# Patient Record
Sex: MALE | Race: WHITE | NOT HISPANIC OR LATINO | Employment: FULL TIME | URBAN - NONMETROPOLITAN AREA
[De-identification: names, ages, dates, MRNs, and addresses within clinical notes are randomized per-mention and may not be internally consistent; named-entity substitution may affect disease eponyms.]

---
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
## 2018-05-28 ENCOUNTER — HOSPITAL ENCOUNTER (EMERGENCY)
Facility: HOSPITAL | Age: 18
Discharge: HOME | End: 2018-05-28
Attending: FAMILY MEDICINE | Admitting: FAMILY MEDICINE
Payer: MEDICAID

## 2018-05-28 VITALS
RESPIRATION RATE: 16 BRPM | OXYGEN SATURATION: 96 % | DIASTOLIC BLOOD PRESSURE: 81 MMHG | TEMPERATURE: 98.4 F | SYSTOLIC BLOOD PRESSURE: 122 MMHG

## 2018-05-28 DIAGNOSIS — R10.9 FLANK PAIN: ICD-10-CM

## 2018-05-28 LAB
ALBUMIN UR-MCNC: NEGATIVE MG/DL
ANION GAP SERPL CALCULATED.3IONS-SCNC: 7 MMOL/L (ref 3–14)
APPEARANCE UR: CLEAR
BACTERIA #/AREA URNS HPF: ABNORMAL /HPF
BASOPHILS # BLD AUTO: 0.1 10E9/L (ref 0–0.2)
BASOPHILS NFR BLD AUTO: 0.8 %
BILIRUB UR QL STRIP: NEGATIVE
BUN SERPL-MCNC: 11 MG/DL (ref 7–30)
C TRACH DNA SPEC QL PROBE+SIG AMP: NOT DETECTED
CALCIUM SERPL-MCNC: 9.3 MG/DL (ref 8.5–10.1)
CHLORIDE SERPL-SCNC: 106 MMOL/L (ref 98–110)
CO2 SERPL-SCNC: 26 MMOL/L (ref 20–32)
COLOR UR AUTO: YELLOW
CREAT SERPL-MCNC: 1.04 MG/DL (ref 0.5–1)
CRP SERPL-MCNC: <2.9 MG/L (ref 0–8)
DIFFERENTIAL METHOD BLD: ABNORMAL
EOSINOPHIL # BLD AUTO: 0.2 10E9/L (ref 0–0.7)
EOSINOPHIL NFR BLD AUTO: 1.4 %
ERYTHROCYTE [DISTWIDTH] IN BLOOD BY AUTOMATED COUNT: 11.9 % (ref 10–15)
GFR SERPL CREATININE-BSD FRML MDRD: >90 ML/MIN/1.7M2
GLUCOSE SERPL-MCNC: 71 MG/DL (ref 70–99)
GLUCOSE UR STRIP-MCNC: NEGATIVE MG/DL
HCT VFR BLD AUTO: 43.1 % (ref 40–53)
HGB BLD-MCNC: 15.3 G/DL (ref 13.3–17.7)
HGB UR QL STRIP: NEGATIVE
IMM GRANULOCYTES # BLD: 0.1 10E9/L (ref 0–0.4)
IMM GRANULOCYTES NFR BLD: 0.4 %
KETONES UR STRIP-MCNC: NEGATIVE MG/DL
LEUKOCYTE ESTERASE UR QL STRIP: NEGATIVE
LYMPHOCYTES # BLD AUTO: 2.7 10E9/L (ref 0.8–5.3)
LYMPHOCYTES NFR BLD AUTO: 23.1 %
MCH RBC QN AUTO: 30.4 PG (ref 26.5–33)
MCHC RBC AUTO-ENTMCNC: 35.5 G/DL (ref 31.5–36.5)
MCV RBC AUTO: 86 FL (ref 78–100)
MONOCYTES # BLD AUTO: 0.7 10E9/L (ref 0–1.3)
MONOCYTES NFR BLD AUTO: 6.2 %
MUCOUS THREADS #/AREA URNS LPF: PRESENT /LPF
N GONORRHOEA DNA SPEC QL PROBE+SIG AMP: NOT DETECTED
NEUTROPHILS # BLD AUTO: 8.1 10E9/L (ref 1.6–8.3)
NEUTROPHILS NFR BLD AUTO: 68.1 %
NITRATE UR QL: NEGATIVE
NRBC # BLD AUTO: 0 10*3/UL
NRBC BLD AUTO-RTO: 0 /100
PH UR STRIP: 6 PH (ref 4.7–8)
PLATELET # BLD AUTO: 271 10E9/L (ref 150–450)
POTASSIUM SERPL-SCNC: 3.8 MMOL/L (ref 3.4–5.3)
RBC # BLD AUTO: 5.04 10E12/L (ref 4.4–5.9)
RBC #/AREA URNS AUTO: 1 /HPF (ref 0–2)
SODIUM SERPL-SCNC: 139 MMOL/L (ref 133–144)
SOURCE: ABNORMAL
SP GR UR STRIP: 1.02 (ref 1–1.03)
SPECIMEN SOURCE: NORMAL
UROBILINOGEN UR STRIP-MCNC: 2 MG/DL (ref 0–2)
WBC # BLD AUTO: 11.8 10E9/L (ref 4–11)
WBC #/AREA URNS AUTO: 2 /HPF (ref 0–5)

## 2018-05-28 PROCEDURE — 25000132 ZZH RX MED GY IP 250 OP 250 PS 637: Performed by: FAMILY MEDICINE

## 2018-05-28 PROCEDURE — 80048 BASIC METABOLIC PNL TOTAL CA: CPT | Performed by: FAMILY MEDICINE

## 2018-05-28 PROCEDURE — 86140 C-REACTIVE PROTEIN: CPT | Performed by: FAMILY MEDICINE

## 2018-05-28 PROCEDURE — 99284 EMERGENCY DEPT VISIT MOD MDM: CPT | Mod: Z6 | Performed by: FAMILY MEDICINE

## 2018-05-28 PROCEDURE — 81001 URINALYSIS AUTO W/SCOPE: CPT | Performed by: FAMILY MEDICINE

## 2018-05-28 PROCEDURE — 36415 COLL VENOUS BLD VENIPUNCTURE: CPT | Performed by: FAMILY MEDICINE

## 2018-05-28 PROCEDURE — 87591 N.GONORRHOEAE DNA AMP PROB: CPT | Performed by: FAMILY MEDICINE

## 2018-05-28 PROCEDURE — 85025 COMPLETE CBC W/AUTO DIFF WBC: CPT | Performed by: FAMILY MEDICINE

## 2018-05-28 PROCEDURE — 99284 EMERGENCY DEPT VISIT MOD MDM: CPT

## 2018-05-28 PROCEDURE — 25000128 H RX IP 250 OP 636: Performed by: FAMILY MEDICINE

## 2018-05-28 PROCEDURE — 87491 CHLMYD TRACH DNA AMP PROBE: CPT | Performed by: FAMILY MEDICINE

## 2018-05-28 RX ORDER — ACETAMINOPHEN 325 MG/1
975 TABLET ORAL ONCE
Status: COMPLETED | OUTPATIENT
Start: 2018-05-28 | End: 2018-05-28

## 2018-05-28 RX ADMIN — SODIUM CHLORIDE 1000 ML: 9 INJECTION, SOLUTION INTRAVENOUS at 21:44

## 2018-05-28 RX ADMIN — ACETAMINOPHEN 975 MG: 325 TABLET ORAL at 23:31

## 2018-05-28 ASSESSMENT — ENCOUNTER SYMPTOMS
FLANK PAIN: 1
DIFFICULTY URINATING: 0
VOMITING: 0
HEMATURIA: 0
DIARRHEA: 0
FREQUENCY: 0
DYSURIA: 0
CARDIOVASCULAR NEGATIVE: 1
PSYCHIATRIC NEGATIVE: 1
NAUSEA: 0
CONSTIPATION: 0
CONSTITUTIONAL NEGATIVE: 1
ABDOMINAL PAIN: 1
BACK PAIN: 1

## 2018-05-28 NOTE — ED AVS SNAPSHOT
[facility]    [address]    [place] [address]    Phone:  [phone]                                       Nnamdi Weeks   MRN: [id number]    Department:  [facility]   Date of Visit:  5/28/2018           Patient Information     Date Of Birth          2000        Your diagnoses for this visit were:     Flank pain        You were seen by Amanda Araiza MD.      Follow-up Information     Call Trang Merida    Specialty:  Family Practice    Contact information:    [facility]  [address] [facility] [address]  [phone]          Discharge Instructions         * Abdominal Pain,Uncertain Cause [Male]  Based on your visit today, the exact cause of your abdominal (stomach) pain is not clear. Your exam and tests do not indicate a dangerous cause at this time. However, the signs of a serious problem may take more time to appear. Although your exam was reassuring today, sometimes early in the course of many conditions, exam and lab tests can appear normal. Therefore, it is important for you to watch for any new symptoms or worsening of your condition.  Causes:  It may not be obvious what caused your symptoms. Pay attention to things that do seem to make your symptoms worse or better and discuss this with your doctor when you follow up.  Diagnosis:  The evaluation of abdominal pain in the emergency department may only require an exam by the doctor or it may include blood, urine or imaging studies, depending on many factors. Sometimes exams and tests can identify a cause but in many cases, a clear cause is not found. Further testing at follow up visits may help to suggest a clear diagnosis.  Home Care:    Rest as much as possible until your next exam.    Try to avoid any medications (unless otherwise directed by your doctor), foods, activities, or other factors that you may have contributed to your symptoms.    Try to eat foods that you know  that you have tolerated well in the past. Certain diets may be recommended for some conditions that cause abdominal pain. However, since the cause of your symptoms may not be clear, discuss your diet more with your primary care provider or specialist for further recommendations.     Eating several small meals per day as opposed to 2 or 3 larger meals may help.    Monitor closely for anything that may make your symptoms worse or better. Pay close attention to symptoms below that may indicate worsening of your condition.  Follow Up And Precautions:  See your doctor or this facility as instructed (or sooner, if your symptoms are not improving). In some cases, you may need more testing.  Contact Your Doctor Or Seek Medical Attention  if any of the following occur:    Pain is becoming worse    You are unable to take your medications because of too much vomiting    Swelling of the abdomen    Fever of 101 F (38.3 C) or higher, or as directed by your health care provider    Blood in vomit or bowel movements (dark red or black color)    Jaundice (yellow color of eyes and skin)    New onset of weakness, dizziness or fainting    New onset of chest, arm, back, neck or jaw pain    2091-4846 [employer]. [address] [place] [address]. All rights reserved. This information is not intended as a substitute for professional medical care. Always follow your healthcare professional's instructions.  This information has been modified by your health care provider with permission from the publisher.          Flank Pain, Uncertain Cause  The flank is the area between your upper abdomen and your back. Pain there is often caused by a problem with your kidneys. It might be a kidney infection or a kidney stone. Other causes of flank pain include spinal arthritis, a pinched nerve from a back injury, or a back muscle strain or spasm.  The cause of your flank pain is not certain. You may need other tests.  Home  care  Follow these tips when caring for yourself at home:    You may use acetaminophen or ibuprofen to control pain, unless your health care provider prescribed another medicine. If you have chronic liver or kidney disease, talk with your provider before taking these medicines. Also talk with your provider first if you ve ever had a stomach ulcer or GI bleeding.    If the pain is coming from your muscles, you may get relief with ice or heat. During the first 2 days after the injury, put an ice pack on the painful area for 20 minutes every 2 to 4 hours. This will reduce swelling and pain. A hot shower, hot bath, or heating pad works well for a muscle spasm. You can start with ice, then switch to heat after 2 days. You might find that alternating ice and heat works well. Use the method that feels the best to you.  Follow-up care  Follow up with your healthcare provider if your symptoms don t get better over the next few days.  When to seek medical advice  Call your healthcare provider right away if any of these happen:    Repeated vomiting    Fever of 100.4 F (38 C) or higher, or as directed by your health care provider    Flank pain that gets worse    Pain that spreads to the front of your belly (abdomen)    Dizziness, weakness, or fainting    Blood in your urine    Burning feeling when you urinate or the need to urinate often    Pain in one of your legs that gets worse    Numbness or weakness in a leg  Date Last Reviewed: 10/1/2016    0314-4149 [employer]. [address] [place] [address], [place] [address]. All rights reserved. This information is not intended as a substitute for professional medical care. Always follow your healthcare professional's instructions.    I suspect your pain is musculoskeletal . Recommend ice, ibuprofen , tylenol . Recommend schedule a follow up appointment with your primary care provider for further recommendations          Review of your medicines      Notice     You have not  "been prescribed any medications.            Procedures and tests performed during your visit     Basic metabolic panel    CBC with platelets differential    CRP inflammation    GC/Chlamydia by PCR - [facility]    Peripheral IV catheter    UA with Microscopic reflex to Culture      Orders Needing Specimen Collection     None      Pending Results     Date and Time Order Name Status Description    2018 2131 GC/Chlamydia by PCR - [facility] In process             Pending Culture Results     Date and Time Order Name Status Description    2018 2131 GC/Chlamydia by PCR - [facility] In process             Thank you for choosing [facility]       Thank you for choosing [facility] for your care. Our goal is always to provide you with excellent care. Hearing back from our patients is one way we can continue to improve our services. Please take a few minutes to complete the written survey that you may receive in the mail after you visit with us. Thank you!        [employer] Information     [facility] lets you send messages to your doctor, view your test results, renew your prescriptions, schedule appointments and more. To sign up, go to [email or web address] . Click on \"Log in\" on the left side of the screen, which will take you to the Welcome page. Then click on \"Sign up Now\" on the right side of the page.     You will be asked to enter the access code listed below, as well as some personal information. Please follow the directions to create your username and password.     Your access code is: [email or web address]  Expires: 2018 11:23 PM     Your access code will  in 90 days. If you need help or a new code, please call your [facility] clinic or [phone].        Care EveryWhere ID     This is your Care EveryWhere ID. This could be used by other organizations to access your [facility] medical records  [id number]        Equal Access to Services     ROSS BUNCH AH: marti Rae qaybta kaalmada adeegyada, " virgilio bustamante'aan ah. So [facility] [phone].    ATENCIÓN: Si habla español, tiene a priest disposición servicios gratuitos de asistencia lingüística. Llame al [phone].    We comply with applicable federal civil rights laws and [place] laws. We do not discriminate on the basis of race, color, national origin, age, disability, sex, sexual orientation, or gender identity.            After Visit Summary       This is your record. Keep this with you and show to your community pharmacist(s) and doctor(s) at your next visit.

## 2018-05-28 NOTE — ED AVS SNAPSHOT
[facility]    [address] [address]    Phone:  [phone]                                       Nnamdi Weeks   MRN: [id number]    Department:  [facility]   Date of Visit:  5/28/2018           After Visit Summary Signature Page     I have received my discharge instructions, and my questions have been answered. I have discussed any challenges I see with this plan with the nurse or doctor.    ..........................................................................................................................................  Patient/Patient Representative Signature      ..........................................................................................................................................  Patient Representative Print Name and Relationship to Patient    ..................................................               ................................................  Date                                            Time    ..........................................................................................................................................  Reviewed by Signature/Title    ...................................................              ..............................................  Date                                                            Time

## 2018-05-29 NOTE — ED NOTES
Patient verbalizes understanding of discharge instructions. Afebrile. Rates pain 5/10. PO tylenol given. Denies nausea.

## 2018-05-29 NOTE — DISCHARGE INSTRUCTIONS
* Abdominal Pain,Uncertain Cause [Male]  Based on your visit today, the exact cause of your abdominal (stomach) pain is not clear. Your exam and tests do not indicate a dangerous cause at this time. However, the signs of a serious problem may take more time to appear. Although your exam was reassuring today, sometimes early in the course of many conditions, exam and lab tests can appear normal. Therefore, it is important for you to watch for any new symptoms or worsening of your condition.  Causes:  It may not be obvious what caused your symptoms. Pay attention to things that do seem to make your symptoms worse or better and discuss this with your doctor when you follow up.  Diagnosis:  The evaluation of abdominal pain in the emergency department may only require an exam by the doctor or it may include blood, urine or imaging studies, depending on many factors. Sometimes exams and tests can identify a cause but in many cases, a clear cause is not found. Further testing at follow up visits may help to suggest a clear diagnosis.  Home Care:    Rest as much as possible until your next exam.    Try to avoid any medications (unless otherwise directed by your doctor), foods, activities, or other factors that you may have contributed to your symptoms.    Try to eat foods that you know that you have tolerated well in the past. Certain diets may be recommended for some conditions that cause abdominal pain. However, since the cause of your symptoms may not be clear, discuss your diet more with your primary care provider or specialist for further recommendations.     Eating several small meals per day as opposed to 2 or 3 larger meals may help.    Monitor closely for anything that may make your symptoms worse or better. Pay close attention to symptoms below that may indicate worsening of your condition.  Follow Up And Precautions:  See your doctor or this facility as instructed (or sooner, if your symptoms are not  improving). In some cases, you may need more testing.  Contact Your Doctor Or Seek Medical Attention  if any of the following occur:    Pain is becoming worse    You are unable to take your medications because of too much vomiting    Swelling of the abdomen    Fever of 101 F (38.3 C) or higher, or as directed by your health care provider    Blood in vomit or bowel movements (dark red or black color)    Jaundice (yellow color of eyes and skin)    New onset of weakness, dizziness or fainting    New onset of chest, arm, back, neck or jaw pain    7974-7966 [employer]. [address] [place] [address]. All rights reserved. This information is not intended as a substitute for professional medical care. Always follow your healthcare professional's instructions.  This information has been modified by your health care provider with permission from the publisher.          Flank Pain, Uncertain Cause  The flank is the area between your upper abdomen and your back. Pain there is often caused by a problem with your kidneys. It might be a kidney infection or a kidney stone. Other causes of flank pain include spinal arthritis, a pinched nerve from a back injury, or a back muscle strain or spasm.  The cause of your flank pain is not certain. You may need other tests.  Home care  Follow these tips when caring for yourself at home:    You may use acetaminophen or ibuprofen to control pain, unless your health care provider prescribed another medicine. If you have chronic liver or kidney disease, talk with your provider before taking these medicines. Also talk with your provider first if you ve ever had a stomach ulcer or GI bleeding.    If the pain is coming from your muscles, you may get relief with ice or heat. During the first 2 days after the injury, put an ice pack on the painful area for 20 minutes every 2 to 4 hours. This will reduce swelling and pain. A hot shower, hot bath, or heating pad works well  for a muscle spasm. You can start with ice, then switch to heat after 2 days. You might find that alternating ice and heat works well. Use the method that feels the best to you.  Follow-up care  Follow up with your healthcare provider if your symptoms don t get better over the next few days.  When to seek medical advice  Call your healthcare provider right away if any of these happen:    Repeated vomiting    Fever of 100.4 F (38 C) or higher, or as directed by your health care provider    Flank pain that gets worse    Pain that spreads to the front of your belly (abdomen)    Dizziness, weakness, or fainting    Blood in your urine    Burning feeling when you urinate or the need to urinate often    Pain in one of your legs that gets worse    Numbness or weakness in a leg  Date Last Reviewed: 10/1/2016    2490-2261 [employer]. [address] [place] [address]. All rights reserved. This information is not intended as a substitute for professional medical care. Always follow your healthcare professional's instructions.    I suspect your pain is musculoskeletal . Recommend ice, ibuprofen , tylenol . Recommend schedule a follow up appointment with your primary care provider for further recommendations

## 2018-05-29 NOTE — ED NOTES
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 His PCP is in [place], states \"This is a much better ER than [place]'s\" Providing a urine sample. Very lighted hearted and laughing, in no acute distress.  "

## 2018-05-29 NOTE — ED PROVIDER NOTES
History     Chief Complaint   Patient presents with     Flank Pain     rt flank pain x 2 days     HPI  Nnamdi Weeks is a 17 year old male who presents for right flank pain . STates pain in right lower back . States that pain started about 2 days ago . Has had similar episodes previously.  Stabbing , sharp pain Increased pain with breathing . NO urinary changes . NO difficulty urinating . NO blood in urine . NO recent heavy lifting or change in activity . Slight cough but states has constant congestion from allergies. NO fever . NO chills . NO systemic symptoms No history of constipation      Problem List:    Patient Active Problem List    Diagnosis Date Noted     Bipolar I disorder (H) 04/07/2016     Priority: Medium     Attention deficit hyperactivity disorder (ADHD), predominantly inattentive type 04/07/2016     Priority: Medium        Past Medical History:    Past Medical History:   Diagnosis Date     Abdominal pain, unspecified site 11/14/2008     ADHD (attention deficit hyperactivity disorder) 12/13/2012     Amphetamine user      Anxiety disorder 03/19/2015     Asthma,unspecified type, unspecified 05/25/2005     Bronchitis, not specified as acute or chronic 03/24/2005     Chemical dependency (H) 10/20/2014     Conduct disorder 10/20/2014     Deviated nasal septum      Diarrhea 09/27/2005     Disruptive behavior disorder 03/19/2015     Hearing deficit, bilateral 09/04/2014     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/13/2012     IBS (irritable bowel syndrome)      Intermittent explosive disorder      Lactose intolerance 12/13/2012     OCD (obsessive compulsive disorder) 03/09/2015     ODD (oppositional defiant disorder) 03/09/2015     Orthopedic aftercare NEC 10/08/2004     Polyphagia 12/07/2007     PTSD (post-traumatic stress disorder) 03/19/2015     Routine infant or child health check 09/12/2007     Sensorineural hearing loss 11/03/2004       Past Surgical History:    Past Surgical History:   Procedure Laterality Date      ADENOIDECTOMY       TONSILLECTOMY         Family History:    Family History   Problem Relation Age of Onset     Allergies Sister      Allergies Mother      Asthma Sister      HEART DISEASE Maternal Grandmother      disease     Schizophrenia Brother        Social History:  Marital Status:  Single [1]  Social History   Substance Use Topics     Smoking status: Current Every Day Smoker     Packs/day: 1.00     Smokeless tobacco: Former User      Comment: no passive exposure     Alcohol use No        Medications:      No current outpatient prescriptions on file.      Review of Systems   Constitutional: Negative.    HENT: Negative.    Cardiovascular: Negative.    Gastrointestinal: Positive for abdominal pain. Negative for constipation, diarrhea, nausea and vomiting.   Genitourinary: Positive for flank pain. Negative for decreased urine volume, difficulty urinating, dysuria, enuresis, frequency, hematuria and urgency.   Musculoskeletal: Positive for back pain.   Skin: Negative.    Psychiatric/Behavioral: Negative.    All other systems reviewed and are negative.      Physical Exam   BP: 126/87  Heart Rate: 85  Temp: 96.9  F (36.1  C)  Resp: 14  SpO2: 96 %      Physical Exam   Constitutional: He is oriented to person, place, and time. He appears well-developed and well-nourished. No distress.   HENT:   Head: Normocephalic and atraumatic.   Eyes: Pupils are equal, round, and reactive to light.   Neck: Normal range of motion. Neck supple. No JVD present. No tracheal deviation present. No thyromegaly present.   Cardiovascular: Normal rate, regular rhythm, normal heart sounds and intact distal pulses.  Exam reveals no gallop and no friction rub.    No murmur heard.  Pulmonary/Chest: Effort normal and breath sounds normal. No stridor. No respiratory distress. He has no wheezes. He has no rales. He exhibits no tenderness.   Abdominal: Soft. Bowel sounds are normal. He exhibits no distension and no mass. There is no tenderness.  There is no rebound and no guarding.   Musculoskeletal: Normal range of motion.   Lymphadenopathy:     He has no cervical adenopathy.   Neurological: He is alert and oriented to person, place, and time.   Skin: Skin is warm. He is not diaphoretic.   Psychiatric: He has a normal mood and affect.       ED Course     ED Course     Procedures    Patient arrived to ER with complaint of right flank pain . Patient triaged to exam room. Medical records reviewed including most recent ER visit for flank  Pain including humble lCT scan . Exam not suggestive of acute abdomen. Labs and diagnostics ordered showing no abnormalites. Discussed normal findings with patient  Suspect pain is musculoskeletal in origin .Discussed with patient  Recommend that he schedule a follow up appointment with his primary care to discuss further work up and management      Results for orders placed or performed during the hospital encounter of 05/28/18   UA with Microscopic reflex to Culture   Result Value Ref Range    Color Urine Yellow     Appearance Urine Clear     Glucose Urine Negative NEG^Negative mg/dL    Bilirubin Urine Negative NEG^Negative    Ketones Urine Negative NEG^Negative mg/dL    Specific Gravity Urine 1.020 1.003 - 1.035    Blood Urine Negative NEG^Negative    pH Urine 6.0 4.7 - 8.0 pH    Protein Albumin Urine Negative NEG^Negative mg/dL    Urobilinogen mg/dL 2.0 0.0 - 2.0 mg/dL    Nitrite Urine Negative NEG^Negative    Leukocyte Esterase Urine Negative NEG^Negative    Source Midstream Urine     WBC Urine 2 0 - 5 /HPF    RBC Urine 1 0 - 2 /HPF    Bacteria Urine None (A) NEG^Negative /HPF    Mucous Urine Present (A) NEG^Negative /LPF   Basic metabolic panel   Result Value Ref Range    Sodium 139 133 - 144 mmol/L    Potassium 3.8 3.4 - 5.3 mmol/L    Chloride 106 98 - 110 mmol/L    Carbon Dioxide 26 20 - 32 mmol/L    Anion Gap 7 3 - 14 mmol/L    Glucose 71 70 - 99 mg/dL    Urea Nitrogen 11 7 - 30 mg/dL    Creatinine 1.04 (H) 0.50 -  1.00 mg/dL    GFR Estimate >90 >60 mL/min/1.7m2    GFR Estimate If Black >90 >60 mL/min/1.7m2    Calcium 9.3 8.5 - 10.1 mg/dL   CBC with platelets differential   Result Value Ref Range    WBC 11.8 (H) 4.0 - 11.0 10e9/L    RBC Count 5.04 4.4 - 5.9 10e12/L    Hemoglobin 15.3 13.3 - 17.7 g/dL    Hematocrit 43.1 40.0 - 53.0 %    MCV 86 78 - 100 fl    MCH 30.4 26.5 - 33.0 pg    MCHC 35.5 31.5 - 36.5 g/dL    RDW 11.9 10.0 - 15.0 %    Platelet Count 271 150 - 450 10e9/L    Diff Method Automated Method     % Neutrophils 68.1 %    % Lymphocytes 23.1 %    % Monocytes 6.2 %    % Eosinophils 1.4 %    % Basophils 0.8 %    % Immature Granulocytes 0.4 %    Nucleated RBCs 0 0 /100    Absolute Neutrophil 8.1 1.6 - 8.3 10e9/L    Absolute Lymphocytes 2.7 0.8 - 5.3 10e9/L    Absolute Monocytes 0.7 0.0 - 1.3 10e9/L    Absolute Eosinophils 0.2 0.0 - 0.7 10e9/L    Absolute Basophils 0.1 0.0 - 0.2 10e9/L    Abs Immature Granulocytes 0.1 0 - 0.4 10e9/L    Absolute Nucleated RBC 0.0    CRP inflammation   Result Value Ref Range    CRP Inflammation <2.9 0.0 - 8.0 mg/L   GC/Chlamydia by PCR - [facility],   Result Value Ref Range    Specimen Source Urine     Neisseria gonorrhoreae PCR Not Detected NDET^Not Detected    Chlamydia Trachomatis PCR Not Detected NDET^Not Detected       No results found for this or any previous visit (from the past 24 hour(s)).    Medications   0.9% sodium chloride BOLUS (1,000 mLs Intravenous New Bag 5/28/18 2263)       Assessments & Plan (with Medical Decision Making)     I have reviewed the nursing notes.    I have reviewed the findings, diagnosis, plan and need for follow up with the patient.      New Prescriptions    No medications on file       Final diagnoses:   None     (R10.9) Flank pain  *      5/28/2018   [facility]     Amanda Araiza MD  05/30/18 0955

## 2018-07-09 ENCOUNTER — HOSPITAL ENCOUNTER (EMERGENCY)
Facility: HOSPITAL | Age: 18
Discharge: HOME | End: 2018-07-09
Attending: EMERGENCY MEDICINE | Admitting: EMERGENCY MEDICINE
Payer: MEDICAID

## 2018-07-09 VITALS
OXYGEN SATURATION: 96 % | HEIGHT: 74 IN | SYSTOLIC BLOOD PRESSURE: 115 MMHG | RESPIRATION RATE: 18 BRPM | TEMPERATURE: 99.1 F | DIASTOLIC BLOOD PRESSURE: 73 MMHG

## 2018-07-09 DIAGNOSIS — M94.0 COSTOCHONDRITIS: Primary | ICD-10-CM

## 2018-07-09 PROCEDURE — 99283 EMERGENCY DEPT VISIT LOW MDM: CPT

## 2018-07-09 PROCEDURE — 99284 EMERGENCY DEPT VISIT MOD MDM: CPT | Mod: Z6 | Performed by: EMERGENCY MEDICINE

## 2018-07-09 PROCEDURE — 93005 ELECTROCARDIOGRAM TRACING: CPT

## 2018-07-09 RX ORDER — ALBUTEROL SULFATE 90 UG/1
2 AEROSOL, METERED RESPIRATORY (INHALATION)
Status: ON HOLD | COMMUNITY
Start: 2018-04-17 | End: 2023-01-18

## 2018-07-09 RX ORDER — IBUPROFEN 800 MG/1
800 TABLET, FILM COATED ORAL
COMMUNITY
Start: 2018-06-30 | End: 2018-07-09

## 2018-07-09 RX ORDER — IBUPROFEN 800 MG/1
800 TABLET, FILM COATED ORAL EVERY 8 HOURS PRN
Qty: 60 TABLET | Refills: 0 | Status: SHIPPED | OUTPATIENT
Start: 2018-07-09 | End: 2018-07-17

## 2018-07-09 RX ORDER — FLUTICASONE PROPIONATE 50 MCG
2 SPRAY, SUSPENSION (ML) NASAL
Status: ON HOLD | COMMUNITY
Start: 2018-04-17 | End: 2023-01-18

## 2018-07-09 NOTE — ED TRIAGE NOTES
Pt comes to the ER reporting chest pain for 2 weeks. Seen in the clinic and and was told it was a pulled muscle, told to take ibuprofen- but it hasn't been helping. No nausea, no shortness of breath.

## 2018-07-09 NOTE — ED AVS SNAPSHOT
[facility]    [address] [address]    Phone:  [phone]                                       Nnamdi Weeks   MRN: [id number]    Department:  [facility]   Date of Visit:  7/9/2018           After Visit Summary Signature Page     I have received my discharge instructions, and my questions have been answered. I have discussed any challenges I see with this plan with the nurse or doctor.    ..........................................................................................................................................  Patient/Patient Representative Signature      ..........................................................................................................................................  Patient Representative Print Name and Relationship to Patient    ..................................................               ................................................  Date                                            Time    ..........................................................................................................................................  Reviewed by Signature/Title    ...................................................              ..............................................  Date                                                            Time

## 2018-07-09 NOTE — DISCHARGE INSTRUCTIONS
Chest Wall Pain: Costochondritis    The chest pain that you have had today is caused by costochondritis. This condition is caused by an inflammation of the cartilage joining your ribs to your breastbone. It is not caused by heart or lung problems. Your healthcare team has made sure that the chest pain you feel is not from a life threatening cause of chest pain such as heart attack, collapsed lung, blood clot in the lung, tear in the aorta, or esophageal rupture. The inflammation may have been brought on by a blow to the chest, lifting heavy objects, intense exercise, or an illness that made you cough and sneeze a lot. It often occurs during times of emotional stress. It can be painful, but it is not dangerous. It usually goes away in 1 to 2 weeks. But it may happen again. Rarely, a more serious condition may cause symptoms similar to costochondritis. That s why it s important to watch for the warning signs listed below.  Home care  Follow these guidelines when caring for yourself at home:    If you feel that emotional stress is a cause of your condition, try to figure out the sources of that stress. It may not be obvious. Learn ways to deal with the stress in your life. This can include regular exercise, muscle relaxation, meditation, or simply taking time out for yourself.    You may use acetaminophen, ibuprofen, or naproxen to control pain, unless another pain medicine was prescribed. If you have liver or kidney disease or ever had a stomach ulcer, talk with your healthcare provider before using these medicines.    You can also help ease pain by using a hot, wet compress or heating pad. Use this with or without a medicated skin cream that helps relieves pain.    Do stretching exercise as advised by your provider.    Take any prescribed medicines as directed.  Follow-up care  Follow up with your healthcare provider, or as advised, if you do not start to get better in the next 2 days.  When to seek medical  advice  Call your healthcare provider right away if any of these occur:    A change in the type of pain. Call if it feels different, becomes more serious, lasts longer, or spreads into your shoulder, arm, neck, jaw, or back.    Shortness of breath or pain gets worse when you breathe    Weakness, dizziness, or fainting    Cough with dark-colored sputum (phlegm) or blood    Abdominal pain    Dark red or black stools    Fever of 100.4 F (38 C) or higher, or as directed by your healthcare provider  Date Last Reviewed: 12/1/2016 2000-2017 [employer]. [address] [place] [address]. All rights reserved. This information is not intended as a substitute for professional medical care. Always follow your healthcare professional's instructions.

## 2018-07-09 NOTE — ED AVS SNAPSHOT
[facility]    [address] [place] [address]    [place] [address]    Phone:  [phone]                                       Nnamdi Weeks   MRN: [id number]    Department:  [facility]   Date of Visit:  7/9/2018           Patient Information     Date Of Birth          2000        Your diagnoses for this visit were:     Costochondritis        You were seen by Eulogio Schmidt MD.      Follow-up Information     Follow up with Trang Merida    Specialty:  Family Practice    Why:  If symptoms worsen, As needed    Contact information:    [facility]  [address] [facility] [address]  [phone]          Discharge Instructions         Chest Wall Pain: Costochondritis    The chest pain that you have had today is caused by costochondritis. This condition is caused by an inflammation of the cartilage joining your ribs to your breastbone. It is not caused by heart or lung problems. Your healthcare team has made sure that the chest pain you feel is not from a life threatening cause of chest pain such as heart attack, collapsed lung, blood clot in the lung, tear in the aorta, or esophageal rupture. The inflammation may have been brought on by a blow to the chest, lifting heavy objects, intense exercise, or an illness that made you cough and sneeze a lot. It often occurs during times of emotional stress. It can be painful, but it is not dangerous. It usually goes away in 1 to 2 weeks. But it may happen again. Rarely, a more serious condition may cause symptoms similar to costochondritis. That s why it s important to watch for the warning signs listed below.  Home care  Follow these guidelines when caring for yourself at home:    If you feel that emotional stress is a cause of your condition, try to figure out the sources of that stress. It may not be obvious. Learn ways to deal with the stress in your life. This can include regular exercise, muscle relaxation, meditation, or  simply taking time out for yourself.    You may use acetaminophen, ibuprofen, or naproxen to control pain, unless another pain medicine was prescribed. If you have liver or kidney disease or ever had a stomach ulcer, talk with your healthcare provider before using these medicines.    You can also help ease pain by using a hot, wet compress or heating pad. Use this with or without a medicated skin cream that helps relieves pain.    Do stretching exercise as advised by your provider.    Take any prescribed medicines as directed.  Follow-up care  Follow up with your healthcare provider, or as advised, if you do not start to get better in the next 2 days.  When to seek medical advice  Call your healthcare provider right away if any of these occur:    A change in the type of pain. Call if it feels different, becomes more serious, lasts longer, or spreads into your shoulder, arm, neck, jaw, or back.    Shortness of breath or pain gets worse when you breathe    Weakness, dizziness, or fainting    Cough with dark-colored sputum (phlegm) or blood    Abdominal pain    Dark red or black stools    Fever of 100.4 F (38 C) or higher, or as directed by your healthcare provider  Date Last Reviewed: 12/1/2016 2000-2017 [employer]. [address] [place] [address]. All rights reserved. This information is not intended as a substitute for professional medical care. Always follow your healthcare professional's instructions.             Review of your medicines      Our records show that you are taking the medicines listed below. If these are incorrect, please call your family doctor or clinic.        Dose / Directions Last dose taken    albuterol 108 (90 Base) MCG/ACT Inhaler   Commonly known as:  PROAIR HFA/PROVENTIL HFA/VENTOLIN HFA   Dose:  2 puff        Inhale 2 puffs into the lungs   Refills:  0        fluticasone 50 MCG/ACT spray   Commonly known as:  FLONASE   Dose:  2 spray        Spray 2 sprays in  "nostril   Refills:  0        ibuprofen 800 MG tablet   Commonly known as:  ADVIL/MOTRIN   Dose:  800 mg        Take 800 mg by mouth   Refills:  0                Orders Needing Specimen Collection     None      Pending Results     No orders found from 2018 to 7/10/2018.            Pending Culture Results     No orders found from 2018 to 7/10/2018.            Thank you for choosing [facility]       Thank you for choosing [facility] for your care. Our goal is always to provide you with excellent care. Hearing back from our patients is one way we can continue to improve our services. Please take a few minutes to complete the written survey that you may receive in the mail after you visit with us. Thank you!        [employer] Information     [employer] lets you send messages to your doctor, view your test results, renew your prescriptions, schedule appointments and more. To sign up, go to [email or web address] . Click on \"Log in\" on the left side of the screen, which will take you to the Welcome page. Then click on \"Sign up Now\" on the right side of the page.     You will be asked to enter the access code listed below, as well as some personal information. Please follow the directions to create your username and password.     Your access code is: [email or web address]  Expires: 2018 11:23 PM     Your access code will  in 90 days. If you need help or a new code, please call your [facility] clinic or [phone].        Care EveryWhere ID     This is your Care EveryWhere ID. This could be used by other organizations to access your [facility] medical records  [id number]        Equal Access to Services     ROSS BUNCH : Hadii alfredo Tristan, waaxda miguelina, qaybta kaalmavirgilio tucker. [facility] [phone].    ATENCIÓN: Si habla español, tiene a priest disposición servicios gratuitos de asistencia lingüística. Llame al [phone].    We comply with applicable federal civil " rights laws and [place] laws. We do not discriminate on the basis of race, color, national origin, age, disability, sex, sexual orientation, or gender identity.            After Visit Summary       This is your record. Keep this with you and show to your community pharmacist(s) and doctor(s) at your next visit.

## 2018-07-09 NOTE — ED PROVIDER NOTES
History     Chief Complaint   Patient presents with     Chest Pain     worse with inspiration, coughing, moving, and touch. Left sided chest pain     HPI  Nnamdi Weeks is a 17 year old male who presents to the emergency department with a two-week history of left anterior chest wall pain.  Patient was seen in [place] emergency department and started on Motrin.  Pain is still persistent.  Patient only takes Motrin in the morning when he wakes up and does not take it again throughout the day.  Patient denies any shortness of breath, fever, wheezing, orthopnea or paroxysmal nocturnal dyspnea.  He denies any history of trauma or straining.  The pain is worse with deep inspiration and chest wall movement.  EKG done today in the emergency department shows normal sinus rhythm without any acute changes.    Problem List:    Patient Active Problem List    Diagnosis Date Noted     Bipolar I disorder (H) 04/07/2016     Priority: Medium     Attention deficit hyperactivity disorder (ADHD), predominantly inattentive type 04/07/2016     Priority: Medium        Past Medical History:    Past Medical History:   Diagnosis Date     Abdominal pain, unspecified site 11/14/2008     ADHD (attention deficit hyperactivity disorder) 12/13/2012     Amphetamine user      Anxiety disorder 03/19/2015     Asthma,unspecified type, unspecified 05/25/2005     Bronchitis, not specified as acute or chronic 03/24/2005     Chemical dependency (H) 10/20/2014     Conduct disorder 10/20/2014     Deviated nasal septum      Diarrhea 09/27/2005     Disruptive behavior disorder 03/19/2015     Hearing deficit, bilateral 09/04/2014     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/13/2012     IBS (irritable bowel syndrome)      Intermittent explosive disorder      Lactose intolerance 12/13/2012     OCD (obsessive compulsive disorder) 03/09/2015     ODD (oppositional defiant disorder) 03/09/2015     Orthopedic aftercare NEC 10/08/2004     Polyphagia 12/07/2007     PTSD (post-traumatic  "stress disorder) 03/19/2015     Routine infant or child health check 09/12/2007     Sensorineural hearing loss 11/03/2004       Past Surgical History:    Past Surgical History:   Procedure Laterality Date     ADENOIDECTOMY       TONSILLECTOMY         Family History:    Family History   Problem Relation Age of Onset     Allergies Sister      Allergies Mother      Asthma Sister      HEART DISEASE Maternal Grandmother      disease     Schizophrenia Brother        Social History:  Marital Status:  Single [1]  Social History   Substance Use Topics     Smoking status: Current Every Day Smoker     Packs/day: 1.00     Smokeless tobacco: Former User      Comment: no passive exposure     Alcohol use No        Medications:      albuterol (PROAIR HFA/PROVENTIL HFA/VENTOLIN HFA) 108 (90 Base) MCG/ACT Inhaler   ibuprofen (ADVIL/MOTRIN) 800 MG tablet   fluticasone (FLONASE) 50 MCG/ACT spray         Review of Systems   All other systems reviewed and are negative.      Physical Exam   BP: 115/73  Heart Rate: 89  Temp: 99.1  F (37.3  C)  Resp: 18  Height: 188 cm (6' 2\")  SpO2: 96 %      Physical Exam   Constitutional: He appears well-developed and well-nourished. No distress.   HENT:   Head: Normocephalic and atraumatic.   Cardiovascular: Normal rate, regular rhythm and normal heart sounds.    Pulmonary/Chest: Effort normal and breath sounds normal. No respiratory distress. He has no wheezes. He exhibits tenderness.   Left parasternal anterior chest wall tenderness   Abdominal: Soft. Bowel sounds are normal. He exhibits no distension. There is no tenderness.   Skin: He is not diaphoretic.   Nursing note and vitals reviewed.      ED Course     ED Course     Procedures         EKG Interpretation:      Interpreted by Eulogio Schmidt  Time reviewed: 02:35 AM  Symptoms at time of EKG: Left sided anterior chest wall pain  Rhythm: normal sinus   Rate: normal  Axis: normal  Ectopy: none  Conduction: normal  ST Segments/ T Waves: No ST-T " wave changes  Q Waves: none  Comparison to prior: No old EKG available    Clinical Impression: normal EKG               No results found for this or any previous visit (from the past 24 hour(s)).    Medications - No data to display    Assessments & Plan (with Medical Decision Making)   Left-sided costochondritis: Normal EKG with sinus rhythm and no ST or T-wave changes.  Encouraged patient to use Motrin consistently rather than just once a day.  Also encouraged to take deep breaths and follow-up with PCP if not better.  Subsequently discharged home.    I have reviewed the nursing notes.    I have reviewed the findings, diagnosis, plan and need for follow up with the patient.    Discharge Medication List as of 7/9/2018  2:43 AM          Final diagnoses:   Costochondritis       7/9/2018   [facility]     Eulogio Schmidt MD  07/09/18 0253

## 2019-04-08 ENCOUNTER — HOSPITAL ENCOUNTER (EMERGENCY)
Facility: HOSPITAL | Age: 19
Discharge: LEFT BEFORE DISCHARGE | End: 2019-04-08
Admitting: EMERGENCY MEDICINE
Payer: MEDICAID

## 2019-04-08 VITALS
SYSTOLIC BLOOD PRESSURE: 145 MMHG | OXYGEN SATURATION: 98 % | DIASTOLIC BLOOD PRESSURE: 98 MMHG | RESPIRATION RATE: 16 BRPM | TEMPERATURE: 98.2 F

## 2019-04-08 PROCEDURE — 40000268 ZZH STATISTIC NO CHARGES

## 2020-09-22 ENCOUNTER — HOSPITAL ENCOUNTER (EMERGENCY)
Facility: HOSPITAL | Age: 20
End: 2020-09-22
Payer: MEDICAID

## 2022-03-21 ENCOUNTER — APPOINTMENT (OUTPATIENT)
Dept: ULTRASOUND IMAGING | Facility: HOSPITAL | Age: 22
End: 2022-03-21
Attending: EMERGENCY MEDICINE
Payer: COMMERCIAL

## 2022-03-21 ENCOUNTER — HOSPITAL ENCOUNTER (EMERGENCY)
Facility: HOSPITAL | Age: 22
Discharge: HOME | End: 2022-03-22
Attending: EMERGENCY MEDICINE | Admitting: EMERGENCY MEDICINE
Payer: COMMERCIAL

## 2022-03-21 DIAGNOSIS — N20.0 KIDNEY STONE: ICD-10-CM

## 2022-03-21 LAB
ALBUMIN UR-MCNC: 50 MG/DL
APPEARANCE UR: ABNORMAL
BASOPHILS # BLD AUTO: 0.1 10E3/UL (ref 0–0.2)
BASOPHILS NFR BLD AUTO: 1 %
BILIRUB UR QL STRIP: NEGATIVE
COLOR UR AUTO: YELLOW
EOSINOPHIL # BLD AUTO: 0.1 10E3/UL (ref 0–0.7)
EOSINOPHIL NFR BLD AUTO: 1 %
ERYTHROCYTE [DISTWIDTH] IN BLOOD BY AUTOMATED COUNT: 12.2 % (ref 10–15)
GLUCOSE UR STRIP-MCNC: NEGATIVE MG/DL
HCT VFR BLD AUTO: 46.4 % (ref 40–53)
HGB BLD-MCNC: 16 G/DL (ref 13.3–17.7)
HGB UR QL STRIP: ABNORMAL
IMM GRANULOCYTES # BLD: 0.1 10E3/UL
IMM GRANULOCYTES NFR BLD: 0 %
KETONES UR STRIP-MCNC: NEGATIVE MG/DL
LEUKOCYTE ESTERASE UR QL STRIP: ABNORMAL
LYMPHOCYTES # BLD AUTO: 2.5 10E3/UL (ref 0.8–5.3)
LYMPHOCYTES NFR BLD AUTO: 22 %
MCH RBC QN AUTO: 29.7 PG (ref 26.5–33)
MCHC RBC AUTO-ENTMCNC: 34.5 G/DL (ref 31.5–36.5)
MCV RBC AUTO: 86 FL (ref 78–100)
MONOCYTES # BLD AUTO: 0.7 10E3/UL (ref 0–1.3)
MONOCYTES NFR BLD AUTO: 6 %
MUCOUS THREADS #/AREA URNS LPF: PRESENT /LPF
NEUTROPHILS # BLD AUTO: 7.8 10E3/UL (ref 1.6–8.3)
NEUTROPHILS NFR BLD AUTO: 70 %
NITRATE UR QL: NEGATIVE
NRBC # BLD AUTO: 0 10E3/UL
NRBC BLD AUTO-RTO: 0 /100
PH UR STRIP: 6.5 [PH] (ref 4.7–8)
PLATELET # BLD AUTO: 368 10E3/UL (ref 150–450)
RBC # BLD AUTO: 5.38 10E6/UL (ref 4.4–5.9)
RBC URINE: >182 /HPF
SP GR UR STRIP: 1.03 (ref 1–1.03)
SQUAMOUS EPITHELIAL: 0 /HPF
UROBILINOGEN UR STRIP-MCNC: NORMAL MG/DL
WBC # BLD AUTO: 11.2 10E3/UL (ref 4–11)
WBC URINE: 14 /HPF

## 2022-03-21 PROCEDURE — 76775 US EXAM ABDO BACK WALL LIM: CPT | Mod: TC | Performed by: EMERGENCY MEDICINE

## 2022-03-21 PROCEDURE — 87086 URINE CULTURE/COLONY COUNT: CPT | Performed by: EMERGENCY MEDICINE

## 2022-03-21 PROCEDURE — 85004 AUTOMATED DIFF WBC COUNT: CPT | Performed by: EMERGENCY MEDICINE

## 2022-03-21 PROCEDURE — 99284 EMERGENCY DEPT VISIT MOD MDM: CPT | Mod: 25 | Performed by: EMERGENCY MEDICINE

## 2022-03-21 PROCEDURE — 36415 COLL VENOUS BLD VENIPUNCTURE: CPT | Performed by: EMERGENCY MEDICINE

## 2022-03-21 PROCEDURE — 99285 EMERGENCY DEPT VISIT HI MDM: CPT | Mod: 25

## 2022-03-21 PROCEDURE — 76775 US EXAM ABDO BACK WALL LIM: CPT | Mod: 26 | Performed by: EMERGENCY MEDICINE

## 2022-03-21 PROCEDURE — 80053 COMPREHEN METABOLIC PANEL: CPT | Performed by: EMERGENCY MEDICINE

## 2022-03-21 PROCEDURE — 81003 URINALYSIS AUTO W/O SCOPE: CPT | Performed by: EMERGENCY MEDICINE

## 2022-03-21 RX ORDER — ARIPIPRAZOLE 5 MG/1
5 TABLET ORAL DAILY
Status: ON HOLD | COMMUNITY
End: 2023-01-18

## 2022-03-22 ENCOUNTER — APPOINTMENT (OUTPATIENT)
Dept: CT IMAGING | Facility: HOSPITAL | Age: 22
End: 2022-03-22
Attending: EMERGENCY MEDICINE
Payer: COMMERCIAL

## 2022-03-22 VITALS
RESPIRATION RATE: 18 BRPM | DIASTOLIC BLOOD PRESSURE: 84 MMHG | TEMPERATURE: 97.3 F | OXYGEN SATURATION: 98 % | HEART RATE: 96 BPM | SYSTOLIC BLOOD PRESSURE: 123 MMHG

## 2022-03-22 LAB
ALBUMIN SERPL-MCNC: 4.2 G/DL (ref 3.4–5)
ALP SERPL-CCNC: 73 U/L (ref 40–150)
ALT SERPL W P-5'-P-CCNC: 19 U/L (ref 0–70)
ANION GAP SERPL CALCULATED.3IONS-SCNC: 6 MMOL/L (ref 3–14)
AST SERPL W P-5'-P-CCNC: 14 U/L (ref 0–45)
BILIRUB SERPL-MCNC: 0.8 MG/DL (ref 0.2–1.3)
BUN SERPL-MCNC: 12 MG/DL (ref 7–30)
CALCIUM SERPL-MCNC: 9.4 MG/DL (ref 8.5–10.1)
CHLORIDE BLD-SCNC: 105 MMOL/L (ref 94–109)
CO2 SERPL-SCNC: 27 MMOL/L (ref 20–32)
CREAT SERPL-MCNC: 1.18 MG/DL (ref 0.66–1.25)
GFR SERPL CREATININE-BSD FRML MDRD: 89 ML/MIN/1.73M2
GLUCOSE BLD-MCNC: 102 MG/DL (ref 70–99)
POTASSIUM BLD-SCNC: 3.8 MMOL/L (ref 3.4–5.3)
PROT SERPL-MCNC: 7.8 G/DL (ref 6.8–8.8)
SODIUM SERPL-SCNC: 138 MMOL/L (ref 133–144)

## 2022-03-22 PROCEDURE — 74178 CT ABD&PLV WO CNTR FLWD CNTR: CPT

## 2022-03-22 PROCEDURE — 250N000011 HC RX IP 250 OP 636: Performed by: EMERGENCY MEDICINE

## 2022-03-22 RX ORDER — TAMSULOSIN HYDROCHLORIDE 0.4 MG/1
0.4 CAPSULE ORAL DAILY
Qty: 10 CAPSULE | Refills: 0 | Status: SHIPPED | OUTPATIENT
Start: 2022-03-22 | End: 2022-04-01

## 2022-03-22 RX ORDER — IOPAMIDOL 755 MG/ML
120 INJECTION, SOLUTION INTRAVASCULAR ONCE
Status: COMPLETED | OUTPATIENT
Start: 2022-03-22 | End: 2022-03-22

## 2022-03-22 RX ADMIN — IOPAMIDOL 120 ML: 755 INJECTION, SOLUTION INTRAVENOUS at 00:49

## 2022-03-22 NOTE — DISCHARGE INSTRUCTIONS
Come back if you have severe back pain, fever, chills, or pain while urinating.    What to expect when you have contrast    During your exam, we will inject  contrast  into your vein or artery. (Contrast is a clear liquid with iodine in it. It shows up on X-rays.)    You may feel warm or hot. You may have a metal taste in your mouth and a slight upset stomach. You may also feel pressure near the kidneys and bladder. These effects will last about 1 to 3 minutes.    Please tell us if you have:   Sneezing    Itching   Hives    Swelling in the face   A hoarse voice   Breathing problems   Other new symptoms    Serious problems are rare.  They may include:   Irregular heartbeat    Seizures   Kidney failure             Tissue damage   Shock     Death    If you have any problems during the exam, we  will treat them right away.    When you get home    Call your hospital if you have any new symptoms in the next 2 days, like hives or swelling. (Phone numbers are at the bottom of this page.) Or call your family doctor.     If you have wheezing or trouble breathing, call 911.    Self-care  -Drink at least 4 extra glasses of water today.   This reduces the stress on your kidneys.  -Keep taking your regular medicines.    The contrast will pass out of your body in your  Urine(pee). This will happen in the next 24 hours. You  will not feel this. Your urine will not  change color.    If you have kidney problems or take metformin    Drink 4 to 8 large glasses of water for the next  2 days, if you are not on a fluid restriction.    ?If you take metformin (Glucophage or Glucovance) for diabetes, keep taking it.      ?Your kidney function tests are abnormal.  If you take Metformin, do not take it for 48 hours. Please go to your clinic for a blood test within 3 days after your exam before the restarting this medicine.     (Note to provider:please give patient prescription for lab tests.)    ?Special instructions: Drink 4 to 8 large  glasses of water for the next  2 days, if you are not on a fluid restriction.    I have read and understand the above information.    Patient Sign Here:______________________________________Date:________Time:______    Staff Sign Here:________________________________________Date:_______Time:______      Radiology Departments:     ?[facility]: [phone] ?[facility]: [phone]     ?[facility]: [phone] ?[facility]:[phone]      ?[facility]: [phone]  ?[facility]: [phone]  ?[facility]:[phone]    ?[facility]:[phone]  ?[facility]:[phone]

## 2022-03-22 NOTE — ED TRIAGE NOTES
States that for past 2 weeks he has had red colored urine and before that it was coffee colored. States being seen at  in [place] a couple weeks. States having only 1 kidney.

## 2022-03-23 LAB — BACTERIA UR CULT: NORMAL

## 2022-03-24 ASSESSMENT — ENCOUNTER SYMPTOMS
SHORTNESS OF BREATH: 0
FEVER: 0
CHILLS: 0

## 2022-03-24 NOTE — ED PROVIDER NOTES
History     Chief Complaint   Patient presents with     Hematuria     HPI  Nnamdi Weeks is a 21 year old male who is here with hematuria.  Is been going on for several months.  Has a little bit of left flank pain.  States he only has 1 kidney, was born that way.  No dysuria.  Thinks his hematuria gets worse when he does methamphetamine.  No fever no chills.  No other abdominal pain.  No vomiting or diarrhea.    Allergies:  Allergies   Allergen Reactions     Other [No Clinical Screening - See Comments]      Environmental allergies       Methylphenidate Rash     At patch site; Daytrana         Problem List:    Patient Active Problem List    Diagnosis Date Noted     Bipolar I disorder (H) 04/07/2016     Priority: Medium     Attention deficit hyperactivity disorder (ADHD), predominantly inattentive type 04/07/2016     Priority: Medium        Past Medical History:    Past Medical History:   Diagnosis Date     Abdominal pain, unspecified site 11/14/2008     ADHD (attention deficit hyperactivity disorder) 12/13/2012     Amphetamine user (H)      Anxiety disorder 03/19/2015     Asthma,unspecified type, unspecified 05/25/2005     Bronchitis, not specified as acute or chronic 03/24/2005     Chemical dependency (H) 10/20/2014     Conduct disorder 10/20/2014     Deviated nasal septum      Diarrhea 09/27/2005     Disruptive behavior disorder 03/19/2015     Hearing deficit, bilateral 09/04/2014     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/13/2012     IBS (irritable bowel syndrome)      Intermittent explosive disorder      Lactose intolerance 12/13/2012     OCD (obsessive compulsive disorder) 03/09/2015     ODD (oppositional defiant disorder) 03/09/2015     Other orthopedic aftercare(V54.89) 10/08/2004     Polyphagia(783.6) 12/07/2007     PTSD (post-traumatic stress disorder) 03/19/2015     Routine infant or child health check 09/12/2007     Sensorineural hearing loss 11/03/2004       Past Surgical History:    Past Surgical History:   Procedure  Laterality Date     ADENOIDECTOMY       TONSILLECTOMY         Family History:    Family History   Problem Relation Age of Onset     Allergies Sister      Allergies Mother      Asthma Sister      Heart Disease Maternal Grandmother         disease     Schizophrenia Brother        Social History:  Marital Status:  Single [1]  Social History     Tobacco Use     Smoking status: Current Every Day Smoker     Packs/day: 1.00     Smokeless tobacco: Former User     Tobacco comment: no passive exposure   Substance Use Topics     Alcohol use: No     Drug use: Yes     Types: Marijuana     Comment: last used this am        Medications:    albuterol (PROAIR HFA/PROVENTIL HFA/VENTOLIN HFA) 108 (90 Base) MCG/ACT Inhaler  ARIPiprazole (ABILIFY) 5 MG tablet  fluticasone (FLONASE) 50 MCG/ACT spray  tamsulosin (FLOMAX) 0.4 MG capsule          Review of Systems   Constitutional: Negative for chills and fever.   Respiratory: Negative for shortness of breath.    Cardiovascular: Negative for chest pain.   All other systems reviewed and are negative.      Physical Exam   BP: 111/79  Pulse: 112  Temp: 97.3  F (36.3  C)  Resp: 16  SpO2: 96 %      Physical Exam  Constitutional:       General: He is not in acute distress.     Appearance: Normal appearance.   HENT:      Head: Normocephalic and atraumatic.      Right Ear: External ear normal.      Left Ear: External ear normal.      Nose: Nose normal. No rhinorrhea.   Eyes:      Conjunctiva/sclera: Conjunctivae normal.   Cardiovascular:      Rate and Rhythm: Normal rate and regular rhythm.      Pulses: Normal pulses.   Pulmonary:      Effort: Pulmonary effort is normal. No respiratory distress.      Breath sounds: Normal breath sounds.   Abdominal:      General: There is no distension.      Palpations: Abdomen is soft.      Tenderness: There is no abdominal tenderness.   Musculoskeletal:         General: No deformity or signs of injury.   Skin:     General: Skin is warm and dry.      Capillary  Refill: Capillary refill takes less than 2 seconds.   Neurological:      General: No focal deficit present.      Mental Status: He is alert. Mental status is at baseline.   Psychiatric:         Mood and Affect: Mood normal.         Behavior: Behavior normal.         ED Course                 Procedures    Results for orders placed during the hospital encounter of 03/21/22    POC US RETROPERITONEAL LIMITED    Impression  [facility] Procedure Note  Limited Bedside ED Renal Ultrasound:    PERFORMED BY: Dr. Jeremiah Mcqueen MD  INDICATIONS:  Hematuria  PROBE: Low frequency convex probe  BODY LOCATION:  Abdomen  FINDINGS:  The ultrasound was performed with longitudinal and transverse views.  Right Kidney:  Hydronephrosis:  None  Renal cyst:  None  Left Kidney:  Hydronephrosis:  None  Renal cyst:  None  INTERPRETATION:  The evaluation of the kidneys was normal without evidence of hydronephrosis or cysts.  IMAGE DOCUMENTATION: Images were archived to PACs system.           Critical Care time:               No results found for this or any previous visit (from the past 24 hour(s)).    Medications   iopamidol (ISOVUE-370) solution 120 mL (120 mLs Intravenous Given 3/22/22 0049)   sodium chloride (PF) 0.9% PF flush 150 mL (140 mLs Intravenous Given 3/22/22 0048)       Assessments & Plan (with Medical Decision Making)     I have reviewed the nursing notes.    I have reviewed the findings, diagnosis, plan and need for follow up with the patient.  21-year-old male here with with hematuria for several months.  Has horseshoe kidney.  I see no previous CT imaging of abdomen pelvis.  Given long duration, urinalysis not genuinely consistent with stone or infection, will get CT urogram.  CT urogram showed stone.  Flomax urology follow-up pain control.    Discharge Medication List as of 3/22/2022  4:06 AM      START taking these medications    Details   tamsulosin (FLOMAX) 0.4 MG capsule Take 1 capsule (0.4 mg) by mouth daily for  10 doses, Disp-10 capsule, R-0, E-Prescribe             Final diagnoses:   Kidney stone       3/21/2022   [facility]     Jeremiah Mcqueen MD  03/24/22 0046

## 2022-08-05 ENCOUNTER — HOSPITAL ENCOUNTER (EMERGENCY)
Facility: HOSPITAL | Age: 22
Discharge: LEFT BEFORE DISCHARGE | End: 2022-08-06
Attending: INTERNAL MEDICINE | Admitting: INTERNAL MEDICINE
Payer: COMMERCIAL

## 2022-08-05 DIAGNOSIS — Z20.2 EXPOSURE TO STD: ICD-10-CM

## 2022-08-05 PROCEDURE — 99283 EMERGENCY DEPT VISIT LOW MDM: CPT | Performed by: INTERNAL MEDICINE

## 2022-08-05 PROCEDURE — 99283 EMERGENCY DEPT VISIT LOW MDM: CPT

## 2022-08-06 VITALS
OXYGEN SATURATION: 98 % | RESPIRATION RATE: 16 BRPM | HEART RATE: 77 BPM | DIASTOLIC BLOOD PRESSURE: 72 MMHG | SYSTOLIC BLOOD PRESSURE: 123 MMHG | TEMPERATURE: 97.3 F

## 2022-08-06 LAB
C TRACH DNA SPEC QL PROBE+SIG AMP: NEGATIVE
N GONORRHOEA DNA SPEC QL NAA+PROBE: NEGATIVE

## 2022-08-06 PROCEDURE — 87491 CHLMYD TRACH DNA AMP PROBE: CPT | Performed by: INTERNAL MEDICINE

## 2022-08-06 PROCEDURE — 36415 COLL VENOUS BLD VENIPUNCTURE: CPT | Performed by: INTERNAL MEDICINE

## 2022-08-06 PROCEDURE — 87591 N.GONORRHOEAE DNA AMP PROB: CPT | Performed by: INTERNAL MEDICINE

## 2022-08-06 PROCEDURE — 86780 TREPONEMA PALLIDUM: CPT | Performed by: INTERNAL MEDICINE

## 2022-08-06 NOTE — ED TRIAGE NOTES
Patient presents to emergency room with c/o possible exposure to STI. Pt reports his significant other recently cheated on him and wants to be checked for STI's. Pt concerned about syphilis. Denies any STI symptoms.

## 2022-08-07 LAB — T PALLIDUM AB SER QL: NONREACTIVE

## 2022-08-09 ASSESSMENT — ENCOUNTER SYMPTOMS
HEMATURIA: 0
NECK STIFFNESS: 0
EYE REDNESS: 0
HEADACHES: 0
DYSURIA: 0
FEVER: 0
ABDOMINAL PAIN: 0
DIFFICULTY URINATING: 0
COLOR CHANGE: 0
CONFUSION: 0
FLANK PAIN: 0
SHORTNESS OF BREATH: 0
ARTHRALGIAS: 0

## 2022-11-17 ENCOUNTER — HOSPITAL ENCOUNTER (EMERGENCY)
Facility: HOSPITAL | Age: 22
Discharge: HOME | End: 2022-11-17
Attending: PHYSICIAN ASSISTANT | Admitting: PHYSICIAN ASSISTANT
Payer: COMMERCIAL

## 2022-11-17 VITALS
RESPIRATION RATE: 16 BRPM | OXYGEN SATURATION: 96 % | SYSTOLIC BLOOD PRESSURE: 144 MMHG | TEMPERATURE: 97.3 F | HEART RATE: 100 BPM | DIASTOLIC BLOOD PRESSURE: 85 MMHG

## 2022-11-17 DIAGNOSIS — S69.92XD HAND INJURY, LEFT, SUBSEQUENT ENCOUNTER: ICD-10-CM

## 2022-11-17 PROCEDURE — G0463 HOSPITAL OUTPT CLINIC VISIT: HCPCS

## 2022-11-17 PROCEDURE — 99213 OFFICE O/P EST LOW 20 MIN: CPT | Performed by: PHYSICIAN ASSISTANT

## 2022-11-17 RX ORDER — HYDROCODONE BITARTRATE AND ACETAMINOPHEN 5; 325 MG/1; MG/1
1 TABLET ORAL 2 TIMES DAILY PRN
Qty: 6 TABLET | Refills: 0 | Status: SHIPPED | OUTPATIENT
Start: 2022-11-17 | End: 2022-11-20

## 2022-11-17 NOTE — ED PROVIDER NOTES
History     Chief Complaint   Patient presents with     Hand Injury     HPI  Nnamdi Weeks is a 22 year old male who presents to urgent care with complaint of left hand pain.  Patient states he was seen this past Monday in [place] through [facility] and diagnosed with a boxer's fracture of his left fifth metacarpal.  Patient states that his splint that was applied was too painful and that he removed it.  He states that he has had progressively increasing pain in this area since the injury.  Patient was referred to orthopedics for follow-up however he has not called to make that appointment yet stating that he broke his phone.  Patient states he has been rotating Tylenol and ibuprofen every 4 hours for his pain which is not working.  Patient has a history of methamphetamine abuse but states he has been clean for proximately 4 months.    Allergies:  Allergies   Allergen Reactions     Other [No Clinical Screening - See Comments]      Environmental allergies       Methylphenidate Rash     At patch site; Daytrana         Problem List:    Patient Active Problem List    Diagnosis Date Noted     Bipolar I disorder (H) 04/07/2016     Priority: Medium     Attention deficit hyperactivity disorder (ADHD), predominantly inattentive type 04/07/2016     Priority: Medium        Past Medical History:    Past Medical History:   Diagnosis Date     Abdominal pain, unspecified site 11/14/2008     ADHD (attention deficit hyperactivity disorder) 12/13/2012     Amphetamine user (H)      Anxiety disorder 03/19/2015     Asthma,unspecified type, unspecified 05/25/2005     Bronchitis, not specified as acute or chronic 03/24/2005     Chemical dependency (H) 10/20/2014     Conduct disorder 10/20/2014     Deviated nasal septum      Diarrhea 09/27/2005     Disruptive behavior disorder 03/19/2015     Hearing deficit, bilateral 09/04/2014     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/13/2012     IBS (irritable bowel syndrome)      Intermittent explosive disorder       Lactose intolerance 12/13/2012     OCD (obsessive compulsive disorder) 03/09/2015     ODD (oppositional defiant disorder) 03/09/2015     Other orthopedic aftercare(V54.89) 10/08/2004     Polyphagia(783.6) 12/07/2007     PTSD (post-traumatic stress disorder) 03/19/2015     Routine infant or child health check 09/12/2007     Sensorineural hearing loss 11/03/2004       Past Surgical History:    Past Surgical History:   Procedure Laterality Date     ADENOIDECTOMY       TONSILLECTOMY         Family History:    Family History   Problem Relation Age of Onset     Allergies Sister      Allergies Mother      Asthma Sister      Heart Disease Maternal Grandmother         disease     Schizophrenia Brother        Social History:  Marital Status:  Single [1]  Social History     Tobacco Use     Smoking status: Every Day     Packs/day: 1.00     Types: Cigarettes     Smokeless tobacco: Former     Tobacco comments:     no passive exposure   Substance Use Topics     Alcohol use: No     Drug use: Yes     Types: Marijuana     Comment: last used this am        Medications:    albuterol (PROAIR HFA/PROVENTIL HFA/VENTOLIN HFA) 108 (90 Base) MCG/ACT Inhaler  ARIPiprazole (ABILIFY) 5 MG tablet  fluticasone (FLONASE) 50 MCG/ACT spray  HYDROcodone-acetaminophen (NORCO) 5-325 MG tablet          Review of Systems   Musculoskeletal:        Left hand injury   All other systems reviewed and are negative.      Physical Exam   BP: 144/85  Pulse: 100  Temp: 97.3  F (36.3  C)  Resp: 16  SpO2: 96 %      Physical Exam  Vitals and nursing note reviewed.   Constitutional:       General: He is not in acute distress.     Appearance: Normal appearance. He is not ill-appearing or toxic-appearing.   Cardiovascular:      Rate and Rhythm: Regular rhythm.      Heart sounds: Normal heart sounds.   Pulmonary:      Breath sounds: Normal breath sounds.   Musculoskeletal:      Comments: Swelling noted of patient's left hand.  Patient is extremely tender to palpation  over his fourth and fifth MCPs, reduced range of motion and strength secondary to pain.  CMS intact.   Neurological:      Mental Status: He is oriented to person, place, and time.         ED Course                 Procedures             Critical Care time:               No results found for this or any previous visit (from the past 24 hour(s)).    Medications - No data to display    Assessments & Plan (with Medical Decision Making)   #1.  Left hand injury, subsequent encounter    Discussed exam findings with patient.  I offered to splint patient's hand however he declined stating that it is too painful to have a splint.  Patient was given 6 tabs of Norco 5-325 mg for severe pain.  He was instructed that he will receive no more narcotic pain medication from urgent care.  I reviewed appropriate dosing of Tylenol and ibuprofen along with elevating and icing his hand to help with pain.  Patient needs to contact orthopedics today to make his appointment and receive any additional pain medication through that orthopedic provider.  Any additional concerns patient can return to urgent care or follow-up with primary care provider.  Patient verbalized understanding and agreement of plan.    I have reviewed the nursing notes.    I have reviewed the findings, diagnosis, plan and need for follow up with the patient.    Discharge Medication List as of 11/17/2022 12:46 PM      START taking these medications    Details   HYDROcodone-acetaminophen (NORCO) 5-325 MG tablet Take 1 tablet by mouth 2 times daily as needed for severe pain (7-10), Disp-6 tablet, R-0, E-Prescribe             Final diagnoses:   Hand injury, left, subsequent encounter       11/17/2022   [facility]     Sean Gonzales PA-C  11/17/22 2451

## 2022-11-17 NOTE — ED TRIAGE NOTES
Patient presents to urgent care with c/o left hand pain. States he punched the glove box a car a week ago and the pain will not go away. States he iced, elevated, used ibuprofen and tylenol and no relief. States he went to [place] and had xray's done and gave him a splint and it made it hurt even worse so he took it off. States he just needs something for the pain and will be getting into orthopedics for an appointment. Pain 9/10.

## 2023-01-16 ENCOUNTER — HOSPITAL ENCOUNTER (EMERGENCY)
Facility: HOSPITAL | Age: 23
Discharge: LEFT BEFORE DISCHARGE | End: 2023-01-16
Payer: COMMERCIAL

## 2023-01-17 ENCOUNTER — APPOINTMENT (OUTPATIENT)
Dept: CT IMAGING | Facility: HOSPITAL | Age: 23
End: 2023-01-17
Attending: STUDENT IN AN ORGANIZED HEALTH CARE EDUCATION/TRAINING PROGRAM
Payer: COMMERCIAL

## 2023-01-17 ENCOUNTER — HOSPITAL ENCOUNTER (EMERGENCY)
Facility: HOSPITAL | Age: 23
Discharge: HOME | End: 2023-01-17
Attending: STUDENT IN AN ORGANIZED HEALTH CARE EDUCATION/TRAINING PROGRAM | Admitting: STUDENT IN AN ORGANIZED HEALTH CARE EDUCATION/TRAINING PROGRAM
Payer: COMMERCIAL

## 2023-01-17 ENCOUNTER — HOSPITAL ENCOUNTER (OUTPATIENT)
Facility: OTHER | Age: 23
Setting detail: OBSERVATION
Discharge: HOME | End: 2023-01-18
Attending: FAMILY MEDICINE | Admitting: FAMILY MEDICINE
Payer: COMMERCIAL

## 2023-01-17 VITALS
SYSTOLIC BLOOD PRESSURE: 141 MMHG | WEIGHT: 193.5 LBS | HEART RATE: 92 BPM | RESPIRATION RATE: 18 BRPM | TEMPERATURE: 99.4 F | DIASTOLIC BLOOD PRESSURE: 94 MMHG | OXYGEN SATURATION: 100 % | BODY MASS INDEX: 24.84 KG/M2

## 2023-01-17 DIAGNOSIS — N20.1 URETEROLITHIASIS: ICD-10-CM

## 2023-01-17 DIAGNOSIS — N20.0 KIDNEY STONE ON LEFT SIDE: Primary | ICD-10-CM

## 2023-01-17 DIAGNOSIS — N34.2 URETHRITIS: ICD-10-CM

## 2023-01-17 DIAGNOSIS — N20.1 LEFT URETERAL STONE: ICD-10-CM

## 2023-01-17 PROBLEM — N30.00 ACUTE CYSTITIS: Status: ACTIVE | Noted: 2023-01-17

## 2023-01-17 LAB
ALBUMIN SERPL BCG-MCNC: 5.2 G/DL (ref 3.5–5.2)
ALBUMIN UR-MCNC: 30 MG/DL
ALP SERPL-CCNC: 73 U/L (ref 40–129)
ALT SERPL W P-5'-P-CCNC: 20 U/L (ref 10–50)
ANION GAP SERPL CALCULATED.3IONS-SCNC: 12 MMOL/L (ref 7–15)
APPEARANCE UR: CLEAR
AST SERPL W P-5'-P-CCNC: 24 U/L (ref 10–50)
BASOPHILS # BLD AUTO: 0.1 10E3/UL (ref 0–0.2)
BASOPHILS NFR BLD AUTO: 1 %
BILIRUB SERPL-MCNC: 0.6 MG/DL
BILIRUB UR QL STRIP: NEGATIVE
BUN SERPL-MCNC: 11.7 MG/DL (ref 6–20)
C TRACH DNA SPEC QL PROBE+SIG AMP: NEGATIVE
CALCIUM SERPL-MCNC: 10.4 MG/DL (ref 8.6–10)
CHLORIDE SERPL-SCNC: 101 MMOL/L (ref 98–107)
COLOR UR AUTO: YELLOW
CREAT SERPL-MCNC: 1.05 MG/DL (ref 0.67–1.17)
DEPRECATED HCO3 PLAS-SCNC: 27 MMOL/L (ref 22–29)
EOSINOPHIL # BLD AUTO: 0.1 10E3/UL (ref 0–0.7)
EOSINOPHIL NFR BLD AUTO: 1 %
ERYTHROCYTE [DISTWIDTH] IN BLOOD BY AUTOMATED COUNT: 12.1 % (ref 10–15)
GFR SERPL CREATININE-BSD FRML MDRD: >90 ML/MIN/1.73M2
GLUCOSE SERPL-MCNC: 94 MG/DL (ref 70–99)
GLUCOSE UR STRIP-MCNC: NEGATIVE MG/DL
HCT VFR BLD AUTO: 48.4 % (ref 40–53)
HGB BLD-MCNC: 16.6 G/DL (ref 13.3–17.7)
HGB UR QL STRIP: ABNORMAL
IMM GRANULOCYTES # BLD: 0.1 10E3/UL
IMM GRANULOCYTES NFR BLD: 1 %
KETONES UR STRIP-MCNC: 40 MG/DL
LEUKOCYTE ESTERASE UR QL STRIP: ABNORMAL
LYMPHOCYTES # BLD AUTO: 2 10E3/UL (ref 0.8–5.3)
LYMPHOCYTES NFR BLD AUTO: 16 %
MCH RBC QN AUTO: 29.7 PG (ref 26.5–33)
MCHC RBC AUTO-ENTMCNC: 34.3 G/DL (ref 31.5–36.5)
MCV RBC AUTO: 87 FL (ref 78–100)
MONOCYTES # BLD AUTO: 0.8 10E3/UL (ref 0–1.3)
MONOCYTES NFR BLD AUTO: 6 %
MUCOUS THREADS #/AREA URNS LPF: PRESENT /LPF
N GONORRHOEA DNA SPEC QL NAA+PROBE: NEGATIVE
NEUTROPHILS # BLD AUTO: 10 10E3/UL (ref 1.6–8.3)
NEUTROPHILS NFR BLD AUTO: 75 %
NITRATE UR QL: NEGATIVE
NRBC # BLD AUTO: 0 10E3/UL
NRBC BLD AUTO-RTO: 0 /100
PH UR STRIP: 6 [PH] (ref 4.7–8)
PLATELET # BLD AUTO: 315 10E3/UL (ref 150–450)
POTASSIUM SERPL-SCNC: 3.8 MMOL/L (ref 3.4–5.3)
PROT SERPL-MCNC: 8.1 G/DL (ref 6.4–8.3)
RBC # BLD AUTO: 5.59 10E6/UL (ref 4.4–5.9)
RBC URINE: 23 /HPF
SODIUM SERPL-SCNC: 140 MMOL/L (ref 136–145)
SP GR UR STRIP: 1.02 (ref 1–1.03)
SQUAMOUS EPITHELIAL: 0 /HPF
T VAGINALIS DNA SPEC QL NAA+PROBE: NOT DETECTED
UROBILINOGEN UR STRIP-MCNC: NORMAL MG/DL
WBC # BLD AUTO: 13.1 10E3/UL (ref 4–11)
WBC URINE: 84 /HPF

## 2023-01-17 PROCEDURE — 85025 COMPLETE CBC W/AUTO DIFF WBC: CPT | Performed by: STUDENT IN AN ORGANIZED HEALTH CARE EDUCATION/TRAINING PROGRAM

## 2023-01-17 PROCEDURE — 86780 TREPONEMA PALLIDUM: CPT | Performed by: STUDENT IN AN ORGANIZED HEALTH CARE EDUCATION/TRAINING PROGRAM

## 2023-01-17 PROCEDURE — 120N000001 HC R&B MED SURG/OB

## 2023-01-17 PROCEDURE — 258N000003 HC RX IP 258 OP 636: Performed by: FAMILY MEDICINE

## 2023-01-17 PROCEDURE — 250N000011 HC RX IP 250 OP 636: Performed by: RADIOLOGY

## 2023-01-17 PROCEDURE — 80053 COMPREHEN METABOLIC PANEL: CPT | Performed by: STUDENT IN AN ORGANIZED HEALTH CARE EDUCATION/TRAINING PROGRAM

## 2023-01-17 PROCEDURE — 87661 TRICHOMONAS VAGINALIS AMPLIF: CPT | Performed by: STUDENT IN AN ORGANIZED HEALTH CARE EDUCATION/TRAINING PROGRAM

## 2023-01-17 PROCEDURE — 250N000011 HC RX IP 250 OP 636: Performed by: STUDENT IN AN ORGANIZED HEALTH CARE EDUCATION/TRAINING PROGRAM

## 2023-01-17 PROCEDURE — 250N000013 HC RX MED GY IP 250 OP 250 PS 637: Performed by: STUDENT IN AN ORGANIZED HEALTH CARE EDUCATION/TRAINING PROGRAM

## 2023-01-17 PROCEDURE — 250N000013 HC RX MED GY IP 250 OP 250 PS 637: Performed by: FAMILY MEDICINE

## 2023-01-17 PROCEDURE — 74177 CT ABD & PELVIS W/CONTRAST: CPT

## 2023-01-17 PROCEDURE — 87389 HIV-1 AG W/HIV-1&-2 AB AG IA: CPT | Performed by: STUDENT IN AN ORGANIZED HEALTH CARE EDUCATION/TRAINING PROGRAM

## 2023-01-17 PROCEDURE — 87040 BLOOD CULTURE FOR BACTERIA: CPT | Performed by: STUDENT IN AN ORGANIZED HEALTH CARE EDUCATION/TRAINING PROGRAM

## 2023-01-17 PROCEDURE — 87086 URINE CULTURE/COLONY COUNT: CPT | Performed by: STUDENT IN AN ORGANIZED HEALTH CARE EDUCATION/TRAINING PROGRAM

## 2023-01-17 PROCEDURE — 36415 COLL VENOUS BLD VENIPUNCTURE: CPT | Performed by: STUDENT IN AN ORGANIZED HEALTH CARE EDUCATION/TRAINING PROGRAM

## 2023-01-17 PROCEDURE — 96375 TX/PRO/DX INJ NEW DRUG ADDON: CPT

## 2023-01-17 PROCEDURE — 99285 EMERGENCY DEPT VISIT HI MDM: CPT | Mod: 25

## 2023-01-17 PROCEDURE — 87491 CHLMYD TRACH DNA AMP PROBE: CPT | Performed by: STUDENT IN AN ORGANIZED HEALTH CARE EDUCATION/TRAINING PROGRAM

## 2023-01-17 PROCEDURE — 96365 THER/PROPH/DIAG IV INF INIT: CPT | Mod: XU

## 2023-01-17 PROCEDURE — 99284 EMERGENCY DEPT VISIT MOD MDM: CPT | Performed by: STUDENT IN AN ORGANIZED HEALTH CARE EDUCATION/TRAINING PROGRAM

## 2023-01-17 PROCEDURE — 99222 1ST HOSP IP/OBS MODERATE 55: CPT | Performed by: FAMILY MEDICINE

## 2023-01-17 PROCEDURE — 81003 URINALYSIS AUTO W/O SCOPE: CPT | Performed by: STUDENT IN AN ORGANIZED HEALTH CARE EDUCATION/TRAINING PROGRAM

## 2023-01-17 RX ORDER — SODIUM CHLORIDE 9 MG/ML
INJECTION, SOLUTION INTRAVENOUS CONTINUOUS
Status: DISCONTINUED | OUTPATIENT
Start: 2023-01-17 | End: 2023-01-18 | Stop reason: HOSPADM

## 2023-01-17 RX ORDER — CEFTRIAXONE SODIUM 2 G/50ML
2 INJECTION, SOLUTION INTRAVENOUS ONCE
Status: COMPLETED | OUTPATIENT
Start: 2023-01-17 | End: 2023-01-17

## 2023-01-17 RX ORDER — KETOROLAC TROMETHAMINE 30 MG/ML
30 INJECTION, SOLUTION INTRAMUSCULAR; INTRAVENOUS EVERY 6 HOURS PRN
Status: DISCONTINUED | OUTPATIENT
Start: 2023-01-17 | End: 2023-01-18 | Stop reason: HOSPADM

## 2023-01-17 RX ORDER — NALOXONE HYDROCHLORIDE 0.4 MG/ML
0.2 INJECTION, SOLUTION INTRAMUSCULAR; INTRAVENOUS; SUBCUTANEOUS
Status: DISCONTINUED | OUTPATIENT
Start: 2023-01-17 | End: 2023-01-18 | Stop reason: HOSPADM

## 2023-01-17 RX ORDER — ONDANSETRON 2 MG/ML
4 INJECTION INTRAMUSCULAR; INTRAVENOUS EVERY 30 MIN PRN
Status: DISCONTINUED | OUTPATIENT
Start: 2023-01-17 | End: 2023-01-17 | Stop reason: HOSPADM

## 2023-01-17 RX ORDER — IOPAMIDOL 755 MG/ML
86 INJECTION, SOLUTION INTRAVASCULAR ONCE
Status: COMPLETED | OUTPATIENT
Start: 2023-01-17 | End: 2023-01-17

## 2023-01-17 RX ORDER — OXYCODONE HYDROCHLORIDE 5 MG/1
5 TABLET ORAL EVERY 4 HOURS PRN
Status: DISCONTINUED | OUTPATIENT
Start: 2023-01-17 | End: 2023-01-18 | Stop reason: HOSPADM

## 2023-01-17 RX ORDER — ACETAMINOPHEN 325 MG/1
975 TABLET ORAL ONCE
Status: COMPLETED | OUTPATIENT
Start: 2023-01-17 | End: 2023-01-17

## 2023-01-17 RX ORDER — NALOXONE HYDROCHLORIDE 0.4 MG/ML
0.4 INJECTION, SOLUTION INTRAMUSCULAR; INTRAVENOUS; SUBCUTANEOUS
Status: DISCONTINUED | OUTPATIENT
Start: 2023-01-17 | End: 2023-01-18 | Stop reason: HOSPADM

## 2023-01-17 RX ORDER — ACETAMINOPHEN 325 MG/1
975 TABLET ORAL EVERY 8 HOURS
Status: DISCONTINUED | OUTPATIENT
Start: 2023-01-18 | End: 2023-01-18 | Stop reason: HOSPADM

## 2023-01-17 RX ORDER — ONDANSETRON 2 MG/ML
4 INJECTION INTRAMUSCULAR; INTRAVENOUS EVERY 6 HOURS PRN
Status: DISCONTINUED | OUTPATIENT
Start: 2023-01-17 | End: 2023-01-18 | Stop reason: HOSPADM

## 2023-01-17 RX ORDER — TAMSULOSIN HYDROCHLORIDE 0.4 MG/1
0.4 CAPSULE ORAL DAILY
Status: DISCONTINUED | OUTPATIENT
Start: 2023-01-17 | End: 2023-01-18 | Stop reason: HOSPADM

## 2023-01-17 RX ORDER — ONDANSETRON 4 MG/1
4 TABLET, ORALLY DISINTEGRATING ORAL EVERY 6 HOURS PRN
Status: DISCONTINUED | OUTPATIENT
Start: 2023-01-17 | End: 2023-01-18 | Stop reason: HOSPADM

## 2023-01-17 RX ORDER — DIVALPROEX SODIUM 500 MG/1
500 TABLET, EXTENDED RELEASE ORAL AT BEDTIME
COMMUNITY
Start: 2022-12-26 | End: 2023-10-06

## 2023-01-17 RX ORDER — LIDOCAINE 40 MG/G
CREAM TOPICAL
Status: DISCONTINUED | OUTPATIENT
Start: 2023-01-17 | End: 2023-01-18 | Stop reason: HOSPADM

## 2023-01-17 RX ORDER — HYDROMORPHONE HYDROCHLORIDE 1 MG/ML
0.5 INJECTION, SOLUTION INTRAMUSCULAR; INTRAVENOUS; SUBCUTANEOUS
Status: DISCONTINUED | OUTPATIENT
Start: 2023-01-17 | End: 2023-01-17 | Stop reason: HOSPADM

## 2023-01-17 RX ADMIN — CEFTRIAXONE SODIUM 2 G: 2 INJECTION, SOLUTION INTRAVENOUS at 16:07

## 2023-01-17 RX ADMIN — IOPAMIDOL 86 ML: 755 INJECTION, SOLUTION INTRAVENOUS at 14:18

## 2023-01-17 RX ADMIN — ACETAMINOPHEN 975 MG: 325 TABLET ORAL at 23:02

## 2023-01-17 RX ADMIN — ACETAMINOPHEN 975 MG: 325 TABLET, FILM COATED ORAL at 16:33

## 2023-01-17 RX ADMIN — OXYCODONE HYDROCHLORIDE 5 MG: 5 TABLET ORAL at 23:02

## 2023-01-17 RX ADMIN — TAMSULOSIN HYDROCHLORIDE 0.4 MG: 0.4 CAPSULE ORAL at 20:22

## 2023-01-17 RX ADMIN — HYDROMORPHONE HYDROCHLORIDE 0.5 MG: 1 INJECTION, SOLUTION INTRAMUSCULAR; INTRAVENOUS; SUBCUTANEOUS at 16:58

## 2023-01-17 RX ADMIN — SODIUM CHLORIDE: 9 INJECTION, SOLUTION INTRAVENOUS at 23:02

## 2023-01-17 RX ADMIN — ONDANSETRON 4 MG: 2 INJECTION INTRAMUSCULAR; INTRAVENOUS at 16:55

## 2023-01-17 ASSESSMENT — ACTIVITIES OF DAILY LIVING (ADL)
DIFFICULTY_EATING/SWALLOWING: NO
DOING_ERRANDS_INDEPENDENTLY_DIFFICULTY: NO
HEARING_DIFFICULTY_OR_DEAF: NO
WEAR_GLASSES_OR_BLIND: NO
TOILETING_ISSUES: NO
CONCENTRATING,_REMEMBERING_OR_MAKING_DECISIONS_DIFFICULTY: NO
ADLS_ACUITY_SCORE: 18
DIFFICULTY_COMMUNICATING: NO
ADLS_ACUITY_SCORE: 18
WALKING_OR_CLIMBING_STAIRS_DIFFICULTY: NO
FALL_HISTORY_WITHIN_LAST_SIX_MONTHS: NO
ADLS_ACUITY_SCORE: 35
ADLS_ACUITY_SCORE: 33
CHANGE_IN_FUNCTIONAL_STATUS_SINCE_ONSET_OF_CURRENT_ILLNESS/INJURY: YES
DRESSING/BATHING_DIFFICULTY: NO

## 2023-01-17 NOTE — ED NOTES
"At the end of Triage encounter, patient states, that he was also liked to be \"treated for trich.\"  "

## 2023-01-17 NOTE — ED NOTES
Pt discharged to transport by private vehicle to [facility] ER. Pt was informed that he needed to remain NPO per the hospitalist at [facility]. IV removed for transport. Pt given a copy of medication administered in the ER today per request due to pt being in treatment. Pt also sent with a packet of information pertaining to his visit to present to staff at [facility]. Pts girlfriend is here to drive patient to [facility].

## 2023-01-17 NOTE — ED NOTES
Pt presents with left flank pain and states he had blood in his urine yesterday. Pt states pain is sharp, non-radiating, 3/10. Pt has a hx of kidney stones, uti, and reports he has a horseshoe kidney. Pt also states he has been having diarrhea with watery stools and notes blood in his stool this morning. Pt is asking to be treated for trichomonas, reporting an exposure and states he gave it to his girlfriend. Pt is calm, cooperative, and pleasant. Pt changed into a gown.

## 2023-01-17 NOTE — ED PROVIDER NOTES
History     Chief Complaint   Patient presents with     Hematuria     HPI  Nnamdi Weeks is a 22 year old male with previous history of kidney stones presents to the emergency department today with concerns about left flank pain that is nonradiating is 3 out of 10 is sharp and feels like previous kidney stones and he also noticed some blood in his urine yesterday.  He notes he has a history of a horseshoe kidney.  He confirms that he also has been having some diarrhea and thought there was some blood in his stool this morning.  He also notes that he gave his girlfriend trichomoniasis and would like to be treated.  He has no other complaints at this time.    Allergies:  Allergies   Allergen Reactions     Other [No Clinical Screening - See Comments]      Environmental allergies       Seasonal Allergies      Grass, ragweed, dust      Methylphenidate Rash     At patch site; Daytrana         Problem List:    Patient Active Problem List    Diagnosis Date Noted     Bipolar I disorder (H) 04/07/2016     Priority: Medium     Attention deficit hyperactivity disorder (ADHD), predominantly inattentive type 04/07/2016     Priority: Medium        Past Medical History:    Past Medical History:   Diagnosis Date     Abdominal pain, unspecified site 11/14/2008     ADHD (attention deficit hyperactivity disorder) 12/13/2012     Amphetamine user      Anxiety disorder 03/19/2015     Asthma,unspecified type, unspecified 05/25/2005     Bronchitis, not specified as acute or chronic 03/24/2005     Chemical dependency (H) 10/20/2014     Conduct disorder 10/20/2014     Deviated nasal septum      Diarrhea 09/27/2005     Disruptive behavior disorder 03/19/2015     Hearing deficit, bilateral 09/04/2014     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/13/2012     IBS (irritable bowel syndrome)      Intermittent explosive disorder      Lactose intolerance 12/13/2012     OCD (obsessive compulsive disorder) 03/09/2015     ODD (oppositional defiant disorder) 03/09/2015      Other orthopedic aftercare(V54.89) 10/08/2004     Polyphagia(783.6) 12/07/2007     PTSD (post-traumatic stress disorder) 03/19/2015     Routine infant or child health check 09/12/2007     Sensorineural hearing loss 11/03/2004       Past Surgical History:    Past Surgical History:   Procedure Laterality Date     ADENOIDECTOMY       TONSILLECTOMY         Family History:    Family History   Problem Relation Age of Onset     Allergies Sister      Allergies Mother      Asthma Sister      Heart Disease Maternal Grandmother         disease     Schizophrenia Brother        Social History:  Marital Status:  Single [1]  Social History     Tobacco Use     Smoking status: Every Day     Types: Vaping Device     Smokeless tobacco: Former     Tobacco comments:     no passive exposure   Substance Use Topics     Alcohol use: Yes     Comment: occasional     Drug use: Yes     Types: Marijuana        Medications:    divalproex sodium extended-release (DEPAKOTE ER) 500 MG 24 hr tablet  albuterol (PROAIR HFA/PROVENTIL HFA/VENTOLIN HFA) 108 (90 Base) MCG/ACT Inhaler  ARIPiprazole (ABILIFY) 5 MG tablet  fluticasone (FLONASE) 50 MCG/ACT spray          Review of Systems  A complete review of systems was performed and is otherwise negative.     Physical Exam   BP: 135/82  Pulse: 109  Temp: 100.1  F (37.8  C)  Resp: 18  Weight: 87.8 kg (193 lb 8 oz)  SpO2: 97 %      Physical Exam  Constitutional: Alert and conversant. NAD   HENT: NCAT   Eyes: Normal pupils   Neck: supple   CV: Normal rate, regular rhythm, no murmur   Pulmonary/Chest: Non-labored respirations  Abdominal: Soft, non-tender, non-distended   MSK: RAMIREZ.   Neuro: Alert and appropriate   Skin: Warm and dry. No diaphoresis. No rashes on exposed skin    Psych: Appropriate mood and affect     ED Course              ED Course as of 01/17/23 1543   Tue Jan 17, 2023   1301 22 male here with concern for kidney stone.  Also asking for STD testing.  Urine appears dirty with some blood does  state he has a history of kidney stone.  CT demonstrates a 5 mm kidney stone during a previous visit he also seems to have a horseshoe kidney.  Unclear whether this is STD with urethritis or acute cystitis.  He is requesting a CT scan I think this is reasonable, we can use this to rule out a stone.  We will proceed with STD testing at this time.   1540 CT demonstrates a 9 mm stone.  He does have evidence of a UTI.  Unclear whether this is urethritis or acute cystitis.  Case discussed with the urologist.  Plan is to transfer the patient to [facility] for stenting.  Given the possibility that he has gonorrhea and chlamydia, will give ceftriaxone and azithromycin now.  Blood pressure is normal, he is afebrile, although the initial heart rate of 109 is charted he has been in the 80s throughout his entire stay here.  White blood cell count of 13.  With normal heart rates and no fever does not meet sepsis criteria, however, will order blood cultures out of an abundance of caution.     Procedures            Results for orders placed or performed during the hospital encounter of 01/17/23 (from the past 24 hour(s))   UA with Microscopic reflex to Culture    Specimen: Urine, Midstream   Result Value Ref Range    Color Urine Yellow Colorless, Straw, Light Yellow, Yellow    Appearance Urine Clear Clear    Glucose Urine Negative Negative mg/dL    Bilirubin Urine Negative Negative    Ketones Urine 40 (A) Negative mg/dL    Specific Gravity Urine 1.024 1.003 - 1.035    Blood Urine Small (A) Negative    pH Urine 6.0 4.7 - 8.0    Protein Albumin Urine 30 (A) Negative mg/dL    Urobilinogen Urine Normal Normal, 2.0 mg/dL    Nitrite Urine Negative Negative    Leukocyte Esterase Urine Large (A) Negative    Mucus Urine Present (A) None Seen /LPF    RBC Urine 23 (H) <=2 /HPF    WBC Urine 84 (H) <=5 /HPF    Squamous Epithelials Urine 0 <=1 /HPF    Narrative    Urine Culture ordered based on laboratory criteria   CBC with platelets  differential    Narrative    The following orders were created for panel order CBC with platelets differential.  Procedure                               Abnormality         Status                     ---------                               -----------         ------                     CBC with platelets and d...[[id number]]  Abnormal            Final result                 Please view results for these tests on the individual orders.   Comprehensive metabolic panel   Result Value Ref Range    Sodium 140 136 - 145 mmol/L    Potassium 3.8 3.4 - 5.3 mmol/L    Chloride 101 98 - 107 mmol/L    Carbon Dioxide (CO2) 27 22 - 29 mmol/L    Anion Gap 12 7 - 15 mmol/L    Urea Nitrogen 11.7 6.0 - 20.0 mg/dL    Creatinine 1.05 0.67 - 1.17 mg/dL    Calcium 10.4 (H) 8.6 - 10.0 mg/dL    Glucose 94 70 - 99 mg/dL    Alkaline Phosphatase 73 40 - 129 U/L    AST 24 10 - 50 U/L    ALT 20 10 - 50 U/L    Protein Total 8.1 6.4 - 8.3 g/dL    Albumin 5.2 3.5 - 5.2 g/dL    Bilirubin Total 0.6 <=1.2 mg/dL    GFR Estimate >90 >60 mL/min/1.73m2   CBC with platelets and differential   Result Value Ref Range    WBC Count 13.1 (H) 4.0 - 11.0 10e3/uL    RBC Count 5.59 4.40 - 5.90 10e6/uL    Hemoglobin 16.6 13.3 - 17.7 g/dL    Hematocrit 48.4 40.0 - 53.0 %    MCV 87 78 - 100 fL    MCH 29.7 26.5 - 33.0 pg    MCHC 34.3 31.5 - 36.5 g/dL    RDW 12.1 10.0 - 15.0 %    Platelet Count 315 150 - 450 10e3/uL    % Neutrophils 75 %    % Lymphocytes 16 %    % Monocytes 6 %    % Eosinophils 1 %    % Basophils 1 %    % Immature Granulocytes 1 %    NRBCs per 100 WBC 0 <1 /100    Absolute Neutrophils 10.0 (H) 1.6 - 8.3 10e3/uL    Absolute Lymphocytes 2.0 0.8 - 5.3 10e3/uL    Absolute Monocytes 0.8 0.0 - 1.3 10e3/uL    Absolute Eosinophils 0.1 0.0 - 0.7 10e3/uL    Absolute Basophils 0.1 0.0 - 0.2 10e3/uL    Absolute Immature Granulocytes 0.1 <=0.4 10e3/uL    Absolute NRBCs 0.0 10e3/uL   CT Abdomen Pelvis w Contrast    Narrative    Exam:CT ABDOMEN PELVIS W  CONTRAST    History: 22 years Male ?with previous history of kidney stones  presents to the emergency department today with concerns about left  flank pain that is nonradiating is 3 out of 10 is sharp and feels like  previous kidney stones and he also noticed some blood in his urine  yesterday.      Comparisons: 3/22/2022    Technique: Axial CT imaging of the abdomen and pelvis was performed  with intravenous contrast. Coronal and sagittal reconstructions were  obtained.   This exam was performed using one or more of the following dose  reduction techniques:  Automated exposure control, adjustment of the ROXI and/or KV according  to patient's size, and/or use of iterative reconstruction technique.       FINDINGS:  Lung bases:The lung bases are clear    Abdomen:  Liver:Unremarkable  Gallbladder and biliary tree:No calcified gallstones are present.  There is no evidence of biliary dilatation.  Pancreas:Unremarkable  Spleen:Unremarkable  Adrenals:Normal    Kidneys and ureters: Again seen is a horseshoe configuration of the  kidneys. There is left-sided hydronephrosis and hydroureter. In the  distal third of the ureter, a ureteral calculus is present measuring 9  x 6 x 7 mm in diameter.    Lymph nodes:There is no significant lymphadenopathy    Bowel:No abnormally distended or thickened loops of bowel are present.  There is no evidence of bowel obstruction.    Appendix:Unremarkable    Vessels:Unremarkable    Osseous structures:Unremarkable    Pelvis:There is no evidence of mass or lymphadenopathy. No abnormal  fluid collections are present.            Impression    IMPRESSION: There is a sizable obstructing distal left ureteral  calculus, at the S1-S2 level measuring 9 x 6 x 7 mm. There is  left-sided hydronephrosis and hydroureter above the calculus.    Horseshoe configuration of the kidneys. The right kidney is  unremarkable.    ASIA MCCLENDON MD         SYSTEM ID:  [id number]       Medications   cefTRIAXone IN D5W  (ROCEPHIN) intermittent infusion 2 g (has no administration in time range)   azithromycin (ZITHROMAX) 1 g in sodium chloride 0.9 % 500 mL intermittent infusion (has no administration in time range)   acetaminophen (TYLENOL) tablet 975 mg (has no administration in time range)   iopamidol (ISOVUE-370) solution 86 mL (86 mLs Intravenous Given 1/17/23 1418)   sodium chloride (PF) 0.9% PF flush 60 mL (60 mLs Intravenous Given 1/17/23 1418)       Assessments & Plan (with Medical Decision Making)     I have reviewed the nursing notes.    I have reviewed the findings, diagnosis, plan and need for follow up with the patient.      New Prescriptions    No medications on file       Final diagnoses:   Urethritis   Ureterolithiasis       1/17/2023   [facility]     Daniel Cardona MD  01/17/23 5241

## 2023-01-17 NOTE — ED TRIAGE NOTES
Patient presents with c/o hematuria that started once 2 weeks ago and then again today. Patient reports that the first episodes he had urinary retention, bladder pressure, and left sided flank pain that went away after pushing fluids. Patient denies pain at this time.

## 2023-01-18 ENCOUNTER — ANESTHESIA (OUTPATIENT)
Dept: SURGERY | Facility: OTHER | Age: 23
End: 2023-01-18
Payer: COMMERCIAL

## 2023-01-18 ENCOUNTER — ANESTHESIA EVENT (OUTPATIENT)
Dept: SURGERY | Facility: OTHER | Age: 23
End: 2023-01-18
Payer: COMMERCIAL

## 2023-01-18 ENCOUNTER — APPOINTMENT (OUTPATIENT)
Dept: GENERAL RADIOLOGY | Facility: OTHER | Age: 23
End: 2023-01-18
Attending: UROLOGY
Payer: COMMERCIAL

## 2023-01-18 VITALS
SYSTOLIC BLOOD PRESSURE: 131 MMHG | HEART RATE: 82 BPM | DIASTOLIC BLOOD PRESSURE: 82 MMHG | RESPIRATION RATE: 16 BRPM | OXYGEN SATURATION: 98 % | TEMPERATURE: 97 F

## 2023-01-18 LAB
ANION GAP SERPL CALCULATED.3IONS-SCNC: 10 MMOL/L (ref 7–15)
BUN SERPL-MCNC: 14.3 MG/DL (ref 6–20)
CALCIUM SERPL-MCNC: 9 MG/DL (ref 8.6–10)
CHLORIDE SERPL-SCNC: 106 MMOL/L (ref 98–107)
CREAT SERPL-MCNC: 1.04 MG/DL (ref 0.67–1.17)
DEPRECATED HCO3 PLAS-SCNC: 24 MMOL/L (ref 22–29)
ERYTHROCYTE [DISTWIDTH] IN BLOOD BY AUTOMATED COUNT: 12.2 % (ref 10–15)
GFR SERPL CREATININE-BSD FRML MDRD: >90 ML/MIN/1.73M2
GLUCOSE SERPL-MCNC: 91 MG/DL (ref 70–99)
HCT VFR BLD AUTO: 42.7 % (ref 40–53)
HGB BLD-MCNC: 14.1 G/DL (ref 13.3–17.7)
HIV 1+2 AB+HIV1 P24 AG SERPL QL IA: NONREACTIVE
MCH RBC QN AUTO: 28.8 PG (ref 26.5–33)
MCHC RBC AUTO-ENTMCNC: 33 G/DL (ref 31.5–36.5)
MCV RBC AUTO: 87 FL (ref 78–100)
PLATELET # BLD AUTO: 254 10E3/UL (ref 150–450)
POTASSIUM SERPL-SCNC: 3.9 MMOL/L (ref 3.4–5.3)
RBC # BLD AUTO: 4.9 10E6/UL (ref 4.4–5.9)
SODIUM SERPL-SCNC: 140 MMOL/L (ref 136–145)
T PALLIDUM AB SER QL: NONREACTIVE
WBC # BLD AUTO: 8.4 10E3/UL (ref 4–11)

## 2023-01-18 PROCEDURE — 370N000017 HC ANESTHESIA TECHNICAL FEE, PER MIN: Performed by: UROLOGY

## 2023-01-18 PROCEDURE — 99239 HOSP IP/OBS DSCHRG MGMT >30: CPT | Performed by: FAMILY MEDICINE

## 2023-01-18 PROCEDURE — 272N000001 HC OR GENERAL SUPPLY STERILE: Performed by: UROLOGY

## 2023-01-18 PROCEDURE — 710N000010 HC RECOVERY PHASE 1, LEVEL 2, PER MIN: Performed by: UROLOGY

## 2023-01-18 PROCEDURE — C1758 CATHETER, URETERAL: HCPCS | Performed by: UROLOGY

## 2023-01-18 PROCEDURE — 250N000011 HC RX IP 250 OP 636: Performed by: UROLOGY

## 2023-01-18 PROCEDURE — 96374 THER/PROPH/DIAG INJ IV PUSH: CPT | Mod: XU

## 2023-01-18 PROCEDURE — C1769 GUIDE WIRE: HCPCS | Performed by: UROLOGY

## 2023-01-18 PROCEDURE — 360N000083 HC SURGERY LEVEL 3 W/ FLUORO, PER MIN: Performed by: UROLOGY

## 2023-01-18 PROCEDURE — 36415 COLL VENOUS BLD VENIPUNCTURE: CPT | Performed by: FAMILY MEDICINE

## 2023-01-18 PROCEDURE — 250N000009 HC RX 250: Performed by: NURSE ANESTHETIST, CERTIFIED REGISTERED

## 2023-01-18 PROCEDURE — 258N000001 HC RX 258: Performed by: UROLOGY

## 2023-01-18 PROCEDURE — G0378 HOSPITAL OBSERVATION PER HR: HCPCS

## 2023-01-18 PROCEDURE — 272N000002 HC OR SUPPLY OTHER OPNP: Performed by: UROLOGY

## 2023-01-18 PROCEDURE — 258N000003 HC RX IP 258 OP 636: Performed by: FAMILY MEDICINE

## 2023-01-18 PROCEDURE — 250N000025 HC SEVOFLURANE, PER MIN: Performed by: UROLOGY

## 2023-01-18 PROCEDURE — 82947 ASSAY GLUCOSE BLOOD QUANT: CPT | Performed by: FAMILY MEDICINE

## 2023-01-18 PROCEDURE — 250N000011 HC RX IP 250 OP 636: Performed by: FAMILY MEDICINE

## 2023-01-18 PROCEDURE — 99204 OFFICE O/P NEW MOD 45 MIN: CPT | Mod: 25 | Performed by: UROLOGY

## 2023-01-18 PROCEDURE — C1894 INTRO/SHEATH, NON-LASER: HCPCS | Performed by: UROLOGY

## 2023-01-18 PROCEDURE — 250N000026 HC DESFLURANE, PER MIN: Performed by: UROLOGY

## 2023-01-18 PROCEDURE — 258N000003 HC RX IP 258 OP 636: Performed by: INTERNAL MEDICINE

## 2023-01-18 PROCEDURE — C2617 STENT, NON-COR, TEM W/O DEL: HCPCS | Performed by: UROLOGY

## 2023-01-18 PROCEDURE — 52356 CYSTO/URETERO W/LITHOTRIPSY: CPT | Performed by: UROLOGY

## 2023-01-18 PROCEDURE — 52356 CYSTO/URETERO W/LITHOTRIPSY: CPT | Performed by: NURSE ANESTHETIST, CERTIFIED REGISTERED

## 2023-01-18 PROCEDURE — 250N000011 HC RX IP 250 OP 636: Performed by: NURSE ANESTHETIST, CERTIFIED REGISTERED

## 2023-01-18 PROCEDURE — 85027 COMPLETE CBC AUTOMATED: CPT | Performed by: FAMILY MEDICINE

## 2023-01-18 PROCEDURE — 250N000013 HC RX MED GY IP 250 OP 250 PS 637: Performed by: FAMILY MEDICINE

## 2023-01-18 PROCEDURE — 999N000180 XR SURGERY CARM FLUORO LESS THAN 5 MIN: Mod: TC

## 2023-01-18 DEVICE — IMPLANTABLE DEVICE: Type: IMPLANTABLE DEVICE | Site: URETER | Status: FUNCTIONAL

## 2023-01-18 RX ORDER — CEFTRIAXONE SODIUM 1 G/50ML
1 INJECTION, SOLUTION INTRAVENOUS EVERY 24 HOURS
Status: DISCONTINUED | OUTPATIENT
Start: 2023-01-19 | End: 2023-01-18 | Stop reason: HOSPADM

## 2023-01-18 RX ORDER — CEFTRIAXONE SODIUM 2 G/50ML
2 INJECTION, SOLUTION INTRAVENOUS
Status: COMPLETED | OUTPATIENT
Start: 2023-01-18 | End: 2023-01-18

## 2023-01-18 RX ORDER — LIDOCAINE 40 MG/G
CREAM TOPICAL
Status: DISCONTINUED | OUTPATIENT
Start: 2023-01-18 | End: 2023-01-18 | Stop reason: HOSPADM

## 2023-01-18 RX ORDER — HYDROCODONE BITARTRATE AND ACETAMINOPHEN 5; 325 MG/1; MG/1
1-2 TABLET ORAL EVERY 6 HOURS PRN
Qty: 20 TABLET | Refills: 0 | Status: SHIPPED | OUTPATIENT
Start: 2023-01-18 | End: 2023-10-06

## 2023-01-18 RX ORDER — FENTANYL CITRATE 50 UG/ML
50 INJECTION, SOLUTION INTRAMUSCULAR; INTRAVENOUS EVERY 5 MIN PRN
Status: DISCONTINUED | OUTPATIENT
Start: 2023-01-18 | End: 2023-01-18

## 2023-01-18 RX ORDER — FENTANYL CITRATE 50 UG/ML
INJECTION, SOLUTION INTRAMUSCULAR; INTRAVENOUS PRN
Status: DISCONTINUED | OUTPATIENT
Start: 2023-01-18 | End: 2023-01-18

## 2023-01-18 RX ORDER — SODIUM CHLORIDE, SODIUM LACTATE, POTASSIUM CHLORIDE, CALCIUM CHLORIDE 600; 310; 30; 20 MG/100ML; MG/100ML; MG/100ML; MG/100ML
INJECTION, SOLUTION INTRAVENOUS CONTINUOUS
Status: DISCONTINUED | OUTPATIENT
Start: 2023-01-18 | End: 2023-01-18 | Stop reason: HOSPADM

## 2023-01-18 RX ORDER — ONDANSETRON 2 MG/ML
4 INJECTION INTRAMUSCULAR; INTRAVENOUS EVERY 30 MIN PRN
Status: DISCONTINUED | OUTPATIENT
Start: 2023-01-18 | End: 2023-01-18

## 2023-01-18 RX ORDER — ONDANSETRON 4 MG/1
4 TABLET, ORALLY DISINTEGRATING ORAL EVERY 30 MIN PRN
Status: DISCONTINUED | OUTPATIENT
Start: 2023-01-18 | End: 2023-01-18

## 2023-01-18 RX ORDER — DEXAMETHASONE SODIUM PHOSPHATE 4 MG/ML
INJECTION, SOLUTION INTRA-ARTICULAR; INTRALESIONAL; INTRAMUSCULAR; INTRAVENOUS; SOFT TISSUE PRN
Status: DISCONTINUED | OUTPATIENT
Start: 2023-01-18 | End: 2023-01-18

## 2023-01-18 RX ORDER — HYDROMORPHONE HYDROCHLORIDE 1 MG/ML
0.2 INJECTION, SOLUTION INTRAMUSCULAR; INTRAVENOUS; SUBCUTANEOUS EVERY 5 MIN PRN
Status: DISCONTINUED | OUTPATIENT
Start: 2023-01-18 | End: 2023-01-18

## 2023-01-18 RX ORDER — PROPOFOL 10 MG/ML
INJECTION, EMULSION INTRAVENOUS PRN
Status: DISCONTINUED | OUTPATIENT
Start: 2023-01-18 | End: 2023-01-18

## 2023-01-18 RX ORDER — FENTANYL CITRATE 50 UG/ML
25 INJECTION, SOLUTION INTRAMUSCULAR; INTRAVENOUS EVERY 5 MIN PRN
Status: DISCONTINUED | OUTPATIENT
Start: 2023-01-18 | End: 2023-01-18

## 2023-01-18 RX ORDER — KETOROLAC TROMETHAMINE 30 MG/ML
INJECTION, SOLUTION INTRAMUSCULAR; INTRAVENOUS PRN
Status: DISCONTINUED | OUTPATIENT
Start: 2023-01-18 | End: 2023-01-18

## 2023-01-18 RX ORDER — ONDANSETRON 2 MG/ML
INJECTION INTRAMUSCULAR; INTRAVENOUS PRN
Status: DISCONTINUED | OUTPATIENT
Start: 2023-01-18 | End: 2023-01-18

## 2023-01-18 RX ORDER — HYDROMORPHONE HYDROCHLORIDE 1 MG/ML
0.4 INJECTION, SOLUTION INTRAMUSCULAR; INTRAVENOUS; SUBCUTANEOUS EVERY 5 MIN PRN
Status: DISCONTINUED | OUTPATIENT
Start: 2023-01-18 | End: 2023-01-18

## 2023-01-18 RX ORDER — SODIUM CHLORIDE, SODIUM LACTATE, POTASSIUM CHLORIDE, CALCIUM CHLORIDE 600; 310; 30; 20 MG/100ML; MG/100ML; MG/100ML; MG/100ML
INJECTION, SOLUTION INTRAVENOUS CONTINUOUS
Status: DISCONTINUED | OUTPATIENT
Start: 2023-01-18 | End: 2023-01-18

## 2023-01-18 RX ORDER — LIDOCAINE HYDROCHLORIDE 20 MG/ML
INJECTION, SOLUTION INFILTRATION; PERINEURAL PRN
Status: DISCONTINUED | OUTPATIENT
Start: 2023-01-18 | End: 2023-01-18

## 2023-01-18 RX ORDER — IOPAMIDOL 755 MG/ML
INJECTION, SOLUTION INTRAVASCULAR PRN
Status: DISCONTINUED | OUTPATIENT
Start: 2023-01-18 | End: 2023-01-18

## 2023-01-18 RX ADMIN — MIDAZOLAM HYDROCHLORIDE 2 MG: 1 INJECTION, SOLUTION INTRAMUSCULAR; INTRAVENOUS at 12:02

## 2023-01-18 RX ADMIN — ONDANSETRON HYDROCHLORIDE 4 MG: 2 SOLUTION INTRAMUSCULAR; INTRAVENOUS at 12:02

## 2023-01-18 RX ADMIN — OXYCODONE HYDROCHLORIDE 5 MG: 5 TABLET ORAL at 14:30

## 2023-01-18 RX ADMIN — KETOROLAC TROMETHAMINE 30 MG: 30 INJECTION, SOLUTION INTRAMUSCULAR; INTRAVENOUS at 03:56

## 2023-01-18 RX ADMIN — SODIUM CHLORIDE 1000 ML: 9 INJECTION, SOLUTION INTRAVENOUS at 15:26

## 2023-01-18 RX ADMIN — DEXAMETHASONE SODIUM PHOSPHATE 4 MG: 4 INJECTION, SOLUTION INTRA-ARTICULAR; INTRALESIONAL; INTRAMUSCULAR; INTRAVENOUS; SOFT TISSUE at 12:05

## 2023-01-18 RX ADMIN — FENTANYL CITRATE 25 MCG: 50 INJECTION, SOLUTION INTRAMUSCULAR; INTRAVENOUS at 12:23

## 2023-01-18 RX ADMIN — SODIUM CHLORIDE: 9 INJECTION, SOLUTION INTRAVENOUS at 06:19

## 2023-01-18 RX ADMIN — TAMSULOSIN HYDROCHLORIDE 0.4 MG: 0.4 CAPSULE ORAL at 10:55

## 2023-01-18 RX ADMIN — OXYCODONE HYDROCHLORIDE 5 MG: 5 TABLET ORAL at 07:47

## 2023-01-18 RX ADMIN — CEFTRIAXONE SODIUM 2 G: 2 INJECTION, SOLUTION INTRAVENOUS at 11:34

## 2023-01-18 RX ADMIN — OXYCODONE HYDROCHLORIDE 5 MG: 5 TABLET ORAL at 03:33

## 2023-01-18 RX ADMIN — LIDOCAINE HYDROCHLORIDE 40 MG: 20 INJECTION, SOLUTION INFILTRATION; PERINEURAL at 12:05

## 2023-01-18 RX ADMIN — PROPOFOL 200 MG: 10 INJECTION, EMULSION INTRAVENOUS at 12:05

## 2023-01-18 RX ADMIN — ACETAMINOPHEN 975 MG: 325 TABLET ORAL at 16:16

## 2023-01-18 RX ADMIN — KETOROLAC TROMETHAMINE 30 MG: 30 INJECTION, SOLUTION INTRAMUSCULAR at 12:34

## 2023-01-18 RX ADMIN — ACETAMINOPHEN 975 MG: 325 TABLET ORAL at 07:38

## 2023-01-18 RX ADMIN — SODIUM CHLORIDE: 9 INJECTION, SOLUTION INTRAVENOUS at 14:04

## 2023-01-18 RX ADMIN — FENTANYL CITRATE 25 MCG: 50 INJECTION, SOLUTION INTRAMUSCULAR; INTRAVENOUS at 12:02

## 2023-01-18 ASSESSMENT — ACTIVITIES OF DAILY LIVING (ADL)
ADLS_ACUITY_SCORE: 18

## 2023-01-18 ASSESSMENT — LIFESTYLE VARIABLES: TOBACCO_USE: 1

## 2023-01-18 NOTE — PROGRESS NOTES
[facility] [place]    Patient admitted to room 311 at approximately 1900 via wheel chair from direct from [employer]. Patient was accompanied by significant other.     Verbal SBAR report received from Jessa prior to patient arrival.     Patient ambulated to bed independently. Patient alert and oriented X 3. Pain is controlled with current analgesics.  Medication(s) being used: per [employer].  . Admission vital signs: Blood pressure (!) 147/92, pulse 92, temperature 97.8  F (36.6  C), temperature source Tympanic, resp. rate 18, SpO2 97 %. Patient was oriented to plan of care, call light, bed controls, tv, telephone, bathroom, and visiting hours.     Risk Assessment    The following safety risks were identified during admission: none. Yellow risk band applied: NO.     Education    Patient has a Fox to Observation order: No  Observation education completed and documented: N/A      Genevieve Aguiar RN

## 2023-01-18 NOTE — OR NURSING
PACU Transfer Note    Nnamdi Weeks was transferred to [facility] via BED.  Equipment used for transport:  NONE.  Accompanied by:  RN  Prescriptions were: na    PACU Respiratory Event Documentation     1) Episodes of Apnea greater than or equal to 10 seconds: NO    2) Bradypnea - less than 8 breaths per minute: NO    3) Pain score on 0 to 10 scale: 3    4) Pain-sedation mismatch (yes or no): NO    5) Repeated 02 desaturation less than 90% (yes or no): NO    Anesthesia notified? (yes or no): NO    Any of the above events occuring repeatedly in separate 30 minute intervals may be considered recurrent PACU respiratory events.    Patient stable and meets phase 1 discharge criteria for transport from PACU.

## 2023-01-18 NOTE — PROGRESS NOTES
[facility]    Medicine Progress Note - Hospitalist Service    Date of Admission:  1/17/2023    Assessment & Plan      Nnamdi Weeks is a 22 year old male admitted on 1/17/2023. He presented to outside emergency department with hematuria and recurrent left lower quadrant and flank pain.    Patient has a history of kidney stones.  He was noted to have T-max of 100.1 and WBC 13.1 but denied any perceived fever or chills and has been hemodynamically stable.    Urology has been consulted and plans for definitive treatment this morning.  Patient did well overnight and did not develop any fevers.  His mild leukocytosis resolved.  Pain is about the same, slightly improved.    Principal Problem:    Kidney stone    Assessment: 9 x 6 x 7 mm ureteral calculus obstructing left ureter with left-sided hydronephrosis.  Patient likely has cystitis but is currently stable..  He does have positive leukocyte esterase and mild elevation white blood cell count but no evidence of acute sepsis with normal vital signs, normal temperature and other than renal colic no acute issues.    Plan: Patient has been n.p.o. since midnight.  He was given 2 g Rocephin and 1 g azithromycin yesterday afternoon at outside facility.  Plan is for definitive treatment at noon today.  Patient will likely be observed for a couple hours and then discharged by Dr. Ye if he feels this is appropriate.    Active Problems:    Acute cystitis    Assessment: Probable.  Urine culture pending.    Plan: Continue Rocephin.         Diet: NPO per Anesthesia Guidelines for Procedure/Surgery Except for: Meds    DVT Prophylaxis: Pneumatic Compression Devices  Mcadams Catheter: Not present  Lines: None     Cardiac Monitoring: None  Code Status: Full Code      Clinically Significant Risk Factors Present on Admission           # Hypercalcemia: Highest Ca = 10.4 mg/dL in last 2 days, will monitor as appropriate                     Disposition Plan       Expected Discharge Date: 01/19/2023                  Gokul Baptiste MD  Hospitalist Service  [facility]  Securely message with Pager (more info)  Text page via [id number] Paging/Directory   ______________________________________________________________________    Interval History   Patient reports that he feels okay this morning.  Still left-sided abdominal and flank pain but less severe than it was last night.  Did not develop any fevers overnight.  Ate dinner.  No nausea or vomiting.  Has been urinating.  Continues to have hematuria.  It did not produce a stone.    Physical Exam   Vital Signs: Temp: 97  F (36.1  C) Temp src: Tympanic BP: 94/62 Pulse: 65   Resp: 16 SpO2: 98 % O2 Device: None (Room air)    Weight: 0 lbs 0 oz    Constitutional: awake, alert, cooperative, no apparent distress, and appears stated age  Respiratory: No increased work of breathing, good air exchange, clear to auscultation bilaterally, no crackles or wheezing  Cardiovascular: Regularrate and rhythm.  No murmurs rubs or gallops heard.   GI: Abdomen Soft, non-tender.  No masses, rebound or guarding.   Neuropsychiatric: General: normal, calm and normal eye contact  Orientation: oriented to self, place, time and situation  Memory and insight: memory for past and recent events intact and thought process normal    Medical Decision Making       40 MINUTES SPENT BY ME on the date of service doing chart review, history, exam, documentation & further activities per the note.      Data     I have personally reviewed the following data over the past 24 hrs:    8.4  \   14.1   / 254     140 106 14.3 /  91   3.9 24 1.04 \       ALT: 20 AST: 24 AP: 73 TBILI: 0.6   ALB: 5.2 TOT PROTEIN: 8.1 LIPASE: N/A       Imaging results reviewed over the past 24 hrs:   Recent Results (from the past 24 hour(s))   CT Abdomen Pelvis w Contrast    Narrative    Exam:CT ABDOMEN PELVIS W CONTRAST    History: 22 years Male ?with previous history of  kidney stones  presents to the emergency department today with concerns about left  flank pain that is nonradiating is 3 out of 10 is sharp and feels like  previous kidney stones and he also noticed some blood in his urine  yesterday.      Comparisons: 3/22/2022    Technique: Axial CT imaging of the abdomen and pelvis was performed  with intravenous contrast. Coronal and sagittal reconstructions were  obtained.   This exam was performed using one or more of the following dose  reduction techniques:  Automated exposure control, adjustment of the ROXI and/or KV according  to patient's size, and/or use of iterative reconstruction technique.       FINDINGS:  Lung bases:The lung bases are clear    Abdomen:  Liver:Unremarkable  Gallbladder and biliary tree:No calcified gallstones are present.  There is no evidence of biliary dilatation.  Pancreas:Unremarkable  Spleen:Unremarkable  Adrenals:Normal    Kidneys and ureters: Again seen is a horseshoe configuration of the  kidneys. There is left-sided hydronephrosis and hydroureter. In the  distal third of the ureter, a ureteral calculus is present measuring 9  x 6 x 7 mm in diameter.    Lymph nodes:There is no significant lymphadenopathy    Bowel:No abnormally distended or thickened loops of bowel are present.  There is no evidence of bowel obstruction.    Appendix:Unremarkable    Vessels:Unremarkable    Osseous structures:Unremarkable    Pelvis:There is no evidence of mass or lymphadenopathy. No abnormal  fluid collections are present.            Impression    IMPRESSION: There is a sizable obstructing distal left ureteral  calculus, at the S1-S2 level measuring 9 x 6 x 7 mm. There is  left-sided hydronephrosis and hydroureter above the calculus.    Horseshoe configuration of the kidneys. The right kidney is  unremarkable.    ASIA MCCLENDON MD         SYSTEM ID:  [id number]

## 2023-01-18 NOTE — PHARMACY
Pharmacy - Transfer Medication Reconciliation     The patient's transfer medication orders have been compared to the medication administration record and to the Prior to Admissions Medications list - any noted discrepancies were resolved with the MD.     Thank you. Pharmacy will continue to monitor.     Svetlana Villagran [facility] ....................  1/18/2023   1:54 PM

## 2023-01-18 NOTE — PLAN OF CARE
"PRIMARY DIAGNOSIS: \"GENERIC\" NURSING  OUTPATIENT/OBSERVATION GOALS TO BE MET BEFORE DISCHARGE:  ADLs back to baseline: Yes    Activity and level of assistance: Ambulating independently.    Pain status: Improved-controlled with oral pain medications.    Return to near baseline physical activity: Yes     Discharge Planner Nurse   Safe discharge environment identified: Yes  Barriers to discharge: Yes, pt waiting for surgery to get a stent for kidney stone        Entered by: Jessa Prado RN 01/18/2023 10:58 AM     Please review provider order for any additional goals.   Nurse to notify provider when observation goals have been met and patient is ready for discharge.        "

## 2023-01-18 NOTE — UTILIZATION REVIEW
"Admission Status; Secondary Review Determination     Under the authority of the Utilization Management Committee, the utilization review process indicated a secondary review on the above patient.  The review outcome is based on review of the medical records, discussions with staff, and applying clinical experience noted on the date of the review.       (x) Observation Status Appropriate - This patient does not meet hospital inpatient criteria and is placed in observation status. If this patient's primary payer is Medicare and was admitted as an inpatient, Condition Code 44 should be used and patient status changed to \"observation\".     RATIONALE FOR DETERMINATION:  22-year-old male presents to hospital with hematuria and recurrent left lower quadrant/flank pain.  Patient is afebrile with T-max 100.1, initial white blood count 13.1 thousand with normal differential and normal white blood count the following morning imaging does reveal significant obstructing 9 x 6 x 7 mm distal ureteral calculus with some left-sided hydronephrosis.  Patient does have symptomatic obstructing ureteral stone without signs of systemic infection with plans for pain control and definitive surgical intervention with ureteroscopy/laser lithotripsy.  This is appropriate for observation care services.  If patient develops a perioperative significant systemic infection, then at that time patient would be appropriate to advance to inpatient care.      The severity of illness, intensity of service provided, expected LOS and risk for adverse outcome make the care appropriate for further observation; however, doesn't meet criteria for hospital inpatient admission. This was discussed with attending physician who concurred with this determination.    The information on this document is developed by the utilization review team in order for the business office to ensure compliance.  This only denotes the appropriateness of proper admission status and " does not reflect the quality of care rendered.         The definitions of Inpatient Status and Observation Status used in making the determination above are those provided in the CMS Coverage Manual, Chapter 1 and Chapter 6, section 70.4.      Sincerely,     Daren Arroyo MD    Physician Advisor  Utilization Review/ Case Management  [facility].

## 2023-01-18 NOTE — ANESTHESIA PREPROCEDURE EVALUATION
Anesthesia Pre-Procedure Evaluation    Patient: Nnamdi Weeks   MRN: [id number] : 2000        Procedure : Procedure(s):  Left ureteroscopy with laser lithotripsy and stent placement          Past Medical History:   Diagnosis Date     Abdominal pain, unspecified site 2008     ADHD (attention deficit hyperactivity disorder) 2012     Amphetamine user      Anxiety disorder 2015     Asthma,unspecified type, unspecified 2005     Bronchitis, not specified as acute or chronic 2005     Chemical dependency (H) 10/20/2014    abstract=Dr. Jones     Conduct disorder 10/20/2014     Deviated nasal septum      Diarrhea 2005     Disruptive behavior disorder 2015     Hearing deficit, bilateral 2014     Horseshoe kidney 2012     IBS (irritable bowel syndrome)      Intermittent explosive disorder      Lactose intolerance 2012     OCD (obsessive compulsive disorder) 2015     ODD (oppositional defiant disorder) 2015     Other orthopedic aftercare(V54.89) 10/08/2004     Polyphagia(783.6) 2007     PTSD (post-traumatic stress disorder) 2015     Routine infant or child health check 2007     Sensorineural hearing loss 2004      Past Surgical History:   Procedure Laterality Date     ADENOIDECTOMY       TONSILLECTOMY        Allergies   Allergen Reactions     Other [No Clinical Screening - See Comments]      Environmental allergies       Seasonal Allergies      Grass, ragweed, dust      Methylphenidate Rash     At patch site; Daytrana        Social History     Tobacco Use     Smoking status: Every Day     Types: Vaping Device     Smokeless tobacco: Former     Tobacco comments:     no passive exposure   Substance Use Topics     Alcohol use: Yes     Comment: occasional      Wt Readings from Last 1 Encounters:   23 87.8 kg (193 lb 8 oz)        Anesthesia Evaluation   Pt has had prior anesthetic.     No history of anesthetic  complications       ROS/MED HX  ENT/Pulmonary:     (+) tobacco use, Current use, Intermittent, asthma     Neurologic: Comment: Bilateral hearing deficit      Cardiovascular:       METS/Exercise Tolerance: >4 METS    Hematologic:  - neg hematologic  ROS     Musculoskeletal:  - neg musculoskeletal ROS     GI/Hepatic: Comment: polyphagia    (+) Inflammatory bowel disease,     Renal/Genitourinary: Comment: Horseshoe kidney    (+) Nephrolithiasis ,     Endo:  - neg endo ROS     Psychiatric/Substance Use: Comment: Hx explosive personality disorder  ADHD  ADD  ODD  Disruptive behavior disorder  OCD  PTSD    (+) psychiatric history anxiety and bipolar     Infectious Disease:  - neg infectious disease ROS     Malignancy:  - neg malignancy ROS     Other:  - neg other ROS          Physical Exam    Airway        Mallampati: I   TM distance: > 3 FB   Neck ROM: full   Mouth opening: > 3 cm    Respiratory Devices and Support         Dental       (+) Completely normal teeth      Cardiovascular   cardiovascular exam normal       Rhythm and rate: regular and normal     Pulmonary   pulmonary exam normal        breath sounds clear to auscultation           OUTSIDE LABS:  CBC:   Lab Results   Component Value Date    WBC 8.4 01/18/2023    WBC 13.1 (H) 01/17/2023    HGB 14.1 01/18/2023    HGB 16.6 01/17/2023    HCT 42.7 01/18/2023    HCT 48.4 01/17/2023     01/18/2023     01/17/2023     BMP:   Lab Results   Component Value Date     01/18/2023     01/17/2023    POTASSIUM 3.9 01/18/2023    POTASSIUM 3.8 01/17/2023    CHLORIDE 106 01/18/2023    CHLORIDE 101 01/17/2023    CO2 24 01/18/2023    CO2 27 01/17/2023    BUN 14.3 01/18/2023    BUN 11.7 01/17/2023    CR 1.04 01/18/2023    CR 1.05 01/17/2023    GLC 91 01/18/2023    GLC 94 01/17/2023     COAGS: No results found for: PTT, INR, FIBR  POC: No results found for: BGM, HCG, HCGS  HEPATIC:   Lab Results   Component Value Date    ALBUMIN 5.2 01/17/2023    PROTTOTAL 8.1  01/17/2023    ALT 20 01/17/2023    AST 24 01/17/2023    ALKPHOS 73 01/17/2023    BILITOTAL 0.6 01/17/2023     OTHER:   Lab Results   Component Value Date    ABEL 9.0 01/18/2023    CRP <2.9 05/28/2018       Anesthesia Plan    ASA Status:  2   NPO Status:  NPO Appropriate    Anesthesia Type: General.     - Airway: LMA   Induction: Intravenous, Propofol.   Maintenance: Balanced.        Consents    Anesthesia Plan(s) and associated risks, benefits, and realistic alternatives discussed. Questions answered and patient/representative(s) expressed understanding.     - Discussed: Risks, Benefits and Alternatives for BOTH SEDATION and the PROCEDURE were discussed     - Discussed with:  Patient      - Extended Intubation/Ventilatory Support Discussed: No.      - Patient is DNR/DNI Status: No    Use of blood products discussed: No .     Postoperative Care    Pain management: IV analgesics.   PONV prophylaxis: Ondansetron (or other 5HT-3)     Comments:                JALYN Alex CRNA

## 2023-01-18 NOTE — OP NOTE
Preoperative diagnosis  Left ureteral stone    Postoperative diagnosis  Left ureteral stone    Procedure performed  Left ureteroscopy with holmium-YAG laser lithotripsy and basket extraction of stone fragments  Cystoscopy with left retrograde pyelogram and ureteral stent placement  Interpretation of retrograde    Surgeon and Assistants (if any)  Surgeon(s):  Oli Ye MD  Circulator: Marielle Dixon RN  Scrub Person: Florina Mars  First Assistant: Yomaira Lama RN    Specimen(s)  Yes  Stone fragments for biochemical analysis    (EBL) Estimated blood loss (ml)  5    Anesthesia  General    Complications  None    Findings  Cystoscopy revealed no tumors, stones or other mucosal abnormalities.  Retrograde pyelogram revealed a  mildly dilated system with identification of the stone seen on imaging.    Indications  22 year old male agreed to undergo the above named procedure after discussion of the alternatives, risks and benefits.    Informed consent was obtained.      Procedure  The patient was taken to the operating room and placed supine on the operating table.  Pre-operative antibiotics were administered.  Bilateral lower extremity SCDs were placed.  After induction of general anesthesia the patient was positioned in dorsal lithotomy, prepped and draped in a sterile fashion.  A time-out was performed.      I passed a 14 Russian flexible cystoscope carefully via urethra into the bladder.  The left ureteral orifice was identified and a Sensor wire was passed retrograde to the level of the kidney and confirmed by fluoroscopy.  The flexible scope was off-loaded and the bladder emptied with a straight catheter.  An 8-10 coaxial dilator was passed without difficulty and then removed.  The MR6A semirigid ureteroscope was passed carefully along the Sensor wire through the urethra and into the distal ureter.  The stone was encountered and fragmented with a thulium laser fiber.  The fragments were basket  extracted.  At the completion of the procedure, all clinically significant fragments were removed from the ureter and only dust-like fragments remained.  The ureteroscope was withdrawn.  A 5-Congolese open-ended was passed over the wire and the wire removed.  A retrograde pyelogram was performed by slowly injecting 5 mL of 50% Omnipaque contrast via the 5 Congolese catheter with findings described above.  An Amplatz super-stiff was placed to the level of the renal pelvis confirmed by fluoroscopy.  A 6 x 30 Congolese stent was positioned with the upper end in the upper pole and the lower in the bladder confirmed by fluoroscopy. The bladder was emptied and the procedure was complete. The patient tolerated the procedure well and was stable throughout.    Plan  Follow up in clinic for stent pull in the next week or so.

## 2023-01-18 NOTE — PROGRESS NOTES
Pt up to toilet to try and void and is tearful and states that the pain  Is worse than his kidney stone that he had.  Call to Dr. Baptiste and he advised to call Dr. Page.  Provider updated, states that this is normal and that pt should keep the IV fluids going and keep trying to void.    Pt administered PRN oxycodone.

## 2023-01-18 NOTE — ANESTHESIA POSTPROCEDURE EVALUATION
Patient: Nnamdi Weeks    Procedure: Procedure(s):  Left ureteroscopy with laser lithotripsy and stent placement       Anesthesia Type:  General    Note:  Disposition: Outpatient   Postop Pain Control: Uneventful            Sign Out: Well controlled pain   PONV: No   Neuro/Psych: Uneventful            Sign Out: Acceptable/Baseline neuro status   Airway/Respiratory: Uneventful            Sign Out: Acceptable/Baseline resp. status   CV/Hemodynamics: Uneventful            Sign Out: Acceptable CV status; No obvious hypovolemia; No obvious fluid overload   Other NRE: NONE   DID A NON-ROUTINE EVENT OCCUR? No           Last vitals:  Vitals Value Taken Time   /66 01/18/23 1315   Temp 96.3  F (35.7  C) 01/18/23 1315   Pulse 55 01/18/23 1315   Resp 14 01/18/23 1315   SpO2 99 % 01/18/23 1315       Electronically Signed By: JALYN Alex CRNA  January 18, 2023  2:53 PM

## 2023-01-18 NOTE — PROVIDER NOTIFICATION
01/17/23 2007   Valuables   Patient Belongings remains with patient   Patient Belongings Remaining with Patient clothing;cell phone/electronics;earrings;purse/wallet;other (see comments)   Did you bring any home meds/supplements to the hospital?  No   A               Admission:  I am responsible for any personal items that are not sent to the safe or pharmacy.  [facility] is not responsible for loss, theft or damage of any property in my possession.    Signature:  _________________________________ Date: _______  Time: _____                                              Staff Signature:  ____________________________ Date: ________  Time: _____      2nd Staff person, if patient is unable/unwilling to sign:    Signature: ________________________________ Date: ________  Time: _____     Discharge:  [facility] has returned all of my personal belongings:    Signature: _________________________________ Date: ________  Time: _____                                          Staff Signature:  ____________________________ Date: ________  Time: _____

## 2023-01-18 NOTE — PROGRESS NOTES
[facility] DISCHARGE NOTE    Patient discharged to home at 5:20 PM via wheel chair. Accompanied by other:patients girlfriend and staff. Discharge instructions reviewed with patient, opportunity offered to ask questions. Prescriptions sent to patients preferred pharmacy. All belongings sent with patient.    Jessa Prado RN

## 2023-01-18 NOTE — CONSULTS
I was asked to see this patient by Dr Baptiste and provide my opinion about the following:  Left ureteral stone    Type of Visit  Consult    Chief Complaint  Ureteral stone    HPI  Mr. Weeks is a 22 year old male who presents with left  ureteral stone  The patient initially presented to the ED yesterday.  At that time the patient underwent a CT scan which revealed a 9 mm obstructing stone.  The patient currently denies fevers or chills.  The patient currently denies nausea or vomiting.  The patient has not undergone surgery in the past for stones however he has successfully passed smaller stones in the past.    The patient has a T-max of 100.1 with a white count of 13.1.  He also has pyuria.  Because of these complicating factors the patient was admitted.    Pain ROS  Location:  Left flank  Quality:    Sharp  Provocative factors:  Nothing makes it worse  Palliative factors:   Narcotics make it better  Radiation:   None  Severity:   4/10 currently and 10/10 at its worst  Time:     Pain started 3 day(s) ago      Past Medical History  He  has a past medical history of Abdominal pain, unspecified site (11/14/2008), ADHD (attention deficit hyperactivity disorder) (12/13/2012), Amphetamine user, Anxiety disorder (03/19/2015), Asthma,unspecified type, unspecified (05/25/2005), Bronchitis, not specified as acute or chronic (03/24/2005), Chemical dependency (H) (10/20/2014), Conduct disorder (10/20/2014), Deviated nasal septum, Diarrhea (09/27/2005), Disruptive behavior disorder (03/19/2015), Hearing deficit, bilateral (09/04/2014), Horseshoe kidney (12/13/2012), IBS (irritable bowel syndrome), Intermittent explosive disorder, Lactose intolerance (12/13/2012), OCD (obsessive compulsive disorder) (03/09/2015), ODD (oppositional defiant disorder) (03/09/2015), Other orthopedic aftercare(V54.89) (10/08/2004), Polyphagia(783.6) (12/07/2007), PTSD (post-traumatic stress disorder) (03/19/2015), Routine infant or child health check  (09/12/2007), and Sensorineural hearing loss (11/03/2004).  Patient Active Problem List   Diagnosis     Bipolar I disorder (H)     Attention deficit hyperactivity disorder (ADHD), predominantly inattentive type     Kidney stone     Acute cystitis       Past Surgical History  He  has a past surgical history that includes Tonsillectomy and Adenoidectomy.    Medications  He [unfilled]    Allergies  Allergies   Allergen Reactions     Other [No Clinical Screening - See Comments]      Environmental allergies       Seasonal Allergies      Grass, ragweed, dust      Methylphenidate Rash     At patch site; Daytrana         Social History  He  reports that he has been smoking vaping device. He has quit using smokeless tobacco. He reports current alcohol use. He reports current drug use. Drug: Marijuana.  No drug abuse.    Family History  Family History   Problem Relation Age of Onset     Allergies Sister      Allergies Mother      Asthma Sister      Heart Disease Maternal Grandmother         disease     Schizophrenia Brother        Review of Systems  I personally reviewed the ROS with the patient.    There are no exam notes on file for this visit.    Physical Exam  Vitals:    01/17/23 1904 01/17/23 1955 01/18/23 0345 01/18/23 0740   BP: (!) 132/90 (!) 147/92 97/62 94/62   BP Location:  Left arm Left arm Left arm   Patient Position:  Supine Supine Supine   Pulse: 111 92 69 65   Resp: 18 18 18 16   Temp: 98.3  F (36.8  C) 97.8  F (36.6  C) 97.1  F (36.2  C) 97  F (36.1  C)   TempSrc: Tympanic Tympanic Tympanic Tympanic   SpO2:  97% 96% 98%     Constitutional: No acute distress.  Alert and cooperative   Head: NCAT  Eyes: Conjunctivae normal  Cardiovascular: Regular rate.  Pulmonary/Chest: Respirations are even and non-labored bilaterally, no audible wheezing  Abdominal: Soft. No distension, tenderness, masses or guarding.   Neurological: A + O x 3.  Cranial Nerves II-XII grossly intact.  Extremities: DAVIN x 4, Warm. No clubbing.  No  cyanosis.    Skin: Pink, warm and dry.  No visible rashes noted.  Psychiatric:  Normal mood and affect  Back:  + left CVA tenderness.  - right CVA tenderness.  Genitourinary: nonpalpable bladder    Labs  Results for orders placed or performed during the hospital encounter of 01/17/23   Basic metabolic panel     Status: Normal   Result Value Ref Range    Sodium 140 136 - 145 mmol/L    Potassium 3.9 3.4 - 5.3 mmol/L    Chloride 106 98 - 107 mmol/L    Carbon Dioxide (CO2) 24 22 - 29 mmol/L    Anion Gap 10 7 - 15 mmol/L    Urea Nitrogen 14.3 6.0 - 20.0 mg/dL    Creatinine 1.04 0.67 - 1.17 mg/dL    Calcium 9.0 8.6 - 10.0 mg/dL    Glucose 91 70 - 99 mg/dL    GFR Estimate >90 >60 mL/min/1.73m2   CBC with platelets     Status: Normal   Result Value Ref Range    WBC Count 8.4 4.0 - 11.0 10e3/uL    RBC Count 4.90 4.40 - 5.90 10e6/uL    Hemoglobin 14.1 13.3 - 17.7 g/dL    Hematocrit 42.7 40.0 - 53.0 %    MCV 87 78 - 100 fL    MCH 28.8 26.5 - 33.0 pg    MCHC 33.0 31.5 - 36.5 g/dL    RDW 12.2 10.0 - 15.0 %    Platelet Count 254 150 - 450 10e3/uL       Imaging  CT stone study   1/17/2022  I personally reviewed and interpreted the images and report.  IMPRESSION: There is a sizable obstructing distal left ureteral  calculus, at the S1-S2 level measuring 9 x 6 x 7 mm. There is  left-sided hydronephrosis and hydroureter above the calculus.     Horseshoe configuration of the kidneys. The right kidney is  unremarkable.    Assessment  Mr. Weeks is a 22 year old male who presents with symptomatic left  ureteral stone.    The patient has a T-max of 100.1 with a white count of 13.1.  He also has pyuria.  Because of these complicating factors the patient was admitted.  Given he has no clinical indicators of systemic infection we will go ahead and treat the distal stone to shorten the patient's indwelling stent time by few weeks.  Any intraoperative indicators suggesting his infection is worse than presentation I will abort stone treatment  "and place a stent only.    Discussed the treatment options for the ureteral stone including trial of passage and ureteroscopy with laser lithotripsy.  The stone is too large to pass and there are clinical indicators suggesting he may have UTI.    After explaining the risks, benefits, and alternatives a decision was made to proceed with ureteroscopy/laser lithotripsy.    The patient was explained the specific risks of bleeding, pain, infection, and ureteral injury.    In addition, the patient was told a stent may need to be placed which could result in urinary frequency, urgency, dysuria, and flank pain with voiding.    It would require an office based procedure to remove it at a later date.    Finally, the patient was told if the stone was very impacted, that we would place a stent and return at a later date to treat the stone.      Plan  The patient was scheduled and consented for \"left ureteroscopy with holmium laser lithotripsy and stent placement\" in the OR (LMA general anesthesia).  "

## 2023-01-18 NOTE — PHARMACY-ADMISSION MEDICATION HISTORY
"Pharmacy -- Admission Medication Reconciliation    Prior to admission (PTA) medications were reviewed and the patient's PTA medication list was updated.    Sources Consulted: Patient interviewJuanjo    The reliability of this Medication Reconciliation is: Reliability: Borderline reliable    The following significant changes were made:  -Updated patient's preferred pharmacy ([facility] [place])    -Removed Abilify  -Removed Flonase  -Removed Albuterol MDI    In addition, the patient's allergies were reviewed with the patient and left as follows:   Allergies: Other [no clinical screening - see comments], Seasonal allergies, and Methylphenidate    The pharmacist has reviewed with the patient that all personal medications should be removed from the building or locked in the belongings safe.  Patient shall only take medications ordered by the physician and administered by the nursing staff.       Medication barriers identified: unclear- see adherence note below   Medication adherence concerns: pt reports he has supply of Divalproex at home, but has not taken since he picked up \"a couple weeks ago;\" patient somnolent during interview and it is unclear why he has not been taking.   Understanding of emergency medications: N/A    Nirmal Clark [facility], 1/18/2023,  9:06 AM     "

## 2023-01-18 NOTE — PROGRESS NOTES
hospitalist updated on pt bladder scan of 286.  Provider wants pt to try and void again.  Would like pt have less than 150ml in his bladder.  Pt upset and wants to go home.  Pt is willing to try and void this time but if he is not able to void then he wants to leave.

## 2023-01-18 NOTE — DISCHARGE INSTRUCTIONS
[facility]  Adult Discharge Orders & Instructions      For 24 hours after surgery:  Get plenty of rest.  A responsible adult must stay with you for at least 24 hours after you leave the hospital.   You may feel lightheaded.  IF so, sit for a few minutes before standing.  Have someone help you get up.   You may have a slight fever. Call the doctor if your fever is over 101 F (38.3 C) (taken under the tongue) or lasts longer than 24 hours.  You may have a dry mouth, a sore throat, muscle aches or trouble sleeping.  These should go away after 24 hours.  Do not make important or legal decisions.  6.   Do not drive or use heavy equipment.  If you have weakness or tingling, don't drive or use heavy equipment until this feeling goes away.                                                                                                                                                                         To contact a doctor, call    [phone]______________

## 2023-01-18 NOTE — ANESTHESIA CARE TRANSFER NOTE
Patient: Nnamdi Weeks    Procedure: Procedure(s):  Left ureteroscopy with laser lithotripsy and stent placement       Diagnosis: Left ureteral stone [N20.1]  Diagnosis Additional Information: No value filed.    Anesthesia Type:   General     Note:    Oropharynx: oropharynx clear of all foreign objects  Level of Consciousness: awake  Oxygen Supplementation: room air    Independent Airway: airway patency satisfactory and stable  Dentition: dentition unchanged  Vital Signs Stable: post-procedure vital signs reviewed and stable  Report to RN Given: handoff report given  Patient transferred to: PACU    Handoff Report: Identifed the Patient, Identified the Reponsible Provider, Reviewed the pertinent medical history, Discussed the surgical course, Reviewed Intra-OP anesthesia mangement and issues during anesthesia, Set expectations for post-procedure period and Allowed opportunity for questions and acknowledgement of understanding      Vitals:  Vitals Value Taken Time   BP     Temp     Pulse     Resp     SpO2         Electronically Signed By: JALYN Gill CRNA  January 18, 2023  12:40 PM

## 2023-01-18 NOTE — PLAN OF CARE
"Patient admitted 1/17/23 with kidney stone. VSS. C/o of flank pain 7/10, oxy given at 2302 and 0333 and Toradol at 0356 with some relief. Has been NPO since midnight for treatment in morning. Will continue to monitor.     Goal Outcome Evaluation:      Plan of Care Reviewed With: patient        Problem: Plan of Care - These are the overarching goals to be used throughout the patient stay.    Goal: Plan of Care Review  Description: The Plan of Care Review/Shift note should be completed every shift.  The Outcome Evaluation is a brief statement about your assessment that the patient is improving, declining, or no change.  This information will be displayed automatically on your shift note.  Outcome: Not Progressing  Flowsheets (Taken 1/18/2023 0437)  Plan of Care Reviewed With: patient  Goal: Patient-Specific Goal (Individualized)  Description: You can add care plan individualizations to a care plan. Examples of Individualization might be:  \"Parent requests to be called daily at 9am for status\", \"I have a hard time hearing out of my right ear\", or \"Do not touch me to wake me up as it startles me\".  Outcome: Not Progressing  Goal: Absence of Hospital-Acquired Illness or Injury  Outcome: Not Progressing  Intervention: Identify and Manage Fall Risk  Recent Flowsheet Documentation  Taken 1/18/2023 0345 by Genevieve Aguiar, RN  Safety Promotion/Fall Prevention:    assistive device/personal items within reach    clutter free environment maintained    patient and family education    nonskid shoes/slippers when out of bed    room organization consistent    safety round/check completed    treat underlying cause    treat reversible contributory factors    lighting adjusted  Taken 1/17/2023 1955 by Genevieve Aguiar, RN  Safety Promotion/Fall Prevention:    assistive device/personal items within reach    clutter free environment maintained    fall prevention program maintained    nonskid shoes/slippers when out of bed    safety " round/check completed  Intervention: Prevent Skin Injury  Recent Flowsheet Documentation  Taken 1/18/2023 0345 by Genevieve Aguiar RN  Body Position:    position changed independently    supine, head elevated  Taken 1/17/2023 1955 by Genevieve Aguiar RN  Body Position:    left    side-lying  Intervention: Prevent and Manage VTE (Venous Thromboembolism) Risk  Recent Flowsheet Documentation  Taken 1/18/2023 0345 by Genevieve Aguiar RN  VTE Prevention/Management: SCDs (sequential compression devices) on  Taken 1/17/2023 1955 by Genevieve Aguiar RN  VTE Prevention/Management: SCDs (sequential compression devices) on  Goal: Optimal Comfort and Wellbeing  Outcome: Not Progressing  Intervention: Monitor Pain and Promote Comfort  Recent Flowsheet Documentation  Taken 1/18/2023 0345 by Genevieve Aguiar RN  Pain Management Interventions: medication (see MAR)  Goal: Readiness for Transition of Care  Outcome: Not Progressing  Intervention: Mutually Develop Transition Plan  Recent Flowsheet Documentation  Taken 1/17/2023 2009 by Genevieve Aguiar RN  Equipment Currently Used at Home: none     Problem: Pain Acute  Goal: Optimal Pain Control and Function  Outcome: Not Progressing  Intervention: Develop Pain Management Plan  Recent Flowsheet Documentation  Taken 1/18/2023 0345 by Genevieve Aguiar RN  Pain Management Interventions: medication (see MAR)  Intervention: Prevent or Manage Pain  Recent Flowsheet Documentation  Taken 1/18/2023 0345 by Genevieve Aguiar RN  Medication Review/Management: medications reviewed  Taken 1/17/2023 1955 by Genevieve Aguiar RN  Medication Review/Management: medications reviewed

## 2023-01-18 NOTE — PLAN OF CARE
"PRIMARY DIAGNOSIS: \"GENERIC\" NURSING  OUTPATIENT/OBSERVATION GOALS TO BE MET BEFORE DISCHARGE:  ADLs back to baseline: Yes    Activity and level of assistance: Ambulating independently.    Pain status: Improved-controlled with oral pain medications.    Return to near baseline physical activity: Yes     Discharge Planner Nurse   Safe discharge environment identified: Yes  Barriers to discharge: Yes, pt waiting for surgery for stent for kidney stone        Entered by: Jessa Prado RN 01/18/2023 10:57 AM     Please review provider order for any additional goals.   Nurse to notify provider when observation goals have been met and patient is ready for discharge.  "

## 2023-01-18 NOTE — H&P
[facility]    History and Physical - Hospitalist Service       Date of Admission:  1/17/2023    Assessment & Plan      Nnamdi Weeks is a 22 year old male admitted on 1/17/2023. He presented to outside emergency department with hematuria and recurrent left lower quadrant and flank pain.    Patient has a history of kidney stones.  He was noted to have T-max of 100.1 and WBC 13.1 but denies any perceived fever or chills and has been hemodynamically stable.    Urology has been consulted and plans on seeing patient in the morning for treatment.  He will be made n.p.o. tonight.  If patient were to develop fever or any hemodynamic instability I would suggest urgent intervention.    Principal Problem:    Kidney stone    Assessment: 9 x 6 x 7 mm ureteral calculus obstructing left ureter with left-sided hydronephrosis.  Patient likely has cystitis but is currently stable..  He does have positive leukocyte esterase and mild elevation white blood cell count but no evidence of acute sepsis with normal vital signs, normal temperature and other than renal colic no acute issues.    Plan: N.p.o. at midnight.  Has already been given Rocephin 2 g and azithromycin 1 g at outside facility.  Urine is being cultured.  Blood cultures collected.  Will monitor vital signs for any signs or symptoms of infection.  Urology plans on treatment in the morning.  Appreciate expertise.    Active Problems:    Acute cystitis    Assessment: Probable.  Urine culture pending.    Plan: Continue Rocephin.       Diet: Combination Diet Regular Diet Adult  NPO per Anesthesia Guidelines for Procedure/Surgery Except for: Meds    DVT Prophylaxis: Pneumatic Compression Devices  Mcadams Catheter: Not present  Lines: None     Cardiac Monitoring: None  Code Status: Full Code      Clinically Significant Risk Factors Present on Admission           # Hypercalcemia: Highest Ca = 10.4 mg/dL in last 2 days, will monitor as appropriate                      Disposition Plan      Expected Discharge Date: 01/19/2023                  Gokul Baptiste MD  Hospitalist Service  [facility]  Securely message with [employer] (more info)  Text page via [facility] Paging/Directory     ______________________________________________________________________    Chief Complaint   Left sided abdominal pain and left flank pain.  Hematuria.    History is obtained from the patient    History of Present Illness   Nnamdi Weeks is a 22 year old male who presented to outside emergency department with recurrent left lower quadrant pain.  Patient reports symptoms first began 10 or 11 days ago when he was in the [facility].  He reports he laid in bed for 5 days because he did not want to get up and then started to have lower left quadrant pain and kelli hematuria.  Symptoms resolved but then recurred a few days ago.  He went to the emergency department but left without being seen.    This morning patient had recurrent hematuria and started having pain in his left lower abdomen and flank.  He reports the pain was mild to moderate.  Sharp in quality and colicky in nature.    He reports past history of kidney stones and has had issues with kidney infections as well.  He has a left horseshoe kidney.    Patient denies any fevers or chills.  He has not had any dizziness, confusion, cough or shortness of breath, chest pain.    He has had some loose stools and note reviewed told emergency room doctor he had blood in his stool this morning but he denies that to me now.      Past Medical History    Past Medical History:   Diagnosis Date     Abdominal pain, unspecified site 11/14/2008     ADHD (attention deficit hyperactivity disorder) 12/13/2012     Amphetamine user      Anxiety disorder 03/19/2015     Asthma,unspecified type, unspecified 05/25/2005     Bronchitis, not specified as acute or chronic 03/24/2005     Chemical dependency (H) 10/20/2014    abstract=Dr. Jones      Conduct disorder 10/20/2014     Deviated nasal septum      Diarrhea 09/27/2005     Disruptive behavior disorder 03/19/2015     Hearing deficit, bilateral 09/04/2014     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/13/2012     IBS (irritable bowel syndrome)      Intermittent explosive disorder      Lactose intolerance 12/13/2012     OCD (obsessive compulsive disorder) 03/09/2015     ODD (oppositional defiant disorder) 03/09/2015     Other orthopedic aftercare(V54.89) 10/08/2004     Polyphagia(783.6) 12/07/2007     PTSD (post-traumatic stress disorder) 03/19/2015     Routine infant or child health check 09/12/2007     Sensorineural hearing loss 11/03/2004       Past Surgical History   Past Surgical History:   Procedure Laterality Date     ADENOIDECTOMY       TONSILLECTOMY         Prior to Admission Medications   Prior to Admission Medications   Prescriptions Last Dose Informant Patient Reported? Taking?   ARIPiprazole (ABILIFY) 5 MG tablet   Yes No   Sig: Take 5 mg by mouth daily   albuterol (PROAIR HFA/PROVENTIL HFA/VENTOLIN HFA) 108 (90 Base) MCG/ACT Inhaler   Yes No   Sig: Inhale 2 puffs into the lungs   Patient not taking: Reported on 1/17/2023   divalproex sodium extended-release (DEPAKOTE ER) 500 MG 24 hr tablet   Yes No   Sig: Take 500 mg by mouth At Bedtime   fluticasone (FLONASE) 50 MCG/ACT spray   Yes No   Sig: Spray 2 sprays in nostril   Patient not taking: Reported on 1/17/2023      Facility-Administered Medications: None        Review of Systems    CONSTITUTIONAL: NEGATIVE for fever, chills, change in weight  INTEGUMENTARY/SKIN: NEGATIVE for worrisome rashes, moles or lesions  EYES: NEGATIVE for vision changes or irritation  ENT/MOUTH: NEGATIVE for ear, mouth and throat problems  RESP: NEGATIVE for significant cough or SOB  CV: NEGATIVE for chest pain, palpitations or peripheral edema  GI: NEGATIVE for nausea, abdominal pain, heartburn, or change in bowel habits  : NEGATIVE for frequency, dysuria, or hematuria  MUSCULOSKELETAL:  NEGATIVE for significant arthralgias or myalgia  NEURO: NEGATIVE for weakness, dizziness or paresthesias  ENDOCRINE: NEGATIVE for temperature intolerance, skin/hair changes  HEME: NEGATIVE for bleeding problems  PSYCHIATRIC: NEGATIVE for changes in mood or affect    Social History   I have reviewed this patient's social history and updated it with pertinent information if needed.  Social History     Tobacco Use     Smoking status: Every Day     Types: Vaping Device     Smokeless tobacco: Former     Tobacco comments:     no passive exposure   Substance Use Topics     Alcohol use: Yes     Comment: occasional     Drug use: Yes     Types: Marijuana        Physical Exam   Vital Signs: Temp: 97.8  F (36.6  C) Temp src: Tympanic BP: (!) 147/92 Pulse: 92   Resp: 18 SpO2: 97 % O2 Device: None (Room air)    Weight: 0 lbs 0 oz    Constitutional: awake, alert, cooperative, no apparent distress, and appears stated age  Eyes: Lids and lashes normal, pupils equal, round and reactive to light, extra ocular muscles intact, sclera clear, conjunctiva normal  ENT: Normocephalic, without obvious abnormality, atraumatic, sinuses nontender on palpation, external ears without lesions, oral pharynx with moist mucous membranes, tonsils without erythema or exudates, gums normal and poor dentition.  Hematologic / Lymphatic: No lymphadenopathy  Respiratory: Clear to auscultation bilaterally  Cardiovascular: Regular rate and rhythm.  No murmurs rubs or gallops.  GI: Abdomen soft, nondistended.  Tender with palpation left lower quadrant and suprapubic region.  Bowel sounds heard in all quadrants  Genitounirinary:Left CVA does have some tenderness to percussion.  No tenderness on the right side at all.  Skin: No rashes or evidence of infection  Musculoskeletal: No synovitis.  Muscle strength age and body habitus appropriateas well as equal and even.   Neurologic: No neurologic deficits  Neuropsychiatric: Alert and oriented x3    Medical Decision  Making       55 MINUTES SPENT BY ME on the date of service doing chart review, history, exam, documentation & further activities per the note.      Data     I have personally reviewed the following data over the past 24 hrs:    13.1 (H)  \   16.6   / 315     140 101 11.7 /  94   3.8 27 1.05 \       ALT: 20 AST: 24 AP: 73 TBILI: 0.6   ALB: 5.2 TOT PROTEIN: 8.1 LIPASE: N/A       Imaging results reviewed over the past 24 hrs:   Recent Results (from the past 24 hour(s))   CT Abdomen Pelvis w Contrast    Narrative    Exam:CT ABDOMEN PELVIS W CONTRAST    History: 22 years Male ?with previous history of kidney stones  presents to the emergency department today with concerns about left  flank pain that is nonradiating is 3 out of 10 is sharp and feels like  previous kidney stones and he also noticed some blood in his urine  yesterday.      Comparisons: 3/22/2022    Technique: Axial CT imaging of the abdomen and pelvis was performed  with intravenous contrast. Coronal and sagittal reconstructions were  obtained.   This exam was performed using one or more of the following dose  reduction techniques:  Automated exposure control, adjustment of the ROXI and/or KV according  to patient's size, and/or use of iterative reconstruction technique.       FINDINGS:  Lung bases:The lung bases are clear    Abdomen:  Liver:Unremarkable  Gallbladder and biliary tree:No calcified gallstones are present.  There is no evidence of biliary dilatation.  Pancreas:Unremarkable  Spleen:Unremarkable  Adrenals:Normal    Kidneys and ureters: Again seen is a horseshoe configuration of the  kidneys. There is left-sided hydronephrosis and hydroureter. In the  distal third of the ureter, a ureteral calculus is present measuring 9  x 6 x 7 mm in diameter.    Lymph nodes:There is no significant lymphadenopathy    Bowel:No abnormally distended or thickened loops of bowel are present.  There is no evidence of bowel  obstruction.    Appendix:Unremarkable    Vessels:Unremarkable    Osseous structures:Unremarkable    Pelvis:There is no evidence of mass or lymphadenopathy. No abnormal  fluid collections are present.            Impression    IMPRESSION: There is a sizable obstructing distal left ureteral  calculus, at the S1-S2 level measuring 9 x 6 x 7 mm. There is  left-sided hydronephrosis and hydroureter above the calculus.    Horseshoe configuration of the kidneys. The right kidney is  unremarkable.    ASIA MCCLENDON MD         SYSTEM ID:  [id number]

## 2023-01-19 ENCOUNTER — PATIENT OUTREACH (OUTPATIENT)
Dept: FAMILY MEDICINE | Facility: OTHER | Age: 23
End: 2023-01-19
Payer: COMMERCIAL

## 2023-01-19 ENCOUNTER — TELEPHONE (OUTPATIENT)
Dept: UROLOGY | Facility: OTHER | Age: 23
End: 2023-01-19
Payer: COMMERCIAL

## 2023-01-19 LAB — BACTERIA UR CULT: NO GROWTH

## 2023-01-19 NOTE — DISCHARGE SUMMARY
[facility]  Hospitalist Discharge Summary      Date of Admission:  1/17/2023  Date of Discharge:  1/18/2023  5:20 PM  Discharging Provider: Gokul Baptiste MD  Discharge Service: Hospitalist Service    Discharge Diagnoses   Left ureteral lithiasis    Follow-ups Needed After Discharge   Follow-up in clinic with Dr. Ye in 1 week    Unresulted Labs Ordered in the Past 30 Days of this Admission     Date and Time Order Name Status Description    1/17/2023  3:39 PM Blood Culture Peripheral Blood Preliminary     1/17/2023  3:39 PM Blood Culture Peripheral Blood Preliminary       These results will be followed up by ordering or follow-up physician    Discharge Disposition   Discharged to home  Condition at discharge: Good      Hospital Course      Nnamdi Weeks is a 22 year old male admitted on 1/17/2023. He presented to outside emergency department with hematuria and recurrent left lower quadrant and flank pain.    Patient has a history of kidney stones.  He was noted to have T-max of 100.1 and WBC 13.1 but denied any perceived fever or chills and has been hemodynamically stable.    Urology was consulted and took the patient to the operating room for left ureteroscopy with holmium YAG laser lithotripsy and basket extraction of stone fragments.  Patient underwent cystoscopy with left retrograde pyelogram and ureteral stent placement.  Plan is to remove the stent in 1 week.      He did have some increased discomfort after the procedure but this improved and he was discharged home on small quantity of hydrocodone for pain control.    Patient did receive 2 g of Rocephin at outside facility..  He did not develop any fevers or have any other signs or symptoms concerning for acute infection.  His gonorrhea and Chlamydia testing was negative as was trichomoniasis.  Urine culture showed no growth.  Mild leukocytosis on admit had normalized by the morning of discharge.          Consultations This  Hospital Stay   None    Code Status   Prior    Time Spent on this Encounter   I, Gokul Baptiste MD, personally saw the patient today and spent greater than 30 minutes discharging this patient.       Gokul Baptiste MD  [facility] \[facility]\""  [address]  [place] [address]  Phone: [phone]  Fax: [phone]  ______________________________________________________________________    Physical Exam   Vital Signs: Temp: 97  F (36.1  C) Temp src: Tympanic BP: 131/82 Pulse: 82   Resp: 16 SpO2: 98 % O2 Device: None (Room air)    Weight: 0 lbs 0 oz    Patient was not reexamined by me at the time of discharge.       Primary Care Physician   Trang Merida    Discharge Orders      Discharge Instructions    Stent pull in 1 week as scheduled       Significant Results and Procedures   Most Recent 3 CBC's:Recent Labs   Lab Test 01/18/23  0604 01/17/23  1315 03/21/22  2338   WBC 8.4 13.1* 11.2*   HGB 14.1 16.6 16.0   MCV 87 87 86    315 368     Most Recent 3 BMP's:Recent Labs   Lab Test 01/18/23  0604 01/17/23  1315 03/21/22  2338    140 138   POTASSIUM 3.9 3.8 3.8   CHLORIDE 106 101 105   CO2 24 27 27   BUN 14.3 11.7 12   CR 1.04 1.05 1.18   ANIONGAP 10 12 6   ABEL 9.0 10.4* 9.4   GLC 91 94 102*   ,   Results for orders placed or performed during the hospital encounter of 01/17/23   XR Surgery MECCA L/T 5 Min Fluoro    Narrative    This exam was marked as non-reportable because it will not be read by a   radiologist or a [place] non-radiologist provider.               Discharge Medications   Discharge Medication List as of 1/18/2023  5:10 PM      START taking these medications    Details   HYDROcodone-acetaminophen (NORCO) 5-325 MG tablet Take 1-2 tablets by mouth every 6 hours as needed for severe pain (7-10), Disp-20 tablet, R-0, E-Prescribe         CONTINUE these medications which have NOT CHANGED    Details   divalproex sodium extended-release (DEPAKOTE ER) 500 MG 24 hr tablet  Take 500 mg by mouth At Bedtime, Historical           Allergies   Allergies   Allergen Reactions     Other [No Clinical Screening - See Comments]      Environmental allergies       Seasonal Allergies      Grass, ragweed, dust      Methylphenidate Rash     At patch site; Daytrana

## 2023-01-19 NOTE — TELEPHONE ENCOUNTER
After proper verification, patient stated that he had blood in his urine after his Left ureteroscopy with laser lithotripsy and stent placement yesterday.  Patient was informed that it was very normal and to increase fluid intake and to call back if he had any other questions or concerns.  Marli Bean LPN on 1/19/2023 at 3:17 PM

## 2023-01-19 NOTE — TELEPHONE ENCOUNTER
Patient has PCP elsewhere, no follow-up here. No TCM call required per policy.    Corinne R Thayer, RN on 1/19/2023 at 8:36 AM

## 2023-01-23 LAB
BACTERIA BLD CULT: NO GROWTH
BACTERIA BLD CULT: NO GROWTH

## 2023-01-25 ENCOUNTER — OFFICE VISIT (OUTPATIENT)
Dept: UROLOGY | Facility: OTHER | Age: 23
End: 2023-01-25
Attending: UROLOGY
Payer: COMMERCIAL

## 2023-01-25 VITALS
BODY MASS INDEX: 25.42 KG/M2 | HEART RATE: 92 BPM | OXYGEN SATURATION: 99 % | WEIGHT: 198 LBS | RESPIRATION RATE: 16 BRPM

## 2023-01-25 DIAGNOSIS — N20.1 LEFT URETERAL STONE: Primary | ICD-10-CM

## 2023-01-25 PROCEDURE — 52310 CYSTOSCOPY AND TREATMENT: CPT | Performed by: UROLOGY

## 2023-01-25 PROCEDURE — 99212 OFFICE O/P EST SF 10 MIN: CPT | Mod: 25 | Performed by: UROLOGY

## 2023-01-25 PROCEDURE — G0463 HOSPITAL OUTPT CLINIC VISIT: HCPCS | Mod: 25

## 2023-01-25 RX ORDER — CIPROFLOXACIN 500 MG/1
500 TABLET, FILM COATED ORAL EVERY 12 HOURS SCHEDULED
Status: ACTIVE | OUTPATIENT
Start: 2023-01-25 | End: 2023-01-25

## 2023-01-25 ASSESSMENT — PAIN SCALES - GENERAL: PAINLEVEL: NO PAIN (0)

## 2023-01-25 NOTE — NURSING NOTE
Patient positioned in supine position, perineum area prepped with chlorhexidene Gluconate and patient draped per sterile technique. Per verbal order read back by Oli Ye MD, Urojet 10mL 2% lidocaine jelly to be instilled into urethra.  Urojet- 10ml 2% Lidocaine jelly instilled into the urethra.    Urojet 2%  Lot#: [id number]  Expiration date: 5/2024  : [employer]  NDC: [address]-5    [place] Protocol    A. Pre-procedure verification complete Yes  1-relevant information / documentation available, reviewed and properly matched to the patient; 2-consent accurate and complete, 3-equipment and supplies available    B. Site marking complete N/A  Site marked if not in continuous attendance with patient    C. TIME OUT completed Yes  Time Out was conducted just prior to starting procedure to verify the eight required elements: 1-patient identity, 2-consent accurate and complete, 3-position, 4-correct side/site marked (if applicable), 5-procedure, 6-relevant images / results properly labeled and displayed (if applicable), 7-antibiotics / irrigation fluids (if applicable), 8-safety precautions.    After procedure perineum area rinsed. Discharge instructions reviewed with patient. Patient verbalized understanding of discharge instructions and discharged ambulatory.  Katie Lawler LPN..................1/25/2023  11:30 AM

## 2023-01-25 NOTE — PROGRESS NOTES
Preoperative diagnosis  Nephrolithiasis    Postoperative diagnosis  Nephrolithiasis    Procedure  Flexible cystourethroscopy with stent removal    Surgeon  Oli Ye MD    Anesthesia  2% lidocaine jelly intraurethrally    Complications  None    Indications  22 year old male who is status post ureteroscopy with holmium laser lithotripsy who presents for stent removal.    Procedure  The patient was given one dose of antibiotics. The patient was placed in supine position and was prepped and draped in sterile fashion.  2% lidocaine jelly was bluntly injected per urethra without difficulty. I passed the 14 [place] flexible cystoscope through the urethra and into the bladder.  With the aid of a stent grasper I grasped and removed the stent in its entirety.  The patient tolerated the procedure well.    Plan  Discussed generic dietary approach to stone prevention- provided hand out  Patient would like to collect Litholink and follow-up after the results are back to evaluate modifiable risk factors leading to frequent stone formation        I spent 10 minutes on this patient's visit (exclusive of separately billed services/procedures) and over half of this time was spent in face-to-face counseling regarding stone prevention:  Prevention strategies with fluids and diet, rationale for a metabolic work up, prognosis and importance of compliance.

## 2023-01-25 NOTE — PATIENT INSTRUCTIONS
Please follow the below generic recommendations to help prevent stones.    If you are interested in undergoing a work up (including blood and urine tests) to determine your patient specific factors for why you form stones and ways to specifically prevent stones in the future, ask Dr Ye if he hasn't already discussed this with you.      Fluids (Increase)  Please increase your fluid intake   Recommend about ten 10-ounce glasses of fluid per day (avoid dark wayne).  Please try and keep your urine clear or pale yellow.    If it is dark yellow or cloudy it is concentrated and you need to drink more fluid.  Try drinking a tall glass of water prior to every snack/meal.    Citrate (Increase)  Citrate prevents stone formation and is naturally found in urine.    It can be increased by adding concentrated lemon juice (4 ounces daily) and/or drinking diluted orange juice (50/50 with water).    Both MinuteMaid Light and Crystal Light also contain citrate and can be helpful for stone prevention.    If you plan to drink sodas, I would recommend Diet/Sunkist and/or Diet/7UP as they contain the most citrate (which is good) compared to the wayne such as Coke/Pepsi.      Salt (Decrease)  Try to limit salt intake.  Total daily sodium intake should be less than 1500mg.  If you use salt with cooking don't add any additional salt at the table.    Protein  Limit protein intake to small to moderate portions.  For instance, a steak should be no larger than about the size of a deck of cards.  High protein intake leads to stone formation.    Home Care after Cystoscopy  Follow these guidelines for your care after your procedure.    Activity  No limitations    Bathing or showering  No limitations    Symptoms  You may notice some burning with urination but this usually resolves after 1-2 days.  You may also notice small amounts of blood in your urine.  Please increase water intake for the next few days to help with these  symptoms.    Contacts  General Questions: [phone]  Appointments:  [phone]  Emergencies:  [phone]    When to call the clinic  If you develop any of the following symptoms please call the clinic immediately.  If the clinic is closed please be seen at an urgent care clinic or the Emergency Department.  - Burning with urination that worsens after 2 days  - Unable to urinate causing severe pelvic pain  - Fevers of greater than 101 degrees F  - Flank pain that is not responding to pain medication    Follow up  Please follow up as discussed at the appointment.

## 2023-02-14 ENCOUNTER — APPOINTMENT (OUTPATIENT)
Dept: GENERAL RADIOLOGY | Facility: HOSPITAL | Age: 23
End: 2023-02-14
Attending: PHYSICIAN ASSISTANT
Payer: COMMERCIAL

## 2023-02-14 ENCOUNTER — HOSPITAL ENCOUNTER (EMERGENCY)
Facility: HOSPITAL | Age: 23
Discharge: HOME | End: 2023-02-14
Attending: PHYSICIAN ASSISTANT | Admitting: PHYSICIAN ASSISTANT
Payer: COMMERCIAL

## 2023-02-14 VITALS
OXYGEN SATURATION: 97 % | SYSTOLIC BLOOD PRESSURE: 116 MMHG | TEMPERATURE: 97.9 F | RESPIRATION RATE: 16 BRPM | HEART RATE: 109 BPM | DIASTOLIC BLOOD PRESSURE: 66 MMHG

## 2023-02-14 DIAGNOSIS — S60.221A CONTUSION OF RIGHT HAND, INITIAL ENCOUNTER: ICD-10-CM

## 2023-02-14 DIAGNOSIS — S62.337D CLOSED DISPLACED FRACTURE OF NECK OF FIFTH METACARPAL BONE OF LEFT HAND WITH ROUTINE HEALING, SUBSEQUENT ENCOUNTER: ICD-10-CM

## 2023-02-14 PROCEDURE — 99213 OFFICE O/P EST LOW 20 MIN: CPT | Performed by: PHYSICIAN ASSISTANT

## 2023-02-14 PROCEDURE — G0463 HOSPITAL OUTPT CLINIC VISIT: HCPCS

## 2023-02-14 PROCEDURE — 73130 X-RAY EXAM OF HAND: CPT | Mod: RT

## 2023-02-14 NOTE — ED TRIAGE NOTES
Patient reports spanking dog about a week ago and left hand pain hasn't improved, broke hand about 2 months ago and feels like he may have rebroke left hand.

## 2023-02-14 NOTE — ED TRIAGE NOTES
Pt presents with c/o hand pain  States that he spanked his dog about a week ago and now his hand pain isn't improving. Also states that if feels like he has a sack of fluid on the outter palm of his left hand.  He also states that since breaking his lef hand he has been using his right hand to punch things and thinks he broke his knuckle on his right hand, 4th digit.   has been taking tyl and icing.

## 2023-02-14 NOTE — DISCHARGE INSTRUCTIONS
Ice, elevation, and ibuprofen as directed for pain.   Return here with any new or worsening symptoms.

## 2023-02-14 NOTE — ED PROVIDER NOTES
"  History     Chief Complaint   Patient presents with     Hand Injury     HPI  Nnamdi Weeks is a 22 year old male with h/o left 5th metacarpal fracture 2 months ago who presents with worsening pain to the left hand since \"spanking\" his dog last week. He also is concerned about his 4th knuckle of his right hand as he recently punched a wall and has had pain to that area since.     Allergies:  Allergies   Allergen Reactions     Other [No Clinical Screening - See Comments]      Environmental allergies       Seasonal Allergies      Grass, ragweed, dust      Methylphenidate Rash and Hives     At patch site; Daytrana    Rash at patch site         Problem List:    Patient Active Problem List    Diagnosis Date Noted     Kidney stone 01/17/2023     Priority: Medium     Acute cystitis 01/17/2023     Priority: Medium     Bipolar I disorder (H) 04/07/2016     Priority: Medium     Attention deficit hyperactivity disorder (ADHD), predominantly inattentive type 04/07/2016     Priority: Medium        Past Medical History:    Past Medical History:   Diagnosis Date     Abdominal pain, unspecified site 11/14/2008     ADHD (attention deficit hyperactivity disorder) 12/13/2012     Amphetamine user      Anxiety disorder 03/19/2015     Asthma,unspecified type, unspecified 05/25/2005     Bronchitis, not specified as acute or chronic 03/24/2005     Chemical dependency (H) 10/20/2014     Conduct disorder 10/20/2014     Deviated nasal septum      Diarrhea 09/27/2005     Disruptive behavior disorder 03/19/2015     Hearing deficit, bilateral 09/04/2014     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/13/2012     IBS (irritable bowel syndrome)      Intermittent explosive disorder      Lactose intolerance 12/13/2012     OCD (obsessive compulsive disorder) 03/09/2015     ODD (oppositional defiant disorder) 03/09/2015     Other orthopedic aftercare(V54.89) 10/08/2004     Polyphagia(783.6) 12/07/2007     PTSD (post-traumatic stress disorder) 03/19/2015     Routine " infant or child health check 09/12/2007     Sensorineural hearing loss 11/03/2004       Past Surgical History:    Past Surgical History:   Procedure Laterality Date     ADENOIDECTOMY       COMBINED CYSTOSCOPY, URETEROSCOPY, LASER HOLMIUM LITHOTRIPSY URETER(S) Left 1/18/2023    Procedure: Left ureteroscopy with laser lithotripsy and stent placement;  Surgeon: Oli Ye MD;  Location: [facility] OR     TONSILLECTOMY         Family History:    Family History   Problem Relation Age of Onset     Allergies Sister      Allergies Mother      Asthma Sister      Heart Disease Maternal Grandmother         disease     Schizophrenia Brother        Social History:  Marital Status:  Single [1]  Social History     Tobacco Use     Smoking status: Every Day     Types: Vaping Device     Smokeless tobacco: Former     Tobacco comments:     no passive exposure   Substance Use Topics     Alcohol use: Yes     Comment: occasional     Drug use: Yes     Types: Marijuana        Medications:    divalproex sodium extended-release (DEPAKOTE ER) 500 MG 24 hr tablet  HYDROcodone-acetaminophen (NORCO) 5-325 MG tablet          Review of Systems   All other systems reviewed and are negative.      Physical Exam   BP: 116/66  Pulse: 109  Temp: 97.9  F (36.6  C)  Resp: 16  SpO2: 97 %      Physical Exam  Vitals and nursing note reviewed.   Constitutional:       Appearance: Normal appearance.   Cardiovascular:      Rate and Rhythm: Normal rate.      Pulses: Normal pulses.   Pulmonary:      Effort: Pulmonary effort is normal.   Musculoskeletal:         General: Normal range of motion.      Right wrist: Normal.      Left wrist: Normal.      Right hand: Tenderness (Tenderness to 4th MCP joint. No swelling. ) present. No swelling, deformity or lacerations. Normal range of motion. Normal strength. Normal sensation. There is no disruption of two-point discrimination. Normal capillary refill. Normal pulse.      Left hand: Swelling (Mild swelling to prior fracture  site, 5th metacarpal. ) and tenderness present. No deformity or lacerations. Normal range of motion. Normal strength. Normal sensation. There is no disruption of two-point discrimination. Normal capillary refill. Normal pulse.   Skin:     General: Skin is warm.      Capillary Refill: Capillary refill takes less than 2 seconds.   Neurological:      Mental Status: He is alert.         ED Course                 Procedures           Results for orders placed or performed during the hospital encounter of 02/14/23 (from the past 24 hour(s))   XR Hand Right G/E 3 Views    Narrative    PROCEDURE:  XR HAND LEFT G/E 3 VIEWS, XR HAND RIGHT G/E 3 VIEWS    HISTORY: broke 2 months ago, 5th metacarple, pain still persistent.    COMPARISON:  None.    TECHNIQUE:  3 views each hand.    FINDINGS:  A prior proximal left fifth metacarpal fracture is seen,  associated with solid healing bone. No evidence of recurrent fracture  is seen. Slight, less than 10% foreshortening of the left fifth  metacarpal is seen as compared to the right.    No acute fracture is identified in either hand. No significant  osteoarthritis is seen. No retained dense foreign body is identified.    RAEGAN ROCK MD         SYSTEM ID:  [id number]   XR Hand Left G/E 3 Views    Narrative    PROCEDURE:  XR HAND LEFT G/E 3 VIEWS, XR HAND RIGHT G/E 3 VIEWS    HISTORY: broke 2 months ago, 5th metacarple, pain still persistent.    COMPARISON:  None.    TECHNIQUE:  3 views each hand.    FINDINGS:  A prior proximal left fifth metacarpal fracture is seen,  associated with solid healing bone. No evidence of recurrent fracture  is seen. Slight, less than 10% foreshortening of the left fifth  metacarpal is seen as compared to the right.    No acute fracture is identified in either hand. No significant  osteoarthritis is seen. No retained dense foreign body is identified.    RAEGAN ROCK MD         SYSTEM ID:  [id number]       Medications - No data to  display    Assessments & Plan (with Medical Decision Making)   No acute fracture on bilateral hand Xrays today. Prior left 5th metacarpal fracture is healing well. Supportive care was recommended.     I have reviewed the nursing notes.    I have reviewed the findings, diagnosis, plan and need for follow up with the patient.    New Prescriptions    No medications on file       Final diagnoses:   Closed displaced fracture of neck of fifth metacarpal bone of left hand with routine healing, subsequent encounter   Contusion of right hand, initial encounter       2/14/2023   [facility]

## 2023-03-08 ENCOUNTER — APPOINTMENT (OUTPATIENT)
Dept: GENERAL RADIOLOGY | Facility: HOSPITAL | Age: 23
End: 2023-03-08
Attending: NURSE PRACTITIONER
Payer: COMMERCIAL

## 2023-03-08 ENCOUNTER — HOSPITAL ENCOUNTER (EMERGENCY)
Facility: HOSPITAL | Age: 23
Discharge: HOME | End: 2023-03-08
Attending: NURSE PRACTITIONER | Admitting: NURSE PRACTITIONER
Payer: COMMERCIAL

## 2023-03-08 VITALS
TEMPERATURE: 99.2 F | OXYGEN SATURATION: 95 % | RESPIRATION RATE: 18 BRPM | SYSTOLIC BLOOD PRESSURE: 133 MMHG | HEART RATE: 87 BPM | DIASTOLIC BLOOD PRESSURE: 84 MMHG

## 2023-03-08 DIAGNOSIS — S93.401A RIGHT ANKLE SPRAIN: Primary | ICD-10-CM

## 2023-03-08 PROCEDURE — G0463 HOSPITAL OUTPT CLINIC VISIT: HCPCS

## 2023-03-08 PROCEDURE — 73630 X-RAY EXAM OF FOOT: CPT | Mod: RT

## 2023-03-08 PROCEDURE — 99213 OFFICE O/P EST LOW 20 MIN: CPT | Performed by: NURSE PRACTITIONER

## 2023-03-08 ASSESSMENT — ENCOUNTER SYMPTOMS
MYALGIAS: 1
JOINT SWELLING: 1

## 2023-03-09 NOTE — DISCHARGE INSTRUCTIONS
Use the ankle brace.  Rest and continue elevating and applying ice packs as needed.    Tylenol as needed for pain.    Follow-up with your doctor if no improvements.    Return to urgent care or emergency room if any worsening or concerning symptoms.

## 2023-03-09 NOTE — ED TRIAGE NOTES
pt presents to urgent care with c/o right ankle pain with swelling due to an injury at the [employer]. Happened a week ago. Pt has taken tylenol and has elevated ankle. Pt can not take ibuprofen.

## 2023-03-09 NOTE — ED PROVIDER NOTES
History     Chief Complaint   Patient presents with     Ankle Pain     HPI  Nnamdi Weeks is a 22 year old male who presents ambulatory to urgent care for evaluation of right ankle pain.  Patient tells me that about 1.5 weeks ago he was at the [employer] park with his children when he landed on his right foot wrong.  He has had pain to his right ankle ever since.  Patient kept thinking that it would get better but notes now he has increased swelling to his ankle.  He is still ambulating.  He tells me that pain is much better when he is walking on it breath when resting.  Taking Tylenol as needed for the pain.  No history of fractures to this ankle or foot.  No paresthesias.    Allergies:  Allergies   Allergen Reactions     Other [No Clinical Screening - See Comments]      Environmental allergies       Seasonal Allergies      Grass, ragweed, dust      Methylphenidate Rash and Hives     At patch site; Daytrana    Rash at patch site         Problem List:    Patient Active Problem List    Diagnosis Date Noted     Kidney stone 01/17/2023     Priority: Medium     Acute cystitis 01/17/2023     Priority: Medium     Bipolar I disorder (H) 04/07/2016     Priority: Medium     Attention deficit hyperactivity disorder (ADHD), predominantly inattentive type 04/07/2016     Priority: Medium        Past Medical History:    Past Medical History:   Diagnosis Date     Abdominal pain, unspecified site 11/14/2008     ADHD (attention deficit hyperactivity disorder) 12/13/2012     Amphetamine user      Anxiety disorder 03/19/2015     Asthma,unspecified type, unspecified 05/25/2005     Bronchitis, not specified as acute or chronic 03/24/2005     Chemical dependency (H) 10/20/2014     Conduct disorder 10/20/2014     Deviated nasal septum      Diarrhea 09/27/2005     Disruptive behavior disorder 03/19/2015     Hearing deficit, bilateral 09/04/2014     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/13/2012     IBS (irritable bowel syndrome)      Intermittent  explosive disorder      Lactose intolerance 12/13/2012     OCD (obsessive compulsive disorder) 03/09/2015     ODD (oppositional defiant disorder) 03/09/2015     Other orthopedic aftercare(V54.89) 10/08/2004     Polyphagia(783.6) 12/07/2007     PTSD (post-traumatic stress disorder) 03/19/2015     Routine infant or child health check 09/12/2007     Sensorineural hearing loss 11/03/2004       Past Surgical History:    Past Surgical History:   Procedure Laterality Date     ADENOIDECTOMY       COMBINED CYSTOSCOPY, URETEROSCOPY, LASER HOLMIUM LITHOTRIPSY URETER(S) Left 1/18/2023    Procedure: Left ureteroscopy with laser lithotripsy and stent placement;  Surgeon: Oli Ye MD;  Location: [facility]     TONSILLECTOMY         Family History:    Family History   Problem Relation Age of Onset     Allergies Sister      Allergies Mother      Asthma Sister      Heart Disease Maternal Grandmother         disease     Schizophrenia Brother        Social History:  Marital Status:  Single [1]  Social History     Tobacco Use     Smoking status: Every Day     Types: Vaping Device     Smokeless tobacco: Former     Tobacco comments:     no passive exposure   Substance Use Topics     Alcohol use: Yes     Comment: occasional     Drug use: Yes     Types: Marijuana        Medications:    divalproex sodium extended-release (DEPAKOTE ER) 500 MG 24 hr tablet  HYDROcodone-acetaminophen (NORCO) 5-325 MG tablet          Review of Systems   Musculoskeletal: Positive for joint swelling and myalgias.   All other systems reviewed and are negative.      Physical Exam   BP: 133/84  Pulse: 87  Temp: 99.2  F (37.3  C)  Resp: 18  SpO2: 95 %      Physical Exam  Vitals and nursing note reviewed.   Constitutional:       General: He is not in acute distress.     Appearance: He is well-developed. He is not diaphoretic.   HENT:      Head: Normocephalic and atraumatic.   Eyes:      Pupils: Pupils are equal, round, and reactive to light.   Cardiovascular:       Rate and Rhythm: Normal rate.   Pulmonary:      Effort: Pulmonary effort is normal.   Musculoskeletal:      Cervical back: Normal range of motion and neck supple.      Right lower leg: No edema.      Left lower leg: No edema.      Right ankle: Swelling present. Tenderness present over the lateral malleolus, medial malleolus, ATF ligament, AITF ligament, CF ligament and posterior TF ligament. No base of 5th metatarsal tenderness. Decreased range of motion. Normal pulse.      Right Achilles Tendon: Normal. No tenderness or defects.   Skin:     General: Skin is warm and dry.      Coloration: Skin is not pale.      Findings: No bruising.   Neurological:      Mental Status: He is alert and oriented to person, place, and time.         ED Course                 Procedures                Results for orders placed or performed during the hospital encounter of 03/08/23 (from the past 24 hour(s))   Foot  XR, G/E 3 views, right    Narrative    PROCEDURE:  XR FOOT RIGHT G/E 3 VIEWS    HISTORY: generalized pain and swelling around the outside and inside  of the ankle and [facility]    COMPARISON:  None.    TECHNIQUE:  3 views of the right foot were obtained.    FINDINGS:  No fracture or dislocation is identified. The joint spaces  are preserved.        Impression    IMPRESSION: No acute fracture.      SARAH COCHRAN MD         SYSTEM ID:  [id number]       Medications - No data to display    Assessments & Plan (with Medical Decision Making)     I have reviewed the nursing notes.    This is a 22-year-old male that presented for evaluation of right ankle pain and swelling that started 1.5 weeks ago following an injury.  CMS intact.  No obvious deformity.  X-ray of his right ankle is negative for acute findings.  Symptoms consistent with an ankle sprain.  Right ankle brace applied today.  Advised him to continue with Tylenol as needed for the pain.  Recommended rest, applying ice and elevating affected  extremity.  Follow-up with primary doctor if no improvement in symptoms.  Return to urgent care or emergency department for any worsening or concerning symptoms.    I have reviewed the findings, diagnosis, plan and need for follow up with the patient.  This document was prepared using a combination of typing and voice generated software.  While every attempt was made for accuracy, spelling and grammatical errors may exist.    Medical Decision Making  The patient's presentation was of low complexity (an acute and uncomplicated illness or injury).    The patient's evaluation involved:  ordering and/or review of 1 test(s) in this encounter (see separate area of note for details)    The patient's management necessitated only low risk treatment.        New Prescriptions    No medications on file       Final diagnoses:   Right ankle sprain       3/8/2023   [facility]     Mpofu, Kyungncmallory, CNP  03/08/23 2044

## 2023-10-05 NOTE — PROGRESS NOTES
Assessment & Plan     Opioid use disorder  [place] PDMP reviewed and appropriate    - Basic metabolic panel  - CBC with platelets  - Hepatic function panel  - Hepatitis B Surface Antibody  - Hepatitis B surface antigen  - Hepatitis C Screen Reflex to HCV RNA Quant and Genotype  - HIV Antigen Antibody Combo Cascade  - Urine Drugs of Abuse Screen Buprenorphine Urine Qualitative [id number], Ethanol Urine Qualitative [id number], Oxycodone Urine Qualitative [id number], Methadone Urine Qualitative [id number], Creatinine Urine Random [id number]; No  - buprenorphine HCl-naloxone HCl (SUBOXONE) 2-0.5 MG per film; Place 1 Film under the tongue 2 times daily    Bipolar I disorder (H) / Dysthymia  Follows with [facility]  - start on depakote    Horseshoe kidney  Cr/gfr wnl      See Patient Instructions    Next visit 10/10/2023    Althea Felix MD  [facility] - ALEXANDER Coto is a 23 year old, presenting for the following health issues:  Recheck Medication      HPI       Current Narcotic Use/History:  Which opioid(s) are you currently using, that are not already on your med list?: None - was using Dilaudid - up to 30mg at a time.   Oxycodone, Oxycontin, M30s  How do you use your drug of choice? Snort   What is your estimated total dose (mg if pills, grams of heroin) per day? Up to 30mg of Dilaudid  When did you last use? 9 days ago  Have you ever tried to quit on your own? YES-   What have you done to try quiting in the past? Tried cold turkey - was working   What was the longest period of time you have been sober from opioids?: 1 week  When and how did you start using opioids? 5 years ago - used recreationally - then broke his hand, ankle, kidney stones, etc - was prescribed.    Have you every tried MOUD? Has tried Suboxone off the street - a few times   Meth use - started at 11.  Has been off for over 1 year  Has never overdosed     Other Substance/Psychiatric History:  Have you been struggling with  any other mental health symptoms?: Anxiety, Depression, Bipolar 1 Disorder  Any other drug use other than opioids?: Cannabis  Do you struggle with any other addictive behaviors (sex, gambling, internet, shopping, TV etc): None  Are you sexually active?: yes, single partner, contraception -   Has a baby on the way  -Due October 30    Social History  Housing status: with mother of baby on the way in [place]  Employment status: Unemployed, not seeking work  Relationship status: Partnered  Children: 2  children - with one one the way  Legal: No   Insurance needs: MA lapsed - will have SW assist him with this.  Contact information up to date? Yes    Sees Arely Gannon through [facility] - mental health med management  Just got out of [facility]ox in [place] a few days ago      Family History:  Does anyone in your family have a history of a use disorder? YES- Mother when she was younger- meth,  brother  - meth     Opioid Use Disorder Criteria:       10/6/2023     9:33 AM   OUD Results   Opioids are used more/longer than intended Yes   Tried to cut down or control opioid use more than once without success Yes   Significant time spent finding, using, or recovering from opioids Yes   Cravings for opioids Yes   Opioid use leading to major consequences or significant difficulty performing at work, school, or home Yes   Opioid use creating or worsening problems with social interractions or relationships Yes   Activities in personal or work life are decreased or stopped due to opioid use Yes   Opioid use in hazardous situations No   Ongoing opioid use despite problems with physical or mental health that are related to opioid use Yes   Tolerance - requiring more to get the same effect, or using the same amount and feeling less of a response Yes   Withdrawal - experiencing symptoms consistent with opioid withdrawal Yes   Opioid Use Disorder Total Score 10     PHQ Score:      4/7/2016    10:08 AM   PHQ   PHQ-9 Total Score 10    Q9: Thoughts of better off dead/self-harm past 2 weeks Not at all     GAD7 Score:        No data to display                  PDMP Review       None            Review of Systems   Constitutional:  Negative for chills and fever.   HENT:  Negative for congestion.    Respiratory:  Negative for shortness of breath and wheezing.    Cardiovascular:  Negative for chest pain and palpitations.   Gastrointestinal:  Negative for abdominal pain.          Objective    /62   Pulse 71   Temp 97.7  F (36.5  C) (Tympanic)   Resp 18   Wt 86 kg (189 lb 8 oz)   SpO2 97%   BMI 24.33 kg/m    Body mass index is 24.33 kg/m .  Physical Exam  Constitutional:       General: He is not in acute distress.     Appearance: He is not ill-appearing.   Cardiovascular:      Rate and Rhythm: Normal rate and regular rhythm.      Pulses: Normal pulses.      Heart sounds: No murmur heard.  Pulmonary:      Effort: Pulmonary effort is normal. No respiratory distress.      Breath sounds: No wheezing or rales.   Neurological:      Mental Status: He is alert.          Results for orders placed or performed in visit on 10/06/23 (from the past 24 hour(s))   Basic metabolic panel   Result Value Ref Range    Sodium 142 135 - 145 mmol/L    Potassium 4.6 3.4 - 5.3 mmol/L    Chloride 105 98 - 107 mmol/L    Carbon Dioxide (CO2) 29 22 - 29 mmol/L    Anion Gap 8 7 - 15 mmol/L    Urea Nitrogen 9.3 6.0 - 20.0 mg/dL    Creatinine 1.11 0.67 - 1.17 mg/dL    GFR Estimate >90 >60 mL/min/1.73m2    Calcium 9.8 8.6 - 10.0 mg/dL    Glucose 92 70 - 99 mg/dL   CBC with platelets   Result Value Ref Range    WBC Count 8.9 4.0 - 11.0 10e3/uL    RBC Count 5.34 4.40 - 5.90 10e6/uL    Hemoglobin 15.8 13.3 - 17.7 g/dL    Hematocrit 46.9 40.0 - 53.0 %    MCV 88 78 - 100 fL    MCH 29.6 26.5 - 33.0 pg    MCHC 33.7 31.5 - 36.5 g/dL    RDW 12.1 10.0 - 15.0 %    Platelet Count 305 150 - 450 10e3/uL   Hepatic function panel   Result Value Ref Range    Protein Total 7.3 6.4 - 8.3 g/dL     Albumin 4.9 3.5 - 5.2 g/dL    Bilirubin Total 0.4 <=1.2 mg/dL    Alkaline Phosphatase 73 40 - 129 U/L    AST 21 0 - 45 U/L    ALT 13 0 - 70 U/L    Bilirubin Direct <0.20 0.00 - 0.30 mg/dL   Urine Drugs of Abuse Screen Buprenorphine Urine Qualitative [id number], Ethanol Urine Qualitative BYY307, Oxycodone Urine Qualitative [id number], Methadone Urine Qualitative [id number], Creatinine Urine Random [id number]; No    Narrative    The following orders were created for panel order Urine Drugs of Abuse Screen Buprenorphine Urine Qualitative [id number], Ethanol Urine Qualitative HSM743, Oxycodone Urine Qualitative [id number], Methadone Urine Qualitative [id number], Creatinine Urine Random [id number]; No.  Procedure                               Abnormality         Status                     ---------                               -----------         ------                     Drug Abuse Screen Qual U...[[id number]]  Abnormal            Final result               Buprenorphine Urine, Rogelio...[[id number]]  Normal              Final result               Ethanol urine[[id number]]                Normal              Final result               Methadone Qual Urine[[id number]]         Normal              Final result               Oxycodone Urine, Qualita...[[id number]]  Normal              Final result               Creatinine random urine[[id number]]                          Final result                 Please view results for these tests on the individual orders.   Drug Abuse Screen Qual Urine   Result Value Ref Range    Amphetamines Urine Screen Negative Screen Negative    Barbituates Urine Screen Negative Screen Negative    Benzodiazepine Urine Screen Negative Screen Negative    Cannabinoids Urine Screen Positive (A) Screen Negative    Cocaine Urine Screen Negative Screen Negative    Fentanyl Qual Urine Screen Negative Screen Negative    Opiates Urine Screen Negative Screen Negative    PCP Urine Screen Negative Screen Negative   Buprenorphine Urine,  Qualitative   Result Value Ref Range    Buprenorphine Qual Urine Screen Negative Screen Negative   Ethanol urine   Result Value Ref Range    Ethanol Urine Screen Negative Screen Negative   Methadone Qual Urine   Result Value Ref Range    Methadone Urine Screen Negative Screen Negative   Oxycodone Urine, Qualitative   Result Value Ref Range    Oxycodone Urine Screen Negative Screen Negative   Creatinine random urine   Result Value Ref Range    Creatinine Urine mg/dL 439.8 mg/dL

## 2023-10-06 ENCOUNTER — APPOINTMENT (OUTPATIENT)
Dept: LAB | Facility: OTHER | Age: 23
End: 2023-10-06

## 2023-10-06 ENCOUNTER — OFFICE VISIT (OUTPATIENT)
Dept: FAMILY MEDICINE | Facility: OTHER | Age: 23
End: 2023-10-06
Attending: FAMILY MEDICINE
Payer: MEDICAID

## 2023-10-06 ENCOUNTER — PATIENT OUTREACH (OUTPATIENT)
Dept: CARE COORDINATION | Facility: OTHER | Age: 23
End: 2023-10-06

## 2023-10-06 ENCOUNTER — OFFICE VISIT (OUTPATIENT)
Dept: BEHAVIORAL HEALTH | Facility: OTHER | Age: 23
End: 2023-10-06
Attending: SOCIAL WORKER
Payer: MEDICAID

## 2023-10-06 VITALS
WEIGHT: 189.5 LBS | RESPIRATION RATE: 18 BRPM | TEMPERATURE: 97.7 F | DIASTOLIC BLOOD PRESSURE: 62 MMHG | HEART RATE: 71 BPM | BODY MASS INDEX: 24.33 KG/M2 | OXYGEN SATURATION: 97 % | SYSTOLIC BLOOD PRESSURE: 112 MMHG

## 2023-10-06 DIAGNOSIS — F34.1 DYSTHYMIA: ICD-10-CM

## 2023-10-06 DIAGNOSIS — F31.9 BIPOLAR I DISORDER (H): ICD-10-CM

## 2023-10-06 DIAGNOSIS — F11.90 OPIOID USE DISORDER: Primary | ICD-10-CM

## 2023-10-06 DIAGNOSIS — Q63.1 HORSESHOE KIDNEY: ICD-10-CM

## 2023-10-06 DIAGNOSIS — R69 DIAGNOSIS DEFERRED: Primary | ICD-10-CM

## 2023-10-06 LAB
ALBUMIN SERPL BCG-MCNC: 4.9 G/DL (ref 3.5–5.2)
ALP SERPL-CCNC: 73 U/L (ref 40–129)
ALT SERPL W P-5'-P-CCNC: 13 U/L (ref 0–70)
AMPHETAMINES UR QL SCN: ABNORMAL
ANION GAP SERPL CALCULATED.3IONS-SCNC: 8 MMOL/L (ref 7–15)
AST SERPL W P-5'-P-CCNC: 21 U/L (ref 0–45)
BARBITURATES UR QL SCN: ABNORMAL
BENZODIAZ UR QL SCN: ABNORMAL
BILIRUB DIRECT SERPL-MCNC: <0.2 MG/DL (ref 0–0.3)
BILIRUB SERPL-MCNC: 0.4 MG/DL
BUN SERPL-MCNC: 9.3 MG/DL (ref 6–20)
BUPRENORPHINE UR QL: NORMAL
BZE UR QL SCN: ABNORMAL
CALCIUM SERPL-MCNC: 9.8 MG/DL (ref 8.6–10)
CANNABINOIDS UR QL SCN: ABNORMAL
CHLORIDE SERPL-SCNC: 105 MMOL/L (ref 98–107)
CREAT SERPL-MCNC: 1.11 MG/DL (ref 0.67–1.17)
CREAT UR-MCNC: 439.8 MG/DL
DEPRECATED HCO3 PLAS-SCNC: 29 MMOL/L (ref 22–29)
EGFRCR SERPLBLD CKD-EPI 2021: >90 ML/MIN/1.73M2
ERYTHROCYTE [DISTWIDTH] IN BLOOD BY AUTOMATED COUNT: 12.1 % (ref 10–15)
ETHANOL UR QL SCN: NORMAL
FENTANYL UR QL: ABNORMAL
GLUCOSE SERPL-MCNC: 92 MG/DL (ref 70–99)
HCT VFR BLD AUTO: 46.9 % (ref 40–53)
HGB BLD-MCNC: 15.8 G/DL (ref 13.3–17.7)
MCH RBC QN AUTO: 29.6 PG (ref 26.5–33)
MCHC RBC AUTO-ENTMCNC: 33.7 G/DL (ref 31.5–36.5)
MCV RBC AUTO: 88 FL (ref 78–100)
METHADONE UR QL SCN: NORMAL
OPIATES UR QL SCN: ABNORMAL
OXYCODONE UR QL: NORMAL
PCP QUAL URINE (ROCHE): ABNORMAL
PLATELET # BLD AUTO: 305 10E3/UL (ref 150–450)
POTASSIUM SERPL-SCNC: 4.6 MMOL/L (ref 3.4–5.3)
PROT SERPL-MCNC: 7.3 G/DL (ref 6.4–8.3)
RBC # BLD AUTO: 5.34 10E6/UL (ref 4.4–5.9)
SODIUM SERPL-SCNC: 142 MMOL/L (ref 135–145)
WBC # BLD AUTO: 8.9 10E3/UL (ref 4–11)

## 2023-10-06 PROCEDURE — 87389 HIV-1 AG W/HIV-1&-2 AB AG IA: CPT | Mod: ZL | Performed by: FAMILY MEDICINE

## 2023-10-06 PROCEDURE — 36415 COLL VENOUS BLD VENIPUNCTURE: CPT | Mod: ZL | Performed by: FAMILY MEDICINE

## 2023-10-06 PROCEDURE — 80307 DRUG TEST PRSMV CHEM ANLYZR: CPT | Mod: ZL | Performed by: FAMILY MEDICINE

## 2023-10-06 PROCEDURE — 99204 OFFICE O/P NEW MOD 45 MIN: CPT | Performed by: FAMILY MEDICINE

## 2023-10-06 PROCEDURE — G0463 HOSPITAL OUTPT CLINIC VISIT: HCPCS | Performed by: FAMILY MEDICINE

## 2023-10-06 PROCEDURE — 86803 HEPATITIS C AB TEST: CPT | Mod: ZL | Performed by: FAMILY MEDICINE

## 2023-10-06 PROCEDURE — 86706 HEP B SURFACE ANTIBODY: CPT | Mod: ZL | Performed by: FAMILY MEDICINE

## 2023-10-06 PROCEDURE — 85041 AUTOMATED RBC COUNT: CPT | Mod: ZL | Performed by: FAMILY MEDICINE

## 2023-10-06 PROCEDURE — 82570 ASSAY OF URINE CREATININE: CPT | Mod: ZL | Performed by: FAMILY MEDICINE

## 2023-10-06 PROCEDURE — 82248 BILIRUBIN DIRECT: CPT | Mod: ZL | Performed by: FAMILY MEDICINE

## 2023-10-06 PROCEDURE — 87340 HEPATITIS B SURFACE AG IA: CPT | Mod: ZL | Performed by: FAMILY MEDICINE

## 2023-10-06 PROCEDURE — 82310 ASSAY OF CALCIUM: CPT | Mod: ZL | Performed by: FAMILY MEDICINE

## 2023-10-06 RX ORDER — BUPRENORPHINE AND NALOXONE 2; .5 MG/1; MG/1
1 FILM, SOLUBLE BUCCAL; SUBLINGUAL 2 TIMES DAILY
Qty: 12 EACH | Refills: 0 | Status: SHIPPED | OUTPATIENT
Start: 2023-10-06 | End: 2023-10-10

## 2023-10-06 RX ORDER — BUPRENORPHINE HYDROCHLORIDE, NALOXONE HYDROCHLORIDE 2; .5 MG/1; MG/1
1 FILM, SOLUBLE BUCCAL; SUBLINGUAL 2 TIMES DAILY
Qty: 12 EACH | Refills: 0 | Status: SHIPPED | OUTPATIENT
Start: 2023-10-06 | End: 2023-10-06

## 2023-10-06 RX ORDER — DIVALPROEX SODIUM 250 MG/1
250 TABLET, DELAYED RELEASE ORAL 2 TIMES DAILY
Qty: 60 TABLET | Refills: 0 | COMMUNITY
Start: 2023-10-06 | End: 2023-10-18

## 2023-10-06 ASSESSMENT — ENCOUNTER SYMPTOMS
FEVER: 0
WHEEZING: 0
ABDOMINAL PAIN: 0
PALPITATIONS: 0
CHILLS: 0
SHORTNESS OF BREATH: 0

## 2023-10-06 ASSESSMENT — ACTIVITIES OF DAILY LIVING (ADL): DEPENDENT_IADLS:: INDEPENDENT

## 2023-10-06 ASSESSMENT — PAIN SCALES - GENERAL: PAINLEVEL: NO PAIN (0)

## 2023-10-06 NOTE — COMMUNITY RESOURCES LIST (ENGLISH)
10/06/2023   [facility]  N/A  For questions about this resource list or additional care needs, please contact your primary care clinic or care manager.  Phone: [phone]   Email: N/A   Address: [address] [place] [address]   Hours: N/A        Housing       Coordinated Entry access point  1  [facility] Administrative Office Distance: 19.04 miles      In-Person   [address] [place] [address]  Language: English  Hours: Mon - Fri 8:00 AM - 4:30 PM  Fees: Free   Phone: (453) 372-1135 Email: [email or web address] Website: [email or web address]     Housing search assistance  2  [facility] Shelter Distance: 3.09 miles      In-Person   [address] [place], [place] [id number]  Language: English  Hours: Mon - Sun Open 24 Hours  Fees: Free   Phone: (488) 203-4224 Website: http://[email or web address]     3  [facility] Administrative Office Distance: 19.04 miles      Phone/Virtual   [address] [place] [address]  Language: English  Hours: Mon - Fri 8:00 AM - 4:30 PM  Fees: Free   Phone: (755) 561-9465 Email: [email or web address] Website: [email or web address]     Shelter for families  4  [facility] Shelter Distance: 3.09 miles      In-Person   [address] [place] [address]  Language: English  Hours: Mon - Sun Open 24 Hours  Fees: Free   Phone: (696) 831-8069 Website: http://www.[email or web address]     5  [facility] Distance: 19.25 miles      In-Person   [address] [place] [address]  Language: English  Hours: Mon - Sun Open 24 Hours  Fees: Free   Phone: (139) 543-8081 Website: http://www.[email or web address]     Shelter for individuals  6  [facility] Distance: 3.09 miles      In-Person   [address] [place] [address]  Language: English  Hours: Mon - Sun  Open 24 Hours  Fees: Free   Phone: (806) 267-9699 Website: http://www.Location     7  [employer] - [facility] Distance: 19.25 miles      In-Person   [address] [place] [address]  Language: English  Hours: Mon - Sun Open 24 Hours  Fees: Free   Phone: (221) 371-5501 Website: http://wwwPellePharm          Important Numbers & Websites       Emergency Services   911  City Services   311  Poison Control   (800) 530-5638  Suicide Prevention Lifeline   (484) 416-6133 (TALK)  Child Abuse Hotline   (906) 948-4579 (4-A-Child)  Sexual Assault Hotline   (470) 898-6019 (HOPE)  National Runaway Safeline   (450) 747-6282 (RUNAWAY)  All-Options Talkline   (719) 385-2986  Substance Abuse Referral   (755) 999-3102 (HELP)

## 2023-10-06 NOTE — COMMUNITY RESOURCES LIST (ENGLISH)
10/06/2023   [facility]  N/A  For additional resource needs, please contact your health insurance member services or your primary care team.  Phone: [phone]   Email: N/A   Address: [address] [place] [address]   Hours: N/A        Housing       Coordinated Entry access point  1  [facility] Administrative Office Distance: 19.04 miles      In-Person   [address] [place] [address]  Language: English  Hours: Mon - Fri 8:00 AM - 4:30 PM  Fees: Free   Phone: (422) 755-3259 Email: [email or web address] Website: http://www.[employer].org     Housing search assistance  2  [facility] Shelter Distance: 3.09 miles      In-Person   [address] [place] [address]  Language: English  Hours: Mon - Sun Open 24 Hours  Fees: Free   Phone: (994) 448-4030 Website: http://www.[employer].org     3  [facility] Administrative Office Distance: 19.04 miles      Phone/Virtual   [address] [place] [address]  Language: English  Hours: Mon - Fri 8:00 AM - 4:30 PM  Fees: Free   Phone: (577) 111-3257 Email: [email or web address] Website: http://www.[employer].org     Shelter for families  4  [facility] Shelter Distance: 3.09 miles      In-Person   [address] [place], [place] [id number]  Language: English  Hours: Mon - Sun Open 24 Hours  Fees: Free   Phone: (745) 943-3113 Website: http://www.[employer].org     5  [facility] Distance: 19.25 miles      In-Person   [address] [place] [address]  Language: English  Hours: Mon - Sun Open 24 Hours  Fees: Free   Phone: (898) 413-8729 Website: http://www.[employer].org     Shelter for individuals  6  [facility] Shelter Distance: 3.09 miles      In-Person   [address] [place], [place] [id number]  Language: English  Hours: Mon - Sun Open  24 Hours  Fees: Free   Phone: (511) 203-5353 Website: http://www.[employer]     7  [facility] [employer] [facility] - Bill[facility] House Distance: 19.25 miles      In-Person   [address] [place] [address]  Language: English  Hours: Mon - Sun Open 24 Hours  Fees: Free   Phone: (247) 297-4437 Website: http://www.[employer]o          Important Numbers & Websites       [facility]   [address] [place] [address].[facility]  Poison Control   (719) 181-4113 [place]  Suicide and Crisis Lifeline   [address] [place], [place] [address]line.org  Childhelp [place] Child Abuse Hotline   488.697.4477 Childhelphotline.org  [place] Sexual Assault Hotline   (313) 249-1371 (HOPE) [facility] Runaway Safeline   (801) 735-2277 (RUNAWAY) [facility] Hospital[facility].org  Pregnancy & Postpartum Support [place]   Call/text 549-416-2238 Ppsupport[place].org  Substance Abuse National Helpline ([facility]   765-780-HELP (5434) Findtreatment.gov  Emergency Services   911

## 2023-10-06 NOTE — CONFIDENTIAL NOTE
Care Coordination Focused Note:    Received face-to-face referral from RN CC Jyothi DEVI  Was advised patient's medical assistance has lapsed.      Met with patient after appointment with Dr. Felix.  Advised [employer] open enrollment starts in November, but encouraged him to reach out to [facility] ([facility] to ask if can be re-enrolled that way.  Provided phone number to [facility] and patient and FLORIAN CC called on speaker phone.  Because of the long wait time and patient having been in the clinic for a while already, patient asked if he could finish the phone call after he left.  He opted to provide his phone # to receive a call back.      FLORIAN CC provided her direct phone # [phone] and encouraged patient to call if he needs assistance.  Advised FLORIAN CC will follow-up with him at a later time to check if he was able to reapply.    Introduced [facility] services and encouraged patient to reach out if he is interested after his medical assistance gets reinstated.  Shared that patient can also let RN CC know, too.

## 2023-10-06 NOTE — PROGRESS NOTES
Clinic Care Coordination Contact  Clinic Care Coordination Contact  OUTREACH    Referral Information:  Referral Source: Self-patient/Caregiver    Primary Diagnosis: Dual -  MH/CD    Chief Complaint   Patient presents with    Clinic Care Coordination - Face To Face    Clinic Care Coordination - Initial        Universal Utilization: Nnamdi here today to initiate MOUD for his OUD.    Clinic Utilization  Difficulty keeping appointments:: No  Compliance Concerns: No  No-Show Concerns: No  No PCP office visit in Past Year: No  Utilization      Hospital Admissions  1             ED Visits  4             No Show Count (past year)  0                    Current as of: 10/6/2023  8:29 AM                Clinical Concerns:  Current Medical Concerns:  Nnamdi has been using opioids for the last 5 years.  Was snorting up to 30mg of Dilaudid on a daily basis.  He is interested in MOUD.  Did recently admit himself into detox  - has been off opioids for 9 days now.  Not actively in withdrawal at this point.  Is having significant cravings.    Current Behavioral Concerns:Bipolar 1 - sees [facility] at [facility]     Education Provided to patient: Suboxone program went over in detail with pt.  Went over informed consent and treatment agreement.  All questions answered.  Talked about proper way to take Suboxone SL - let film fully dissolve underneath your tongue.  Safe storage encouraged.     Pain  Pain (GOAL):: No  Health Maintenance Reviewed: Due/Overdue Will work on these  Clinical Pathway: None    Medication Management:  Medication review status: Medications reviewed.  Changes noted per patient report.       Functional Status:  Dependent ADLs:: Independent  Dependent IADLs:: Independent  Bed or wheelchair confined:: No  Mobility Status: Independent    Living Situation:  Current living arrangement:: I live in a private home with family  Type of residence:: Apartment    Lifestyle & Psychosocial Needs:    Social Determinants of Health      Food Insecurity: Low Risk  (10/6/2023)    Food Insecurity     Within the past 12 months, did you worry that your food would run out before you got money to buy more?: No     Within the past 12 months, did the food you bought just not last and you didn t have money to get more?: No   Depression: Not on file   Housing Stability: High Risk (10/6/2023)    Housing Stability     Do you have housing? : No     Are you worried about losing your housing?: No   Tobacco Use: High Risk (10/6/2023)    Patient History     Smoking Tobacco Use: Every Day     Smokeless Tobacco Use: Former     Passive Exposure: Not on file   Financial Resource Strain: Low Risk  (10/6/2023)    Financial Resource Strain     Within the past 12 months, have you or your family members you live with been unable to get utilities (heat, electricity) when it was really needed?: No   Alcohol Use: Not on file   Transportation Needs: Low Risk  (10/6/2023)    Transportation Needs     Within the past 12 months, has lack of transportation kept you from medical appointments, getting your medicines, non-medical meetings or appointments, work, or from getting things that you need?: No   Physical Activity: Not on file   Interpersonal Safety: Low Risk  (10/6/2023)    Interpersonal Safety     Do you feel physically and emotionally safe where you currently live?: Yes     Within the past 12 months, have you been hit, slapped, kicked or otherwise physically hurt by someone?: No     Within the past 12 months, have you been humiliated or emotionally abused in other ways by your partner or ex-partner?: No   Stress: Not on file   Social Connections: Not on file     Diet:: Regular  Inadequate nutrition (GOAL):: No  Tube Feeding: No  Inadequate activity/exercise (GOAL):: No  Significant changes in sleep pattern (GOAL): No  Transportation means:: Regular car     Nondenominational or spiritual beliefs that impact treatment:: No  Mental health DX:: Yes  Mental health DX how managed::  Medication  Mental health management concern (GOAL):: No  Chemical Dependency Status: Current Concern  Chemical Dependency Management: Previous treatment  Informal Support system:: Family, Friends, Significant other             Resources and Interventions:  Current Resources:      Community Resources: OP Mental Health  Supplies Currently Used at Home: None  Equipment Currently Used at Home: none  Employment Status: unemployed       Insurance has lapsed.  [facility] BSW consulted this date.  Appreciate assistance.           Referrals Placed: None    The patient consented via Written consent to have contact information and resources sent via text in an unencrypted manner.     Care Plan:  Care Plan: Substance Use       Problem: Substance Use       Goal: Improve substance use status                             Patient/Caregiver understanding: Yes    Outreach Frequency: weekly  Future Appointments                In 4 days Althea Felix MD [facility] [place] [place] [facility]    In 1 week Florina Dobbs APRN CNP [facility]            Plan: Will follow up in 4 days    Signed informed consent and treatment agreement sent down to scanning, as well as his signed electronic communication form.      Encouraged him to reach out to CC with any questions/concerns/problems.  Verbalized understanding.    Jyothi Fowler RN-BSN, [facility] Care Coordinator  [phone] opt. #3

## 2023-10-06 NOTE — LETTER
Informed  Consent  and  Agreement  for  Buprenorphine/Naloxone Treatment  Of Opioid Dependence   Nnamdi Weeks  [id number]         October 6, 2023        Buprenorphine/naloxone can control symptoms of withdrawal and can decrease cravings for other opioids,  We recommend not trying to stop this medication on your own, most people who stop on their own have a relapse of their drug use.      This  agreement  provides important information  on the potential benefits and  risks of opioid medications and shows that both  you and your provider agree on a care plan so  that opioid medications are used in a way that is  safe and effective in treating your withdrawal symptoms and opioid dependence.  This agreement is reviewed and signed by all patients in our practice who  receive Buprenorphine/Naloxone (Suboxone/Subutex).   There are other treatment options for opioid use disorder, such as Methadone and Vivitrol, which were discussed this date.          Expected  Benefits or  Goals of Buprenorphine Treatment    Decreased craving    Improved  ability to engage  in work, social, recreational  and/or physical activities    Improved  quality of life           Potential Risks or Side Effects of Buprenorphine Treatment   Overdose Taking more than the prescribed amount of medication or using with alcohol or other drugs can cause you to stop breathing, resulting in coma, brain damage, or even death.   Risk of overdose to other family members, children are at high risk of being harmed by buprenorphine   Physical side effects May include mood changes, drowsiness, nausea, constipation, urination difficulties, depressed breathing, itching, bone thinning and sexual difficulties, such as lowering male hormone in men and stopping menstrual periods in women.   Withdrawal Buprenorphine/naloxone can bring on severe withdrawal if taken with other opioids.  Physical dependence Suddenly stopping buprenorphine may lead to withdrawal symptoms  including abdominal cramping, pain, diarrhea, sweating, anxiety, irritability and aching.  Hyperalgesia The use of opioids can increase the experience of pain. If this happens, it may require change or discontinuation of medication.   Sleep apnea  Sleep apnea (periods of not breathing while asleep) may be caused or worsened by opioids.  Risk to unborn child Risks to unborn children may include: physical dependence at birth, possible alterations in pain perception, possible increased risk for addiction later in life, among others. Tell your provider if you are or intend to become pregnant.  Do NOT stop buprenorphine if you become pregnant without discussing with your provider.   Victimization There is a risk that you or someone in your household may be the victim of theft, deceit, assault or abuse by people trying to take your medications to misuse them.         Responsibilities  in Buprenorphine/naloxone Therapy  of Opioid Addiction   Your provider's responsibilities:   Listening carefully to your concerns, treating you with care and with due respect, and making clinical decisions based on what he/she believes is in your best interest.    Your responsibilities:   In order to maximize the potential benefits of buprenorphine and to minimize the potential risks, it is important that you accept the following responsibilities.    We are a primary care clinic, not an addiction clinic.    In signing this agreement, you agree to:  1. Use your buprenorphine medications as prescribed for the purpose of treating opioid addiction/opioid use disorder.  2. BE HONEST! We will work with you to help with your opioid dependence if you are upfront with us.  3. Be on time for appointments. Frequent late or missed appointments will result in termination from our program.  4. Be respectful and considerate to all staff and providers at our clinic.  Rude or aggressive behavior to staff including providers, nursing staff,  and any  others will result in termination from the program.  5. Avoid excessive alcohol or other dangerous recreational drug use, especially Benzodiazepines (like Xanax/alprozolam, Ativan/lorazepam, Valium/diazepam) while being prescribed buprenorphine. Evidence of this occurring may result in referral for consultation or higher level of care and possible termination from our program.  6. Not share, sell, trade or in any way provide your medications to others.  Evidence of this occurring may result in termination from our buprenorphine program.  7. Not receive controlled substances from other clinics without discussion with your buprenorphine physician.  Evidence of this occurring may result in termination from our buprenorphine program.  8. Starting or increasing dosage may result in difficulty concentrating; so we would recommend having a  to bring you to appointments where this may happen   9. If opioids are prescribed unexpectedly by another office (for example due to an accident or dental procedure), inform this office within 24 hours and bring documentation of the visit.  10. Have urine/blood drug tests on a random basis and as requested by your provider. Be ready for this at each appointment.  11. Bring your medication to the clinic when requested.  12. Store your medication in a secure location away from children.  13. Participate in other chemical dependency treatments or other forms of care agreed to with your provider.  You are expected to keep these appointments and follow your care plan. Buprenorphine alone is not enough to overcome addiction/dependence; you are required to have a care plan beyond just Buprenorphine.  14. Permit this practice to communicate with other care providers and/or your significant  others as needed to assure buprenorphine is being used appropriately and is beneficial to  your health and well-being.  15.  Plan ahead for refills. You are responsible for planning your appointments  to get refills on time. If you cannot schedule with your primary provider, it is your responsibility to schedule with another buprenorphine provider.  If you are restricted to just on provider then arrangements must be made in advance if you need refills when your provider is not available.  16. No phone refills - you must be seen in clinic by one of our physicians that prescribe buprenorphine.  17. Agree to purchase all buprenorphine/naloxone, and any other medications related to my treatment from one pharmacy.   18. No early refills. If you lose your meds, run out early, or have your meds stolen, we will not give early refills. We CAN, however, help with symptoms of withdrawal. Call us if you have any trouble, and we will try to help. We recommend a system to help lock up meds.  19.  I understand that my clinic can track controlled substance prescriptions from other providers through a central database (prescription monitoring program).  20  To tell my clinic right away if I become pregnant or have a new medical problem treated at another clinic.    Your medications may be continued if they assist you in maintaining sobriety, help you engage in valued activities, and/or enhance your quality of life and you follow the above responsibilities.  Your medications may be discontinued if your goals for treatment are not met, if you experience negative effects from using them, or if you do not follow this agreement.    I have reviewed this document and have been given the opportunity to have any questions  answered. I understand the possible benefits and risks of buprenorphine medications and I accept the  responsibilities described above.    __________________________________        ____________________________________  Patient     Date          Althea Felix MD                     Date

## 2023-10-06 NOTE — PROGRESS NOTES
Procedure: Ear Lavage     Irrigated bilateral ear(s) using 500 ml of water. Significant observations if any: Removed moderate amount of brown and yellow solid hard cerumen. Removal took a moderate amount of time  and was somewhat difficult.  Patient tolerated the procedure well.   Pt scheduled warm handoff with Sharon Marroquin

## 2023-10-07 LAB
HBV SURFACE AB SERPL IA-ACNC: 25.64 M[IU]/ML
HBV SURFACE AB SERPL IA-ACNC: REACTIVE M[IU]/ML
HBV SURFACE AG SERPL QL IA: NONREACTIVE
HCV AB SERPL QL IA: NONREACTIVE
HIV 1+2 AB+HIV1 P24 AG SERPL QL IA: NONREACTIVE

## 2023-10-09 ENCOUNTER — PATIENT OUTREACH (OUTPATIENT)
Dept: CARE COORDINATION | Facility: OTHER | Age: 23
End: 2023-10-09

## 2023-10-09 PROBLEM — F31.9 BIPOLAR DISORDER (H): Status: ACTIVE | Noted: 2023-07-23

## 2023-10-09 PROBLEM — F11.10 OPIOID ABUSE (H): Status: ACTIVE | Noted: 2023-10-06

## 2023-10-09 PROBLEM — F15.10 STIMULANT ABUSE (H): Status: ACTIVE | Noted: 2023-07-23

## 2023-10-09 RX ORDER — AZITHROMYCIN 250 MG/1
TABLET, FILM COATED ORAL
COMMUNITY
End: 2023-10-10

## 2023-10-09 NOTE — PROGRESS NOTES
Assessment & Plan     Opioid use disorder  Doing well. UDS appropriate  - Urine Drugs of Abuse Screen Buprenorphine Urine Qualitative [id number], Ethanol Urine Qualitative [id number], Methadone Urine Qualitative [id number], Oxycodone Urine Qualitative AGR5360, Creatinine Urine Random CXP797; No  - buprenorphine HCl-naloxone HCl (SUBOXONE) 2-0.5 MG per film; Place 1 Film under the tongue 3 times daily         See Patient Instructions    Next appt on 10/16/2023    Althea Felix MD  [facility] - [place] Coto is a 23 year old, presenting for the following health issues:  Recheck Medication      HPI     He is currently taking 2/0.5 mg of buprenorphine 2 times  daily.   Last fill 10.6.23 #12.    Status Since Last Visit:  Have you used any opioids since your last visit?: no use since last visit  Do you feel that your dose of suboxone is too high or too low? Would like to try TID dosing   Have there been cravings for opioids? No   Any withdrawal symptoms?  None     Any side effects from the medication? Bad dreams - demons - unsure if this is related.    Any alcohol use? None   Any other recreational drug use? None  - THC use     Precipitating Factors:  Triggers have been: non-existent   Other Supports:  Do you attend counseling or meet with a therapist? Yes  Do you attend NA or AA meetings? Yes - will be [facility] at [facility]   Do you have/meet with a sponsor? No  Family and support systems have been: Helpful   What other goals have you been working on (job, family, relationships, etc)? Feels much better on Suboxone  Motivation is back  Happier mood  Lab work from last visit went over today       PDMP Review         Value Time User    State PDMP site checked  Yes 10/10/2023  1:06 PM Althea Felix MD                Review of Systems   Constitutional:  Negative for chills and fever.   HENT:  Negative for congestion.    Respiratory:  Negative for  shortness of breath and wheezing.    Cardiovascular:  Negative for chest pain and palpitations.   Gastrointestinal:  Negative for abdominal pain.            Objective    /60   Pulse 97   Temp 98.9  F (37.2  C) (Tympanic)   Resp 18   Wt 85.9 kg (189 lb 6.4 oz)   SpO2 95%   BMI 24.32 kg/m    Body mass index is 24.32 kg/m .  Physical Exam  Constitutional:       General: He is not in acute distress.     Appearance: He is not ill-appearing.   Cardiovascular:      Rate and Rhythm: Normal rate and regular rhythm.      Pulses: Normal pulses.      Heart sounds: No murmur heard.  Pulmonary:      Effort: Pulmonary effort is normal. No respiratory distress.      Breath sounds: No wheezing or rales.   Neurological:      Mental Status: He is alert.   Psychiatric:         Mood and Affect: Mood normal.          Results for orders placed or performed in visit on 10/10/23 (from the past 24 hour(s))   Urine Drugs of Abuse Screen Buprenorphine Urine Qualitative [id number], Ethanol Urine Qualitative AGU430, Methadone Urine Qualitative MIW7398, Oxycodone Urine Qualitative ZRE1996, Creatinine Urine Random ZBA347; No    Narrative    The following orders were created for panel order Urine Drugs of Abuse Screen Buprenorphine Urine Qualitative [id number], Ethanol Urine Qualitative LOT105, Methadone Urine Qualitative [id number], Oxycodone Urine Qualitative NZF6554, Creatinine Urine Random DEY048; No.  Procedure                               Abnormality         Status                     ---------                               -----------         ------                     Drug Abuse Screen Qual U...[[id number]]  Abnormal            Final result               Buprenorphine Urine, Rogelio...[[id number]]  Abnormal            Final result               Ethanol urine[[id number]]                Normal              Final result               Methadone Qual Urine[[id number]]         Normal              Final result               Oxycodone Urine,  Qualita...[[id number]]  Normal              Final result               Creatinine random urine[[id number]]                          Final result                 Please view results for these tests on the individual orders.   Drug Abuse Screen Qual Urine   Result Value Ref Range    Amphetamines Urine Screen Negative Screen Negative    Barbituates Urine Screen Negative Screen Negative    Benzodiazepine Urine Screen Negative Screen Negative    Cannabinoids Urine Screen Positive (A) Screen Negative    Cocaine Urine Screen Negative Screen Negative    Fentanyl Qual Urine Screen Negative Screen Negative    Opiates Urine Screen Negative Screen Negative    PCP Urine Screen Negative Screen Negative   Buprenorphine Urine, Qualitative   Result Value Ref Range    Buprenorphine Qual Urine Screen Positive (A) Screen Negative   Ethanol urine   Result Value Ref Range    Ethanol Urine Screen Negative Screen Negative   Methadone Qual Urine   Result Value Ref Range    Methadone Urine Screen Negative Screen Negative   Oxycodone Urine, Qualitative   Result Value Ref Range    Oxycodone Urine Screen Negative Screen Negative   Creatinine random urine   Result Value Ref Range    Creatinine Urine mg/dL 53.1 mg/dL

## 2023-10-09 NOTE — PROGRESS NOTES
Clinic Care Coordination Contact  Care Team Conversations    CC sent Nnamdi a text message this date reminding him of his appointment tomorrow and to arrive at 12:45PM.    Jyothi Fowler RN-BSN, [facility] Care Coordinator  [phone] opt. #3

## 2023-10-10 ENCOUNTER — PATIENT OUTREACH (OUTPATIENT)
Dept: CARE COORDINATION | Facility: OTHER | Age: 23
End: 2023-10-10

## 2023-10-10 ENCOUNTER — APPOINTMENT (OUTPATIENT)
Dept: LAB | Facility: OTHER | Age: 23
End: 2023-10-10

## 2023-10-10 ENCOUNTER — OFFICE VISIT (OUTPATIENT)
Dept: FAMILY MEDICINE | Facility: OTHER | Age: 23
End: 2023-10-10
Attending: FAMILY MEDICINE
Payer: MEDICAID

## 2023-10-10 VITALS
TEMPERATURE: 98.9 F | SYSTOLIC BLOOD PRESSURE: 112 MMHG | HEART RATE: 97 BPM | DIASTOLIC BLOOD PRESSURE: 60 MMHG | WEIGHT: 189.4 LBS | BODY MASS INDEX: 24.32 KG/M2 | OXYGEN SATURATION: 95 % | RESPIRATION RATE: 18 BRPM

## 2023-10-10 DIAGNOSIS — F11.90 OPIOID USE DISORDER: Primary | ICD-10-CM

## 2023-10-10 LAB
AMPHETAMINES UR QL SCN: ABNORMAL
BARBITURATES UR QL SCN: ABNORMAL
BENZODIAZ UR QL SCN: ABNORMAL
BUPRENORPHINE UR QL: ABNORMAL
BZE UR QL SCN: ABNORMAL
CANNABINOIDS UR QL SCN: ABNORMAL
CREAT UR-MCNC: 53.1 MG/DL
ETHANOL UR QL SCN: NORMAL
FENTANYL UR QL: ABNORMAL
METHADONE UR QL SCN: NORMAL
OPIATES UR QL SCN: ABNORMAL
OXYCODONE UR QL: NORMAL
PCP QUAL URINE (ROCHE): ABNORMAL

## 2023-10-10 PROCEDURE — 82570 ASSAY OF URINE CREATININE: CPT | Mod: ZL | Performed by: FAMILY MEDICINE

## 2023-10-10 PROCEDURE — 80307 DRUG TEST PRSMV CHEM ANLYZR: CPT | Mod: ZL | Performed by: FAMILY MEDICINE

## 2023-10-10 PROCEDURE — 99213 OFFICE O/P EST LOW 20 MIN: CPT | Performed by: FAMILY MEDICINE

## 2023-10-10 PROCEDURE — G0463 HOSPITAL OUTPT CLINIC VISIT: HCPCS | Mod: 25

## 2023-10-10 RX ORDER — BUPRENORPHINE AND NALOXONE 2; .5 MG/1; MG/1
1 FILM, SOLUBLE BUCCAL; SUBLINGUAL 3 TIMES DAILY
Qty: 18 EACH | Refills: 0 | Status: SHIPPED | OUTPATIENT
Start: 2023-10-10 | End: 2023-10-16 | Stop reason: DRUGHIGH

## 2023-10-10 RX ORDER — BUPRENORPHINE AND NALOXONE 2; .5 MG/1; MG/1
1 FILM, SOLUBLE BUCCAL; SUBLINGUAL 3 TIMES DAILY
Qty: 18 EACH | Refills: 0 | Status: SHIPPED | OUTPATIENT
Start: 2023-10-10 | End: 2023-10-10

## 2023-10-10 ASSESSMENT — ENCOUNTER SYMPTOMS
SHORTNESS OF BREATH: 0
FEVER: 0
ABDOMINAL PAIN: 0
CHILLS: 0
PALPITATIONS: 0
WHEEZING: 0

## 2023-10-10 ASSESSMENT — PAIN SCALES - GENERAL: PAINLEVEL: MILD PAIN (3)

## 2023-10-10 NOTE — PROGRESS NOTES
Clinic Care Coordination Contact  Follow Up Progress Note      Assessment: CC attended office visit with pt and Dr. Felix this date.  Nnamdi is doing very well since starting Suboxone last week.  Reports he is taking 2/0.5mg BID with adequate relief of cravings.  He states that his mood is better and his motivation is back.  He is doing to [facility] today after this appointment to start outpatient treatment.  He is doing this voluntarily.  Would like to increase dose to 2/0.5mg TID if possible.  Dr. Felix is OK with this.  His MA lapsed and is not able to kick back in until November 1.  [facility] to make sure his application is finished.  He does have phone number to call.  Did fill out [employer] application this date and completed form was sent to the Financial Counselors - requested that they let CC know if/when he is approved.       Care Gaps:    Health Maintenance Due   Topic Date Due    COVID-19 Vaccine (1) Never done    YEARLY PREVENTIVE VISIT  04/07/2017    HEPATITIS A IMMUNIZATION (1 of 2 - Risk 2-dose series) Never done    Pneumococcal Vaccine: Pediatrics (0 to 5 Years) and At-Risk Patients (6 to 64 Years) (2 - PCV) 11/09/2021    INFLUENZA VACCINE (1) 09/01/2023       Will continue to work on these    Care Plans  Care Plan: Substance Use       Problem: Substance Use       Goal: Improve substance use status       This Visit's Progress: On track                            Intervention/Education provided during outreach: Call the [facility]iatric hospital today to get your insurance active.  [employer] application filled out this date.  Encouraged and agreed with his decision to start outpatient treatment.  Went over lab results from visit last week.     Outreach Frequency: weekly        Plan:   Increase Suboxone to 2/0.5mg TID this date - as prescribed by Dr. Felix.    Care Coordinator will follow up in 1 week, sooner if he has concerns.    Jyothi Fowler, RN-BSN, [facility]  Coordinator  [phone] opt. #3

## 2023-10-13 ENCOUNTER — PATIENT OUTREACH (OUTPATIENT)
Dept: CARE COORDINATION | Facility: OTHER | Age: 23
End: 2023-10-13

## 2023-10-13 NOTE — PROGRESS NOTES
Clinic Care Coordination Contact  Care Team Conversations    CC sent Nnamdi a text message this date reminding him of his appointment on Monday, October 16, and to arrive at 12:45PM.    Jyothi Fowler RN-BSN, [facility] Care Coordinator  [phone] opt. #3

## 2023-10-13 NOTE — PROGRESS NOTES
Assessment & Plan     Opioid use disorder  Suboxone 2-1 tid to qid is not longer effective, will increase to 4-2 bid  Nnamdi will text /call if having cravings.    UDS appropriate  [place] PDMP reviewed and appropriate    - Urine Drugs of Abuse Screen Buprenorphine Urine Qualitative [id number], Ethanol Urine Qualitative VTA509, Methadone Urine Qualitative FMO2157, Oxycodone Urine Qualitative QHR5006, Creatinine Urine Random LUA265; No    - Rx sent for suboxone 4-1 bid, qty 6 d/t issues with insurance and ambar care in process  - okay to increase to 4-1 tid, if needed before next visit        See Patient Instructions    Next visit 10/30/2023    Althea Felix MD  [facility] - [place] Coto is a 23 year old, presenting for the following health issues:  Recheck Medication        10/16/2023    12:51 PM   Additional Questions   Roomed by Lisa Julien   Accompanied by none         10/16/2023    12:51 PM   Patient Reported Additional Medications   Patient reports taking the following new medications none       HPI       He is currently taking 2/0.5 mg of buprenorphine 3 times daily.   Last fill 10.10.23 #18.     - [facility] [place] - finance department unable to review application until 10/17     Status Since Last Visit:  Have you used any opioids since your last visit?: no use since last visit  Do you feel that your dose of suboxone is too high or too low? Too low at suboxone 2-0.5 qty of 4 per day  Have there been cravings for opioids? Yes:      Any withdrawal symptoms? nausea , vomiting , and headache     Any side effects from the medication?  Some lightheadness  Any alcohol use? No   Any other recreational drug use? marijuana    Precipitating Factors:  Triggers have been: moderate random thoughts, getting overwhelmed  Other Supports:  Do you attend counseling or meet with a therapist? Yes  Do you attend NA or AA meetings? Yes  Do you have/meet with a sponsor? No  Family and support systems  have been: supportive GF and family   What other goals have you been working on (job, family, relationships, etc)?     Answers submitted by the patient for this visit:  Patient Health Questionnaire (Submitted on 10/16/2023)  If you checked off any problems, how difficult have these problems made it for you to do your work, take care of things at home, or get along with other people?: Somewhat difficult  PHQ9 TOTAL SCORE: 6    PDMP Review         Value Time User    State PDMP site checked  Yes 10/10/2023  1:06 PM Althea Felix MD              Review of Systems   Constitutional:  Negative for chills and fever.   HENT:  Negative for congestion.    Respiratory:  Negative for shortness of breath and wheezing.    Cardiovascular:  Negative for chest pain and palpitations.   Gastrointestinal:  Positive for nausea. Negative for abdominal pain.   Psychiatric/Behavioral:          Cravings            Objective    /67 (BP Location: Left arm, Patient Position: Sitting, Cuff Size: Adult Regular)   Pulse 69   Temp 97.6  F (36.4  C) (Tympanic)   Wt 87.4 kg (192 lb 9.6 oz)   SpO2 94%   BMI 24.73 kg/m    Body mass index is 24.73 kg/m .  Physical Exam  Constitutional:       General: He is not in acute distress.     Appearance: He is not ill-appearing.   Cardiovascular:      Rate and Rhythm: Normal rate and regular rhythm.      Pulses: Normal pulses.      Heart sounds: No murmur heard.  Pulmonary:      Effort: Pulmonary effort is normal. No respiratory distress.      Breath sounds: No wheezing or rales.   Neurological:      Mental Status: He is alert.   Psychiatric:         Mood and Affect: Mood normal.          Results for orders placed or performed in visit on 10/16/23 (from the past 24 hour(s))   Urine Drugs of Abuse Screen Buprenorphine Urine Qualitative ITA0144, Ethanol Urine Qualitative CBS522, Methadone Urine Qualitative ROB2753, Oxycodone Urine Qualitative EAB5943, Creatinine Urine Random NGV727; No     Narrative    The following orders were created for panel order Urine Drugs of Abuse Screen Buprenorphine Urine Qualitative [id number], Ethanol Urine Qualitative AFX109, Methadone Urine Qualitative TGE7469, Oxycodone Urine Qualitative OFA1003, Creatinine Urine Random BEP079; No.  Procedure                               Abnormality         Status                     ---------                               -----------         ------                     Drug Abuse Screen Qual U...[[id number]]  Abnormal            Final result               Buprenorphine Urine, Rogelio...[[id number]]  Abnormal            Final result               Ethanol urine[[id number]]                Normal              Final result               Methadone Qual Urine[[id number]]         Normal              Final result               Oxycodone Urine, Qualita...[[id number]]  Normal              Final result               Creatinine random urine[[id number]]                          Final result                 Please view results for these tests on the individual orders.   Drug Abuse Screen Qual Urine   Result Value Ref Range    Amphetamines Urine Screen Negative Screen Negative    Barbituates Urine Screen Negative Screen Negative    Benzodiazepine Urine Screen Negative Screen Negative    Cannabinoids Urine Screen Positive (A) Screen Negative    Cocaine Urine Screen Negative Screen Negative    Fentanyl Qual Urine Screen Negative Screen Negative    Opiates Urine Screen Negative Screen Negative    PCP Urine Screen Negative Screen Negative   Buprenorphine Urine, Qualitative   Result Value Ref Range    Buprenorphine Qual Urine Screen Positive (A) Screen Negative   Ethanol urine   Result Value Ref Range    Ethanol Urine Screen Negative Screen Negative   Methadone Qual Urine   Result Value Ref Range    Methadone Urine Screen Negative Screen Negative   Oxycodone Urine, Qualitative   Result Value Ref Range    Oxycodone Urine Screen Negative Screen Negative   Creatinine  random urine   Result Value Ref Range    Creatinine Urine mg/dL 297.7 mg/dL

## 2023-10-16 ENCOUNTER — OFFICE VISIT (OUTPATIENT)
Dept: FAMILY MEDICINE | Facility: OTHER | Age: 23
End: 2023-10-16
Attending: FAMILY MEDICINE
Payer: MEDICAID

## 2023-10-16 ENCOUNTER — TELEPHONE (OUTPATIENT)
Dept: FAMILY MEDICINE | Facility: OTHER | Age: 23
End: 2023-10-16

## 2023-10-16 ENCOUNTER — APPOINTMENT (OUTPATIENT)
Dept: LAB | Facility: OTHER | Age: 23
End: 2023-10-16

## 2023-10-16 VITALS
OXYGEN SATURATION: 94 % | BODY MASS INDEX: 24.73 KG/M2 | SYSTOLIC BLOOD PRESSURE: 113 MMHG | DIASTOLIC BLOOD PRESSURE: 67 MMHG | TEMPERATURE: 97.6 F | WEIGHT: 192.6 LBS | HEART RATE: 69 BPM

## 2023-10-16 DIAGNOSIS — F11.90 OPIOID USE DISORDER: Primary | ICD-10-CM

## 2023-10-16 DIAGNOSIS — F11.90 OPIOID USE DISORDER: ICD-10-CM

## 2023-10-16 LAB
AMPHETAMINES UR QL SCN: ABNORMAL
BARBITURATES UR QL SCN: ABNORMAL
BENZODIAZ UR QL SCN: ABNORMAL
BUPRENORPHINE UR QL: ABNORMAL
BZE UR QL SCN: ABNORMAL
CANNABINOIDS UR QL SCN: ABNORMAL
CREAT UR-MCNC: 297.7 MG/DL
ETHANOL UR QL SCN: NORMAL
FENTANYL UR QL: ABNORMAL
METHADONE UR QL SCN: NORMAL
OPIATES UR QL SCN: ABNORMAL
OXYCODONE UR QL: NORMAL
PCP QUAL URINE (ROCHE): ABNORMAL

## 2023-10-16 PROCEDURE — G0463 HOSPITAL OUTPT CLINIC VISIT: HCPCS | Performed by: FAMILY MEDICINE

## 2023-10-16 PROCEDURE — 80307 DRUG TEST PRSMV CHEM ANLYZR: CPT | Mod: ZL,91 | Performed by: FAMILY MEDICINE

## 2023-10-16 PROCEDURE — 99214 OFFICE O/P EST MOD 30 MIN: CPT | Performed by: FAMILY MEDICINE

## 2023-10-16 PROCEDURE — 82570 ASSAY OF URINE CREATININE: CPT | Mod: ZL | Performed by: FAMILY MEDICINE

## 2023-10-16 RX ORDER — BUPRENORPHINE AND NALOXONE 4; 1 MG/1; MG/1
1 FILM, SOLUBLE BUCCAL; SUBLINGUAL 2 TIMES DAILY
Qty: 6 FILM | Refills: 0 | Status: SHIPPED | OUTPATIENT
Start: 2023-10-16 | End: 2023-10-17

## 2023-10-16 RX ORDER — BUPRENORPHINE AND NALOXONE 4; 1 MG/1; MG/1
1 FILM, SOLUBLE BUCCAL; SUBLINGUAL 2 TIMES DAILY
Qty: 6 FILM | Refills: 0 | Status: SHIPPED | OUTPATIENT
Start: 2023-10-16 | End: 2023-10-16

## 2023-10-16 RX ORDER — BUPRENORPHINE AND NALOXONE 2; .5 MG/1; MG/1
1 FILM, SOLUBLE BUCCAL; SUBLINGUAL 3 TIMES DAILY
Qty: 6 EACH | Refills: 0 | Status: CANCELLED | OUTPATIENT
Start: 2023-10-16

## 2023-10-16 ASSESSMENT — PAIN SCALES - GENERAL: PAINLEVEL: MILD PAIN (3)

## 2023-10-16 ASSESSMENT — ENCOUNTER SYMPTOMS
ABDOMINAL PAIN: 0
PALPITATIONS: 0
WHEEZING: 0
NAUSEA: 1
SHORTNESS OF BREATH: 0
CHILLS: 0
FEVER: 0

## 2023-10-16 ASSESSMENT — PATIENT HEALTH QUESTIONNAIRE - PHQ9
SUM OF ALL RESPONSES TO PHQ QUESTIONS 1-9: 6
SUM OF ALL RESPONSES TO PHQ QUESTIONS 1-9: 6
10. IF YOU CHECKED OFF ANY PROBLEMS, HOW DIFFICULT HAVE THESE PROBLEMS MADE IT FOR YOU TO DO YOUR WORK, TAKE CARE OF THINGS AT HOME, OR GET ALONG WITH OTHER PEOPLE: SOMEWHAT DIFFICULT

## 2023-10-16 NOTE — TELEPHONE ENCOUNTER
Patient called stating [facility] in [place] is currently closed. [facility] [place].  Medication and updated pharmacy pended.

## 2023-10-17 ENCOUNTER — CARE COORDINATION (OUTPATIENT)
Dept: BEHAVIORAL HEALTH | Facility: OTHER | Age: 23
End: 2023-10-17

## 2023-10-17 DIAGNOSIS — F11.90 OPIOID USE DISORDER: ICD-10-CM

## 2023-10-17 RX ORDER — BUPRENORPHINE AND NALOXONE 4; 1 MG/1; MG/1
1 FILM, SOLUBLE BUCCAL; SUBLINGUAL 2 TIMES DAILY
Qty: 26 FILM | Refills: 0 | Status: SHIPPED | OUTPATIENT
Start: 2023-10-17 | End: 2023-10-25

## 2023-10-17 NOTE — PROGRESS NOTES
Care Coordination Focused Note:    Sent text message to patient to inquire if he was able to speak with someone about renewing his medical assistance.  Shared the following resource for free assistance with applying for medical assistance:    [employer]  (Locations in [facility])  [phone]

## 2023-10-17 NOTE — TELEPHONE ENCOUNTER
Nnamdi was approved for 100% [facility] this date.  New RX pended to go to [facility].

## 2023-10-18 DIAGNOSIS — F31.9 BIPOLAR I DISORDER (H): Primary | ICD-10-CM

## 2023-10-18 RX ORDER — DIVALPROEX SODIUM 250 MG/1
250 TABLET, DELAYED RELEASE ORAL 2 TIMES DAILY
Qty: 60 TABLET | Refills: 0 | Status: SHIPPED | OUTPATIENT
Start: 2023-10-18 | End: 2023-12-29

## 2023-10-18 NOTE — TELEPHONE ENCOUNTER
Depakote - was supposed to start 250mg BID on 10.6.23 - currently without insurance.  Originally prescribed by Arely Gannon - unable to pay.  Was approved for 100% ambar care yesterday - MA should be active 11/1/23.  In order for ambar care to  cost, RX must come from [facility]      Last Written Prescription Date:  10.6.23  Last Fill Quantity: #60,   # refills: 0  Last Office Visit: 10.16.23  Future Office visit:    Next 5 appointments (look out 90 days)      Oct 30, 2023  1:30 PM  (Arrive by 1:15 PM)  SHORT with Althea Felix MD  [facility] [place] ([facility] [place] ) [address]  [place] [address]  [phone]             Routing refill request to provider for review/approval because:

## 2023-10-20 ENCOUNTER — PATIENT OUTREACH (OUTPATIENT)
Dept: CARE COORDINATION | Facility: OTHER | Age: 23
End: 2023-10-20

## 2023-10-20 NOTE — PROGRESS NOTES
Clinic Care Coordination Contact  Care Team Conversations    CC received a message from Nnamdi this date updating CC that he has been having to taking his Suboxone - 4/1mg TID instead of BID to keep his cravings minimal.  He was questioning if this was OK because he will run out of films before his next appointment.  CC thanked him for the update and informed him, that yes - Dr. Felix put that information in her last note -ok to increase to TID dosing before next appt and to reach out to CC.   He has 18 strips left.  Requested that he update CC on Monday and we can go from there.  He was happy and agreeable with this.  Plan to send refill to Dr. Felix early next week if he needs one.      Jyothi Fowler, RN-BSN, [facility] Care Coordinator  [phone] opt. #3      
(2) more than 100 beats/min

## 2023-10-25 DIAGNOSIS — F11.90 OPIOID USE DISORDER: ICD-10-CM

## 2023-10-25 RX ORDER — BUPRENORPHINE AND NALOXONE 4; 1 MG/1; MG/1
1 FILM, SOLUBLE BUCCAL; SUBLINGUAL 3 TIMES DAILY
Qty: 12 FILM | Refills: 0 | Status: SHIPPED | OUTPATIENT
Start: 2023-10-25 | End: 2023-10-31

## 2023-10-25 NOTE — TELEPHONE ENCOUNTER
Suboxone - Nnamdi did reach out to  last week stating he had to increase to TID dosing, which was discussed at last visit  - has enough left for today and tomorrow     Last Written Prescription Date:  10.17.23  Last Fill Quantity: #26,   # refills: 0  Last Office Visit: 10.16.23  Future Office visit:    Next 5 appointments (look out 90 days)      Oct 30, 2023  1:30 PM  (Arrive by 1:15 PM)  SHORT with Althea Felix MD  [facility] [place] ([facility] [place] ) [address]  [place] [address]  [phone]             Routing refill request to provider for review/approval because:  Drug not on the [facility], [facility] or [facility] refill protocol or controlled substance     Home

## 2023-10-27 ENCOUNTER — PATIENT OUTREACH (OUTPATIENT)
Dept: CARE COORDINATION | Facility: OTHER | Age: 23
End: 2023-10-27

## 2023-10-27 NOTE — PROGRESS NOTES
Clinic Care Coordination Contact  Care Team Conversations    CC sent Nnamdi a text message this date reminding him of his appointment on Monday, October 30, and to arrive at 1:15PM.    Jyothi Fowler RN-BSN, [facility] Care Coordinator  [phone] opt. #3

## 2023-10-30 ENCOUNTER — PATIENT OUTREACH (OUTPATIENT)
Dept: CARE COORDINATION | Facility: OTHER | Age: 23
End: 2023-10-30

## 2023-10-30 NOTE — PROGRESS NOTES
[facility] Care Coordination Contact  Care Team Conversations    Received a text from Nnamdi this date asking if he can switch his appointment to 10/31.  Appt rescheduled for 10/31, arrive at 12:45PM.  He was happy and agreeable with this.    Jyothi Fowler, RN-BSN, [facility] Care Coordinator  [phone] opt. #3

## 2023-10-30 NOTE — PROGRESS NOTES
Assessment & Plan     Opioid use disorder  Doing well. No adjustment in dosage today. UDS appropriate  - Urine Drug Screen Buprenorphine Urine Qualitative [id number], Ethanol Urine Qualitative [id number], Methadone Urine Qualitative [id number], Oxycodone Urine Qualitative ZPC5648, Creatinine Urine Random [id number]  - buprenorphine HCl-naloxone HCl (SUBOXONE) 4-1 MG per film; Place 1 Film under the tongue 3 times daily    See Patient Instructions    Next visit 11/14/2023    Althea Felix MD  [facility] - [place] Coto is a 23 year old, presenting for the following health issues:  Recheck Medication      HPI       He is currently taking 4/1 mg of buprenorphine 3 times daily.   Last fill 10.26.23 #12.    Status Since Last Visit:  Have you used any opioids since your last visit?: no use since last visit  Do you feel that your dose of suboxone is too high or too low? Adequate  Have there been cravings for opioids? No   Any withdrawal symptoms?  None     Any side effects from the medication?  Some dizziness -   Any alcohol use? None  Any other recreational drug use? THC -    Precipitating Factors:  Triggers have been: non-existent   Other Supports:  Do you attend counseling or meet with a therapist? Yes - Tu/Th  Do you attend NA or AA meetings? No  Do you have/meet with a sponsor? No  Family and support systems have been: helpful  What other goals have you been working on (job, family, relationships, etc)? Had his daughter - Ehsan -  Attending outpatient treatment   Waiting on insurance  Follow up with Arely Gannon - unknown of his appointment - possibly next week          4/7/2016    10:08 AM 10/16/2023    12:49 PM   PHQ-9 SCORE   PHQ-9 Total Score MyChart  6 (Mild depression)   PHQ-9 Total Score 10 6            No data to display              PDMP Review         Value Time User    State PDMP site checked  Yes 10/31/2023  1:03 PM Althea Felix MD              Review of Systems    Constitutional:  Positive for fatigue (new baby). Negative for chills and fever.   HENT:  Negative for congestion.    Respiratory:  Negative for shortness of breath and wheezing.    Cardiovascular:  Negative for chest pain and palpitations.   Gastrointestinal:  Negative for abdominal pain.   Psychiatric/Behavioral:  Negative for dysphoric mood.           Objective    /70   Pulse 74   Temp 97.6  F (36.4  C) (Tympanic)   Resp 18   Wt 85.2 kg (187 lb 12.8 oz)   SpO2 96%   BMI 24.11 kg/m    Body mass index is 24.11 kg/m .  Physical Exam  Constitutional:       General: He is not in acute distress.     Appearance: He is not ill-appearing.   Cardiovascular:      Rate and Rhythm: Normal rate and regular rhythm.      Pulses: Normal pulses.      Heart sounds: No murmur heard.  Pulmonary:      Effort: Pulmonary effort is normal. No respiratory distress.      Breath sounds: No wheezing or rales.   Neurological:      Mental Status: He is alert.   Psychiatric:         Mood and Affect: Mood normal.          Results for orders placed or performed in visit on 10/31/23 (from the past 24 hour(s))   Urine Drug Screen Buprenorphine Urine Qualitative [id number], Ethanol Urine Qualitative PVF725, Methadone Urine Qualitative LZR9632, Oxycodone Urine Qualitative CSA6803, Creatinine Urine Random ASR791    Narrative    The following orders were created for panel order Urine Drug Screen Buprenorphine Urine Qualitative [id number], Ethanol Urine Qualitative BRF910, Methadone Urine Qualitative EJV6904, Oxycodone Urine Qualitative WLO9640, Creatinine Urine Random VHJ508.  Procedure                               Abnormality         Status                     ---------                               -----------         ------                     Urine Drug Screen Panel[[id number]]      Abnormal            Final result               Buprenorphine Urine, Rogelio...[[id number]]  Abnormal            Final result               Ethanol urine[[id number]]                 Normal              Final result               Methadone Qual Urine[[id number]]         Normal              Final result               Oxycodone Urine, Qualita...[[id number]]  Normal              Final result               Creatinine random urine[[id number]]                          Final result                 Please view results for these tests on the individual orders.   Urine Drug Screen Panel   Result Value Ref Range    Amphetamines Urine Screen Negative Screen Negative    Barbituates Urine Screen Negative Screen Negative    Benzodiazepine Urine Screen Negative Screen Negative    Cannabinoids Urine Screen Positive (A) Screen Negative    Cocaine Urine Screen Negative Screen Negative    Fentanyl Qual Urine Screen Negative Screen Negative    Opiates Urine Screen Negative Screen Negative    PCP Urine Screen Negative Screen Negative   Buprenorphine Urine, Qualitative   Result Value Ref Range    Buprenorphine Qual Urine Screen Positive (A) Screen Negative   Ethanol urine   Result Value Ref Range    Ethanol Urine Screen Negative Screen Negative   Methadone Qual Urine   Result Value Ref Range    Methadone Urine Screen Negative Screen Negative   Oxycodone Urine, Qualitative   Result Value Ref Range    Oxycodone Urine Screen Negative Screen Negative   Creatinine random urine   Result Value Ref Range    Creatinine Urine mg/dL 228.5 mg/dL

## 2023-10-31 ENCOUNTER — PATIENT OUTREACH (OUTPATIENT)
Dept: CARE COORDINATION | Facility: OTHER | Age: 23
End: 2023-10-31

## 2023-10-31 ENCOUNTER — OFFICE VISIT (OUTPATIENT)
Dept: FAMILY MEDICINE | Facility: OTHER | Age: 23
End: 2023-10-31
Attending: FAMILY MEDICINE
Payer: MEDICAID

## 2023-10-31 ENCOUNTER — APPOINTMENT (OUTPATIENT)
Dept: LAB | Facility: OTHER | Age: 23
End: 2023-10-31
Payer: MEDICAID

## 2023-10-31 VITALS
SYSTOLIC BLOOD PRESSURE: 100 MMHG | WEIGHT: 187.8 LBS | DIASTOLIC BLOOD PRESSURE: 70 MMHG | OXYGEN SATURATION: 96 % | HEART RATE: 74 BPM | RESPIRATION RATE: 18 BRPM | BODY MASS INDEX: 24.11 KG/M2 | TEMPERATURE: 97.6 F

## 2023-10-31 DIAGNOSIS — F11.90 OPIOID USE DISORDER: Primary | ICD-10-CM

## 2023-10-31 LAB
AMPHETAMINES UR QL SCN: ABNORMAL
BARBITURATES UR QL SCN: ABNORMAL
BENZODIAZ UR QL SCN: ABNORMAL
BUPRENORPHINE UR QL: ABNORMAL
BZE UR QL SCN: ABNORMAL
CANNABINOIDS UR QL SCN: ABNORMAL
CREAT UR-MCNC: 228.5 MG/DL
ETHANOL UR QL SCN: NORMAL
FENTANYL UR QL: ABNORMAL
METHADONE UR QL SCN: NORMAL
OPIATES UR QL SCN: ABNORMAL
OXYCODONE UR QL: NORMAL
PCP QUAL URINE (ROCHE): ABNORMAL

## 2023-10-31 PROCEDURE — 80307 DRUG TEST PRSMV CHEM ANLYZR: CPT | Mod: ZL | Performed by: FAMILY MEDICINE

## 2023-10-31 PROCEDURE — 99213 OFFICE O/P EST LOW 20 MIN: CPT | Performed by: FAMILY MEDICINE

## 2023-10-31 PROCEDURE — G0463 HOSPITAL OUTPT CLINIC VISIT: HCPCS | Performed by: FAMILY MEDICINE

## 2023-10-31 PROCEDURE — 82570 ASSAY OF URINE CREATININE: CPT | Mod: ZL,XU | Performed by: FAMILY MEDICINE

## 2023-10-31 RX ORDER — BUPRENORPHINE AND NALOXONE 4; 1 MG/1; MG/1
1 FILM, SOLUBLE BUCCAL; SUBLINGUAL 3 TIMES DAILY
Qty: 42 FILM | Refills: 0 | Status: SHIPPED | OUTPATIENT
Start: 2023-10-31 | End: 2023-11-14

## 2023-10-31 ASSESSMENT — ENCOUNTER SYMPTOMS
SHORTNESS OF BREATH: 0
ABDOMINAL PAIN: 0
FEVER: 0
CHILLS: 0
DYSPHORIC MOOD: 0
PALPITATIONS: 0
WHEEZING: 0
FATIGUE: 1

## 2023-10-31 ASSESSMENT — PAIN SCALES - GENERAL: PAINLEVEL: MILD PAIN (3)

## 2023-10-31 NOTE — PROGRESS NOTES
Clinic Care Coordination Contact  Follow Up Progress Note      Assessment: CC met with Nnamdi prior to his appointment with Dr. Felix this date.   Nnamdi is doing well in recovery.  His Suboxone was increased to 4/1mg TID since last visit.  He would like to continue on this dose - cravings are at a manageable level.  No withdrawal symptoms.  Had baby girl since last visit - Ehsan.  Still attending outpatient treatment.   No questions or concerns today.      Care Gaps:    Health Maintenance Due   Topic Date Due    COVID-19 Vaccine (1) Never done    YEARLY PREVENTIVE VISIT  04/07/2017    HEPATITIS A IMMUNIZATION (1 of 2 - Risk 2-dose series) Never done    Pneumococcal Vaccine: Pediatrics (0 to 5 Years) and At-Risk Patients (6 to 64 Years) (2 - PCV) 11/09/2021    INFLUENZA VACCINE (1) 09/01/2023       Will continue to work on this    Care Plans  Care Plan: Substance Use       Problem: Substance Use       Goal: Improve substance use status       This Visit's Progress: On track                            Intervention/Education provided during outreach: Continue meds as prescribed.  Encouraged his attendance at outpatient treatment.     Outreach Frequency: 2 weeks, more frequently as needed        Plan:   No change in treatment plan at this time.  Care Coordinator will follow up in 2 weeks, sooner if he has concerns.    Jyothi Fowler RN-BSN, [facility] Care Coordinator  [phone] opt. #3

## 2023-11-03 ENCOUNTER — PATIENT OUTREACH (OUTPATIENT)
Dept: CARE COORDINATION | Facility: OTHER | Age: 23
End: 2023-11-03

## 2023-11-03 NOTE — PROGRESS NOTES
Clinic Care Coordination Contact  Care Team Conversations    [place] is still not active at this time.  CC sent him a text message informing him of this and suggested he call the county to see what that status is.  Provided him with phone number to call - [phone].    Jyothi Fowler RN-BSN, [facility] Care Coordinator  [phone] opt. #3

## 2023-11-13 ENCOUNTER — PATIENT OUTREACH (OUTPATIENT)
Dept: CARE COORDINATION | Facility: OTHER | Age: 23
End: 2023-11-13

## 2023-11-13 NOTE — PROGRESS NOTES
Clinic Care Coordination Contact  Care Team Conversations    CC sent Nnamdi a text message this date reminding him of his appointment tomorrow and to arrive at 1:15PM.    Jyothi Fowler RN-BSN, [facility] Care Coordinator  [phone] opt. #3

## 2023-11-13 NOTE — PROGRESS NOTES
Assessment & Plan     Opioid use disorder  Doing well. Discussed it would be beneficial to stay on suboxone for awhile to help prevent relapse   [place] PDMP reviewed and appropriate    UDS appropriate  - Urine Drug Screen Buprenorphine Urine Qualitative [id number], Ethanol Urine Qualitative LNJ284, Methadone Urine Qualitative FZL3462, Oxycodone Urine Qualitative RHW9364, Creatinine Urine Random QXZ809  - buprenorphine HCl-naloxone HCl (SUBOXONE) 8-2 MG per film; Place 1.5 Film under the tongue daily    [place]  No urinary symptoms. Video showed substance consistent with mucus   - encourage hydration with water     6}     See Patient Instructions    Next visit 12/12/2023    Althea Felix MD  [facility] - [place] Coto is a 23 year old, presenting for the following health issues:  Recheck Medication        11/14/2023     1:12 PM   Additional Questions   Roomed by Lisa Julien   Accompanied by none         11/14/2023     1:12 PM   Patient Reported Additional Medications   Patient reports taking the following new medications none       HPI       He is currently taking 4/1 mg of buprenorphine 3 times daily.   Last fill 10.31.23 #42.    Status Since Last Visit:  Have you used any opioids since your last visit?: no use since last visit  Do you feel that your dose of suboxone is too high or too low? Adequate  Have there been cravings for opioids? No   Any withdrawal symptoms?  None     Any side effects from the medication? None  Any alcohol use? None  Any other recreational drug use? THC     Precipitating Factors:  Triggers have been: non-existent   Other Supports:  Do you attend counseling or meet with a therapist? Yes  Do you attend NA or AA meetings? Yes  Do you have/meet with a sponsor? No  Family and support systems have been: Helpful  What other goals have you been working on (job, family, relationships, etc)? Attending all scheduled appointments  No use or desire to use  Has  gone a day w/o suboxone d/t not thinking about taking the medication. Nnamdi would like to get off suboxone as soon as possible  Taking 3 films at one time  - would like to switch to 8/2mg films - take 1.5 films every day  Has been applying to [employer]   Went to [facility] today and filled out his MA application  -there was an error on his previous application  Urine -white stringy things. Video showed substance consistent with mucus             4/7/2016    10:08 AM 10/16/2023    12:49 PM   PHQ-9 SCORE   PHQ-9 Total Score MyChart  6 (Mild depression)   PHQ-9 Total Score 10 6            No data to display              PDMP Review         Value Time User    State PDMP site checked  Yes 10/31/2023  1:03 PM Althea Felix MD                  Review of Systems   Constitutional:  Negative for chills and fever.   HENT:  Negative for congestion.    Respiratory:  Negative for shortness of breath and wheezing.    Cardiovascular:  Negative for chest pain and palpitations.   Gastrointestinal:  Negative for abdominal pain.   Genitourinary:  Negative for difficulty urinating and penile discharge.   Psychiatric/Behavioral:  Negative for mood changes.           Objective    /69 (BP Location: Left arm, Patient Position: Sitting, Cuff Size: Adult Regular)   Pulse 87   Temp 97.6  F (36.4  C) (Tympanic)   Wt 83.2 kg (183 lb 6.4 oz)   SpO2 93%   BMI 23.55 kg/m    Body mass index is 23.55 kg/m .  Physical Exam  Constitutional:       General: He is not in acute distress.     Appearance: He is not ill-appearing.   Cardiovascular:      Rate and Rhythm: Normal rate and regular rhythm.      Pulses: Normal pulses.      Heart sounds: No murmur heard.  Pulmonary:      Effort: Pulmonary effort is normal. No respiratory distress.      Breath sounds: No wheezing or rales.   Neurological:      Mental Status: He is alert.   Psychiatric:         Mood and Affect: Mood normal.          Results for orders placed or performed in visit on  11/14/23 (from the past 24 hour(s))   Urine Drug Screen Buprenorphine Urine Qualitative [id number], Ethanol Urine Qualitative DIC857, Methadone Urine Qualitative HLP6456, Oxycodone Urine Qualitative MKM1277, Creatinine Urine Random PJW800    Narrative    The following orders were created for panel order Urine Drug Screen Buprenorphine Urine Qualitative [id number], Ethanol Urine Qualitative IJV285, Methadone Urine Qualitative VQY6689, Oxycodone Urine Qualitative JEP2546, Creatinine Urine Random UJR475.  Procedure                               Abnormality         Status                     ---------                               -----------         ------                     Urine Drug Screen Panel[[id number]]      Abnormal            Final result               Buprenorphine Urine, Rogelio...[[id number]]  Abnormal            Final result               Ethanol urine[[id number]]                Normal              Final result               Methadone Qual Urine[[id number]]         Normal              Final result               Oxycodone Urine, Qualita...[[id number]]  Normal              Final result               Creatinine random urine[[id number]]                          In process                   Please view results for these tests on the individual orders.   Urine Drug Screen Panel   Result Value Ref Range    Amphetamines Urine Screen Negative Screen Negative    Barbituates Urine Screen Negative Screen Negative    Benzodiazepine Urine Screen Negative Screen Negative    Cannabinoids Urine Screen Positive (A) Screen Negative    Cocaine Urine Screen Negative Screen Negative    Fentanyl Qual Urine Screen Negative Screen Negative    Opiates Urine Screen Negative Screen Negative    PCP Urine Screen Negative Screen Negative   Buprenorphine Urine, Qualitative   Result Value Ref Range    Buprenorphine Qual Urine Screen Positive (A) Screen Negative   Ethanol urine   Result Value Ref Range    Ethanol Urine Screen Negative Screen Negative    Methadone Qual Urine   Result Value Ref Range    Methadone Urine Screen Negative Screen Negative   Oxycodone Urine, Qualitative   Result Value Ref Range    Oxycodone Urine Screen Negative Screen Negative

## 2023-11-14 ENCOUNTER — APPOINTMENT (OUTPATIENT)
Dept: LAB | Facility: OTHER | Age: 23
End: 2023-11-14
Payer: MEDICAID

## 2023-11-14 ENCOUNTER — PATIENT OUTREACH (OUTPATIENT)
Dept: CARE COORDINATION | Facility: OTHER | Age: 23
End: 2023-11-14

## 2023-11-14 ENCOUNTER — OFFICE VISIT (OUTPATIENT)
Dept: FAMILY MEDICINE | Facility: OTHER | Age: 23
End: 2023-11-14
Attending: FAMILY MEDICINE
Payer: MEDICAID

## 2023-11-14 VITALS
OXYGEN SATURATION: 93 % | SYSTOLIC BLOOD PRESSURE: 115 MMHG | HEART RATE: 87 BPM | TEMPERATURE: 97.6 F | BODY MASS INDEX: 23.55 KG/M2 | DIASTOLIC BLOOD PRESSURE: 69 MMHG | WEIGHT: 183.4 LBS

## 2023-11-14 DIAGNOSIS — F11.90 OPIOID USE DISORDER: Primary | ICD-10-CM

## 2023-11-14 DIAGNOSIS — Q63.1 HORSESHOE KIDNEY: ICD-10-CM

## 2023-11-14 LAB
AMPHETAMINES UR QL SCN: ABNORMAL
BARBITURATES UR QL SCN: ABNORMAL
BENZODIAZ UR QL SCN: ABNORMAL
BUPRENORPHINE UR QL: ABNORMAL
BZE UR QL SCN: ABNORMAL
CANNABINOIDS UR QL SCN: ABNORMAL
ETHANOL UR QL SCN: NORMAL
FENTANYL UR QL: ABNORMAL
METHADONE UR QL SCN: NORMAL
OPIATES UR QL SCN: ABNORMAL
OXYCODONE UR QL: NORMAL
PCP QUAL URINE (ROCHE): ABNORMAL

## 2023-11-14 PROCEDURE — 80307 DRUG TEST PRSMV CHEM ANLYZR: CPT | Mod: ZL

## 2023-11-14 PROCEDURE — 99213 OFFICE O/P EST LOW 20 MIN: CPT | Performed by: FAMILY MEDICINE

## 2023-11-14 PROCEDURE — G0463 HOSPITAL OUTPT CLINIC VISIT: HCPCS | Performed by: FAMILY MEDICINE

## 2023-11-14 PROCEDURE — 82570 ASSAY OF URINE CREATININE: CPT | Mod: ZL,XU

## 2023-11-14 RX ORDER — BUPRENORPHINE AND NALOXONE 8; 2 MG/1; MG/1
1.5 FILM, SOLUBLE BUCCAL; SUBLINGUAL DAILY
Qty: 45 EACH | Refills: 0 | Status: SHIPPED | OUTPATIENT
Start: 2023-11-14 | End: 2023-12-07

## 2023-11-14 ASSESSMENT — ENCOUNTER SYMPTOMS
WHEEZING: 0
DIFFICULTY URINATING: 0
ABDOMINAL PAIN: 0
PALPITATIONS: 0
CHILLS: 0
FEVER: 0
SHORTNESS OF BREATH: 0

## 2023-11-14 ASSESSMENT — PAIN SCALES - GENERAL: PAINLEVEL: NO PAIN (0)

## 2023-11-14 NOTE — PROGRESS NOTES
Clinic Care Coordination Contact  Follow Up Progress Note      Assessment:  met with Nnamdi prior to him seeing Dr. Felix this date.  He is doing very well in recovery.  Suboxone dose is adequate and would like to continue on current dose.  He is taking 3 4/1mg films at once in the AM.  He would like to switch to 8/2mg films, as the 4/1mg films are quite large.  Dr. Felix is OK with this.  Informed him to cut the films with dry scissors and dry hands.  Keep 1/2 film in foil pack in safe location.  [facility]  His first application had an error on it, so [facility] to [facility] out another MA application.  Continues outpatient treatment.  No thoughts or desire to use opioids.  Commended him on this.     Care Gaps:    Health Maintenance Due   Topic Date Due    COVID-19 Vaccine (1) Never done    YEARLY PREVENTIVE VISIT  04/07/2017    HEPATITIS A IMMUNIZATION (1 of 2 - Risk 2-dose series) Never done    Pneumococcal Vaccine: Pediatrics (0 to 5 Years) and At-Risk Patients (6 to 64 Years) (2 - PCV) 11/09/2021    INFLUENZA VACCINE (1) 09/01/2023       Will continue to work on these    Care Plans  Care Plan: Substance Use       Problem: Substance Use       Goal: Improve substance use status       This Visit's Progress: On track                            Intervention/Education provided during outreach: Continue meds as prescribed.  [facility].       Outreach Frequency: monthly, more frequently as needed        Plan:   Dr. Felix did switch him to the 8/2mg films - take 1.5 films daily.  Same TDD.  Care Coordinator will follow up in 4 weeks, sooner if he has concerns.    Jyothi Fowler, RN-BSN, [facility] Care Coordinator  [phone] opt. #3

## 2023-11-15 ENCOUNTER — HOSPITAL ENCOUNTER (EMERGENCY)
Facility: HOSPITAL | Age: 23
Discharge: HOME | End: 2023-11-15
Attending: NURSE PRACTITIONER | Admitting: NURSE PRACTITIONER
Payer: MEDICAID

## 2023-11-15 VITALS
HEART RATE: 91 BPM | DIASTOLIC BLOOD PRESSURE: 60 MMHG | OXYGEN SATURATION: 96 % | TEMPERATURE: 98 F | RESPIRATION RATE: 20 BRPM | SYSTOLIC BLOOD PRESSURE: 110 MMHG

## 2023-11-15 DIAGNOSIS — Z11.52 ENCOUNTER FOR SCREENING LABORATORY TESTING FOR COVID-19 VIRUS: Primary | ICD-10-CM

## 2023-11-15 LAB
CREAT UR-MCNC: 246.4 MG/DL
FLUAV RNA SPEC QL NAA+PROBE: NEGATIVE
FLUBV RNA RESP QL NAA+PROBE: NEGATIVE
RSV RNA SPEC NAA+PROBE: NEGATIVE
SARS-COV-2 RNA RESP QL NAA+PROBE: NEGATIVE

## 2023-11-15 PROCEDURE — C9803 HOPD COVID-19 SPEC COLLECT: HCPCS

## 2023-11-15 PROCEDURE — 87637 SARSCOV2&INF A&B&RSV AMP PRB: CPT | Performed by: NURSE PRACTITIONER

## 2023-11-15 PROCEDURE — G0463 HOSPITAL OUTPT CLINIC VISIT: HCPCS

## 2023-11-15 PROCEDURE — 99212 OFFICE O/P EST SF 10 MIN: CPT | Performed by: NURSE PRACTITIONER

## 2023-11-15 ASSESSMENT — ENCOUNTER SYMPTOMS
SORE THROAT: 0
EYE DISCHARGE: 0
NAUSEA: 0
COUGH: 0
DIARRHEA: 0
SHORTNESS OF BREATH: 0
RHINORRHEA: 0
EYE REDNESS: 0
MYALGIAS: 0
FEVER: 0
PSYCHIATRIC NEGATIVE: 1
HEADACHES: 0
CHILLS: 0
VOMITING: 0

## 2023-11-15 NOTE — ED PROVIDER NOTES
History     Chief Complaint   Patient presents with    URI     HPI  Nnamdi Weeks is a 23 year old male who presents to urgent care today ambulatory requesting a COVID test due to exposure.  Denies any current symptoms.  Denies any fever, chills, nausea, vomiting, diarrhea, shortness of breath or chest pain.  No rashes.  Staying hydrated.  No other concerns.    Allergies:  Allergies   Allergen Reactions    Other [No Clinical Screening - See Comments]      Environmental allergies      Seasonal Allergies      Grass, ragweed, dust     Methylphenidate Rash and Hives     At patch site; Daytrana    Rash at patch site         Problem List:    Patient Active Problem List    Diagnosis Date Noted    Opioid abuse (H) 10/06/2023     Priority: Medium    Stimulant abuse (H) 07/23/2023     Priority: Medium    Bipolar disorder (H) 07/23/2023     Priority: Medium    Kidney stone 01/17/2023     Priority: Medium    Acute cystitis 01/17/2023     Priority: Medium    Bipolar I disorder (H) 04/07/2016     Priority: Medium    Attention deficit hyperactivity disorder (ADHD), predominantly inattentive type 04/07/2016     Priority: Medium    Amphetamine abuse (H) 04/11/2015     Priority: Medium    Cannabis use disorder 04/11/2015     Priority: Medium    Opioid use disorder, severe, dependence (H) 10/20/2014     Priority: Medium    Conduct disorder 10/20/2014     Priority: Medium    Hearing deficit, bilateral 09/04/2014     Priority: Medium    Dysthymia 12/13/2012     Priority: Medium    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/13/2012     Priority: Medium    Irritable bowel syndrome 12/13/2012     Priority: Medium     Formatting of this note might be different from the original. Biopsies negative for IBD      Lactose intolerance 12/13/2012     Priority: Medium    Allergic rhinitis due to pollen 07/14/2011     Priority: Medium        Past Medical History:    Past Medical History:   Diagnosis Date    Abdominal pain, unspecified site 11/14/2008    ADHD  (attention deficit hyperactivity disorder) 12/13/2012    Amphetamine user     Anxiety disorder 03/19/2015    Asthma,unspecified type, unspecified 05/25/2005    Bronchitis, not specified as acute or chronic 03/24/2005    Chemical dependency (H) 10/20/2014    Conduct disorder 10/20/2014    Deviated nasal septum     Diarrhea 09/27/2005    Disruptive behavior disorder 03/19/2015    Hearing deficit, bilateral 09/04/2014    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/13/2012    IBS (irritable bowel syndrome)     Intermittent explosive disorder     Lactose intolerance 12/13/2012    OCD (obsessive compulsive disorder) 03/09/2015    ODD (oppositional defiant disorder) 03/09/2015    Other orthopedic aftercare(V54.89) 10/08/2004    Polyphagia(783.6) 12/07/2007    PTSD (post-traumatic stress disorder) 03/19/2015    Routine infant or child health check 09/12/2007    Sensorineural hearing loss 11/03/2004       Past Surgical History:    Past Surgical History:   Procedure Laterality Date    ADENOIDECTOMY      COMBINED CYSTOSCOPY, URETEROSCOPY, LASER HOLMIUM LITHOTRIPSY URETER(S) Left 1/18/2023    Procedure: Left ureteroscopy with laser lithotripsy and stent placement;  Surgeon: Oli Ye MD;  Location: [facility]    TONSILLECTOMY         Family History:    Family History   Problem Relation Age of Onset    Allergies Sister     Allergies Mother     Asthma Sister     Heart Disease Maternal Grandmother         disease    Schizophrenia Brother        Social History:  Marital Status:  Single [1]  Social History     Tobacco Use    Smoking status: Every Day     Types: Vaping Device    Smokeless tobacco: Former    Tobacco comments:     no passive exposure   Substance Use Topics    Alcohol use: Yes     Comment: occasional    Drug use: Yes     Types: Marijuana        Medications:    buprenorphine HCl-naloxone HCl (SUBOXONE) 8-2 MG per film  divalproex sodium delayed-release (DEPAKOTE) 250 MG DR tablet      Review of Systems   Constitutional:  Negative for chills and  fever.   HENT:  Negative for congestion, ear pain, rhinorrhea and sore throat.    Eyes:  Negative for discharge and redness.   Respiratory:  Negative for cough and shortness of breath.    Cardiovascular:  Negative for chest pain.   Gastrointestinal:  Negative for diarrhea, nausea and vomiting.   Genitourinary:  Negative for decreased urine volume.   Musculoskeletal:  Negative for myalgias.   Skin:  Negative for rash.   Neurological:  Negative for headaches.   Psychiatric/Behavioral: Negative.       Physical Exam   BP: 110/60  Pulse: 91  Temp: 98  F (36.7  C)  Resp: 20  SpO2: 96 %    Physical Exam  Vitals and nursing note reviewed.   Constitutional:       General: He is not in acute distress.     Appearance: Normal appearance. He is not ill-appearing or toxic-appearing.   HENT:      Right Ear: Tympanic membrane, ear canal and external ear normal.      Left Ear: Tympanic membrane, ear canal and external ear normal.      Nose: Nose normal.      Mouth/Throat:      Mouth: Mucous membranes are moist.      Pharynx: Oropharynx is clear.   Cardiovascular:      Rate and Rhythm: Normal rate and regular rhythm.      Pulses: Normal pulses.      Heart sounds: Normal heart sounds.   Pulmonary:      Effort: Pulmonary effort is normal.      Breath sounds: Normal breath sounds.   Abdominal:      General: Bowel sounds are normal.      Palpations: Abdomen is soft.      Tenderness: There is no abdominal tenderness.   Skin:     General: Skin is warm and dry.   Neurological:      Mental Status: He is alert.   Psychiatric:         Mood and Affect: Mood normal.       ED Course     No results found for this or any previous visit (from the past 24 hour(s)).    Medications - No data to display    Assessments & Plan (with Medical Decision Making)     I have reviewed the nursing notes.    I have reviewed the findings, diagnosis, plan and need for follow up with the patient.  (Z11.52) Encounter for screening laboratory testing for COVID-19 virus   (primary encounter diagnosis)  Plan:   Patient ambulatory with a nontoxic appearance.  Encounter for COVID test, asymptomatic.  Lungs clear throughout.  No signs of otitis media or strep.  No abdominal tenderness.  Staying hydrated.  No rashes.  COVID test pending, will notify of results via telephone once it finalizes.  Patient to follow-up with primary care provider or return to urgent care/ED with any worsening in condition or additional concerns.  Patient in agreement with treatment plan.    Discharge Medication List as of 11/15/2023  5:39 PM        Final diagnoses:   Encounter for screening laboratory testing for COVID-19 virus     11/15/2023   [facility]       Sallie Aguiar, FIONA  11/15/23 [id number]

## 2023-11-15 NOTE — ED TRIAGE NOTES
Pt presents with c/o having covid exposure and has new born at home so wanted testing to make sure does not have covid   Denies any s/x   Exposed 3 days ago

## 2023-12-07 DIAGNOSIS — F11.90 OPIOID USE DISORDER: ICD-10-CM

## 2023-12-07 RX ORDER — BUPRENORPHINE HYDROCHLORIDE, NALOXONE HYDROCHLORIDE 8; 2 MG/1; MG/1
1 FILM, SOLUBLE BUCCAL; SUBLINGUAL 2 TIMES DAILY
Qty: 15 EACH | Refills: 0 | Status: SHIPPED | OUTPATIENT
Start: 2023-12-07 | End: 2023-12-15

## 2023-12-07 NOTE — TELEPHONE ENCOUNTER
Suboxone    - has increased to 8/2mg BID since last visit - 1 week pended  Has active [place] insurance now!  Last Written Prescription Date:  11.14.23  Last Fill Quantity: #45,   # refills: 0  Last Office Visit: 11.14.23.  Future Office visit:    Next 5 appointments (look out 90 days)      Dec 15, 2023  9:00 AM  (Arrive by 8:45 AM)  SHORT with Althea Felix MD  [facility] ([facility] [place] ) [address]  [place] [address]  [phone]             Routing refill request to provider for review/approval because:  Drug not on the [facility], P or  Health refill protocol or controlled substance

## 2023-12-14 ENCOUNTER — PATIENT OUTREACH (OUTPATIENT)
Dept: CARE COORDINATION | Facility: OTHER | Age: 23
End: 2023-12-14

## 2023-12-14 NOTE — PROGRESS NOTES
Clinic Care Coordination Contact  Care Team Conversations    CC sent Nnamdi a text message this date reminding him of his appointment tomorrow and to arrive at 8:45AM.    Jyothi Fowler RN-BSN, [facility] Care Coordinator  [phone] opt. #3

## 2023-12-15 ENCOUNTER — PATIENT OUTREACH (OUTPATIENT)
Dept: CARE COORDINATION | Facility: OTHER | Age: 23
End: 2023-12-15

## 2023-12-15 DIAGNOSIS — F11.90 OPIOID USE DISORDER: ICD-10-CM

## 2023-12-15 RX ORDER — BUPRENORPHINE HYDROCHLORIDE, NALOXONE HYDROCHLORIDE 8; 2 MG/1; MG/1
1 FILM, SOLUBLE BUCCAL; SUBLINGUAL 2 TIMES DAILY
Qty: 15 EACH | Refills: 0 | Status: SHIPPED | OUTPATIENT
Start: 2023-12-15 | End: 2023-12-21

## 2023-12-15 NOTE — TELEPHONE ENCOUNTER
Suboxone      Last Written Prescription Date:  12.9.23  Last Fill Quantity: #15,   # refills: 0  Last Office Visit: 11.14.23  Future Office visit:    Next 5 appointments (look out 90 days)      Dec 21, 2023  9:30 AM  (Arrive by 9:15 AM)  SHORT with Althea Felix MD  [facility] ([facility] ) [address]  [place] [address]  [phone]             Routing refill request to provider for review/approval because:  Drug not on the [facility], [facility] or [facility] refill protocol or controlled substance

## 2023-12-15 NOTE — PROGRESS NOTES
Received response from Nnamdi.  He brought the kids to school this AM and fell back asleep.  He apologize for missing this appointment.  Did reschedule him for December 21, arrive at 9:15AM.    Jyothi Fowler RN-BSN, [facility] Care Coordinator  [phone] opt. #3

## 2023-12-15 NOTE — PROGRESS NOTES
Clinic Care Coordination Contact  Care Team Conversations    Nnamdi was a no show for his appointment with Dr. Felix this date.  CC sent him a text message informing him of this and offered to reschedule this appointment.  Will wait for a response.    Jyothi Fowler RN-BSN, [facility] Care Coordinator  [phone] opt. #3

## 2023-12-20 ENCOUNTER — PATIENT OUTREACH (OUTPATIENT)
Dept: CARE COORDINATION | Facility: OTHER | Age: 23
End: 2023-12-20

## 2023-12-20 NOTE — PROGRESS NOTES
Clinic Care Coordination Contact  Care Team Conversations    CC sent Nnamdi a text message this date reminding him of his appointment tomorrow and to arrive at 9:15AM.    Jyothi Fowler RN-BSN, [facility] Care Coordinator  [phone] opt. #3

## 2023-12-21 ENCOUNTER — PATIENT OUTREACH (OUTPATIENT)
Dept: CARE COORDINATION | Facility: OTHER | Age: 23
End: 2023-12-21

## 2023-12-21 DIAGNOSIS — F11.90 OPIOID USE DISORDER: ICD-10-CM

## 2023-12-21 RX ORDER — BUPRENORPHINE HYDROCHLORIDE, NALOXONE HYDROCHLORIDE 8; 2 MG/1; MG/1
1 FILM, SOLUBLE BUCCAL; SUBLINGUAL 2 TIMES DAILY
Qty: 10 EACH | Refills: 0 | Status: SHIPPED | OUTPATIENT
Start: 2023-12-21 | End: 2023-12-29

## 2023-12-21 NOTE — TELEPHONE ENCOUNTER
Suboxone  #10 pended to get him to his appt next Friday.     Last Written Prescription Date:  12.18.23  Last Fill Quantity: #15,   # refills: 0  Last Office Visit: 11.14.23  Future Office visit:    Next 5 appointments (look out 90 days)      Dec 29, 2023  3:00 PM  (Arrive by 2:45 PM)  SHORT with Althea Felix MD  [facility] ([facility] [place] ) [address] [place] [address] [place]  [place] [address]  [phone]             Routing refill request to provider for review/approval because:  Drug not on the [facility], P or  Health refill protocol or controlled substance

## 2023-12-21 NOTE — PROGRESS NOTES
Clinic Care Coordination Contact  Care Team Conversations    Nnamdi was a no show for his appointment this date with Dr. Felix.  CC sent him a reminder text yesterday and then again this AM reminding him of this appointment.  CC has not received a response.  Will wait to hear from him to get him another appointment.    Jyothi Fowler, RN-BSN, [facility] Care Coordinator  [phone] opt. #3

## 2023-12-28 ENCOUNTER — PATIENT OUTREACH (OUTPATIENT)
Dept: CARE COORDINATION | Facility: OTHER | Age: 23
End: 2023-12-28

## 2023-12-28 NOTE — PROGRESS NOTES
Assessment & Plan     Opioid use disorder  Stable  [place] PDMP reviewed and appropriate    - SUBOXONE 8-2 MG per film; Place 1 Film under the tongue 2 times daily  - SUBOXONE 4-1 MG per film; Place 1 Film under the tongue daily  - NARCAN 4 MG/0.1ML nasal spray; Spray 1 spray (4 mg) into one nostril alternating nostrils as needed for opioid reversal every 2-3 minutes until assistance arrives    Bipolar I disorder (H)  - Adult Mental Health  Referral; Future - [facility]     Pain of left hand  Xray with slight foreshortening of left fifth metacarpal  - diclofenac (VOLTAREN) 1 % topical gel; Apply 2 g topically 4 times daily         See Patient Instructions    Next visit 1/26/2024    Althea Felix MD  [facility] - ALEXANDER Coto is a 23 year old, presenting for the following health issues:  Recheck Medication        12/29/2023     2:49 PM   Additional Questions   Roomed by Lisa Julien   Accompanied by none         12/29/2023     2:49 PM   Patient Reported Additional Medications   Patient reports taking the following new medications none       HPI       He is currently taking 8/2 mg of buprenorphine 2 times daily.   Last fill 12.24.23 #10.    Status Since Last Visit:  Have you used any opioids since your last visit?: no use since last visit  Do you feel that your dose of suboxone is too high or too low? Low - having cravings - has had to take an extra 4mg some days   Have there been cravings for opioids? Yes: at certain times - TV shows     Any withdrawal symptoms?  None     Any side effects from the medication?  None  Any alcohol use? None  Any other recreational drug use? THC    Precipitating Factors:  Triggers have been: moderate TV shows   Other Supports:  Do you attend counseling or meet with a therapist? No  Do you attend NA or AA meetings? No  Do you have/meet with a sponsor? No  Family and support systems have been: Helpful   What other goals have you been working on (job,  family, relationships, etc)? Was working at [employer] - past 2 months  Right now in between jobs  Applied at [employer] in [place] - hoping to start there soon   Left hand - broke it 1 year ago - fractured 5th metacarpal bone - still having some pain - last x-ray 2/23 - solid healing bone  Not attending out patient treatment since he got a job  Hasn't followed up with Arely Gannon at [facility] - would like psych referral - referral placed           4/7/2016    10:08 AM 10/16/2023    12:49 PM   PHQ-9 SCORE   PHQ-9 Total Score MyChart  6 (Mild depression)   PHQ-9 Total Score 10 6            No data to display              PDMP Review         Value Time User    State PDMP site checked  Yes 12/21/2023  5:03 PM Althea Felix MD              Review of Systems   Constitutional:  Negative for chills and fever.   HENT:  Negative for congestion.    Respiratory:  Negative for shortness of breath and wheezing.    Cardiovascular:  Negative for chest pain and palpitations.   Gastrointestinal:  Negative for abdominal pain.   Musculoskeletal:  Positive for arthralgias.          Objective    /65 (BP Location: Left arm, Patient Position: Sitting, Cuff Size: Adult Regular)   Pulse 73   Temp 98  F (36.7  C) (Tympanic)   Wt 78.6 kg (173 lb 4.8 oz)   SpO2 95%   BMI 22.25 kg/m    Body mass index is 22.25 kg/m .  Physical Exam  Constitutional:       General: He is not in acute distress.     Appearance: He is not ill-appearing.   Cardiovascular:      Rate and Rhythm: Normal rate and regular rhythm.      Pulses: Normal pulses.      Heart sounds: No murmur heard.  Pulmonary:      Effort: Pulmonary effort is normal. No respiratory distress.      Breath sounds: No wheezing or rales.   Neurological:      Mental Status: He is alert.   Psychiatric:         Mood and Affect: Mood normal.              Xray left hand (2/14/2023)  FINDINGS:  A prior proximal left fifth metacarpal fracture is seen,  associated with solid  healing bone. No evidence of recurrent fracture  is seen. Slight, less than 10% foreshortening of the left fifth  metacarpal is seen as compared to the right.     No acute fracture is identified in either hand. No significant  osteoarthritis is seen. No retained dense foreign body is identified.

## 2023-12-28 NOTE — PROGRESS NOTES
Clinic Care Coordination Contact  Care Team Conversations    CC sent Nnamdi a text message this date reminding him of his appointment tomorrow and to arrive at 2:45PM.    Jyothi Fowler RN-BSN, [facility] Care Coordinator  [phone] opt. #3

## 2023-12-29 ENCOUNTER — APPOINTMENT (OUTPATIENT)
Dept: LAB | Facility: OTHER | Age: 23
End: 2023-12-29
Payer: MEDICAID

## 2023-12-29 ENCOUNTER — PATIENT OUTREACH (OUTPATIENT)
Dept: CARE COORDINATION | Facility: OTHER | Age: 23
End: 2023-12-29

## 2023-12-29 ENCOUNTER — OFFICE VISIT (OUTPATIENT)
Dept: FAMILY MEDICINE | Facility: OTHER | Age: 23
End: 2023-12-29
Attending: FAMILY MEDICINE
Payer: COMMERCIAL

## 2023-12-29 VITALS
BODY MASS INDEX: 22.25 KG/M2 | DIASTOLIC BLOOD PRESSURE: 65 MMHG | SYSTOLIC BLOOD PRESSURE: 103 MMHG | WEIGHT: 173.3 LBS | HEART RATE: 73 BPM | TEMPERATURE: 98 F | OXYGEN SATURATION: 95 %

## 2023-12-29 DIAGNOSIS — M79.642 PAIN OF LEFT HAND: ICD-10-CM

## 2023-12-29 DIAGNOSIS — F11.90 OPIOID USE DISORDER: Primary | ICD-10-CM

## 2023-12-29 DIAGNOSIS — F31.9 BIPOLAR I DISORDER (H): ICD-10-CM

## 2023-12-29 PROCEDURE — 99213 OFFICE O/P EST LOW 20 MIN: CPT | Performed by: FAMILY MEDICINE

## 2023-12-29 PROCEDURE — G0463 HOSPITAL OUTPT CLINIC VISIT: HCPCS

## 2023-12-29 RX ORDER — BUPRENORPHINE HYDROCHLORIDE, NALOXONE HYDROCHLORIDE 8; 2 MG/1; MG/1
1 FILM, SOLUBLE BUCCAL; SUBLINGUAL 2 TIMES DAILY
Qty: 56 EACH | Refills: 0 | Status: SHIPPED | OUTPATIENT
Start: 2023-12-29 | End: 2024-02-08

## 2023-12-29 RX ORDER — NALOXONE HYDROCHLORIDE 4 MG/.1ML
4 SPRAY NASAL PRN
Qty: 0.2 ML | Refills: 1 | Status: SHIPPED | OUTPATIENT
Start: 2023-12-29 | End: 2024-04-04

## 2023-12-29 RX ORDER — BUPRENORPHINE HYDROCHLORIDE, NALOXONE HYDROCHLORIDE 4; 1 MG/1; MG/1
1 FILM, SOLUBLE BUCCAL; SUBLINGUAL DAILY
Qty: 28 EACH | Refills: 0 | Status: SHIPPED | OUTPATIENT
Start: 2023-12-29 | End: 2024-02-08

## 2023-12-29 ASSESSMENT — ENCOUNTER SYMPTOMS
WHEEZING: 0
ARTHRALGIAS: 1
PALPITATIONS: 0
SHORTNESS OF BREATH: 0
CHILLS: 0
FEVER: 0
ABDOMINAL PAIN: 0

## 2023-12-29 NOTE — PROGRESS NOTES
"Clinic Care Coordination Contact  Follow Up Progress Note      Assessment: CC attended office visit with pt and Dr. Felix this date. Nnamdi is overall doing very well.  Does report having some cravings - is getting triggered easily.  Admits to taking up to 20mg daily, which he feels is an adequate dose.  Is no longer in outpatient treatment or following up with Arely Gannon at [facility].  States he was working and was unable to attend - he was going voluntarily.  Has been out of his Depakote for \"a long time\".  Does not wish to restart at this time - interested in other options.  He is agreeable in seeing a psych provider here - referral placed.  Does have active [place] insurance now!  Narcan RX sent in.      Care Gaps:    Health Maintenance Due   Topic Date Due    COVID-19 Vaccine (1) Never done    YEARLY PREVENTIVE VISIT  04/07/2017    HEPATITIS A IMMUNIZATION (1 of 2 - Risk 2-dose series) Never done    Pneumococcal Vaccine: Pediatrics (0 to 5 Years) and At-Risk Patients (6 to 64 Years) (2 of 2 - PCV) 11/09/2021    INFLUENZA VACCINE (1) 09/01/2023       Will continue to work on these    Care Plans  Care Plan: Substance Use       Problem: Substance Use       Goal: Improve substance use status       This Visit's Progress: On track                            Intervention/Education provided during outreach: Continue meds as prescribed.  Did remind him that once the films are completely dissolved, he should rinse his mouth out to help prevent dental issues.  Narcan sent in.  Mental health referral placed.  You will get a phone call to schedule an appointment with one of our psych providers.   Encouraged him to continue to seek employment.       Outreach Frequency: monthly, more frequently as needed        Plan:   Dr. Felix increased Suboxone to 20/5mg daily.    Care Coordinator will follow up in 4 weeks, sooner if he has concerns.    Jyothi Fowler RN-BSN, [facility] Care Coordinator  [phone] opt. #3      "

## 2024-01-25 ENCOUNTER — PATIENT OUTREACH (OUTPATIENT)
Dept: CARE COORDINATION | Facility: OTHER | Age: 24
End: 2024-01-25

## 2024-01-25 NOTE — PROGRESS NOTES
Clinic Care Coordination Contact  Care Team Conversations    CC sent Nnamdi a text message this date reminding him of his appointment tomorrow and to arrive at 3:15PM.    Jyothi Fowler RN-BSN, [facility] Care Coordinator  [phone]

## 2024-01-26 ENCOUNTER — PATIENT OUTREACH (OUTPATIENT)
Dept: CARE COORDINATION | Facility: OTHER | Age: 24
End: 2024-01-26

## 2024-01-26 NOTE — PROGRESS NOTES
Clinic Care Coordination Contact  Care Team Conversations    CC received message from Nnamdi yesterday stating he is unable to come in for his appointment today, as he is helping his GF move.  He does start a new job next week, 7AM-3PM at [employer]).  CC requested that he let CC know what day he could come in next week after work.      Will wait for response and then send partial refill to Dr. Felix.    Suboxone last filled 12.29.23 #28, #56.    Jyothi Fowler, RN-BSN, [facility] Care Coordinator  [phone]

## 2024-02-08 DIAGNOSIS — F11.90 OPIOID USE DISORDER: ICD-10-CM

## 2024-02-08 RX ORDER — BUPRENORPHINE HYDROCHLORIDE, NALOXONE HYDROCHLORIDE 4; 1 MG/1; MG/1
1 FILM, SOLUBLE BUCCAL; SUBLINGUAL DAILY
Qty: 28 EACH | Refills: 0 | Status: SHIPPED | OUTPATIENT
Start: 2024-02-08 | End: 2024-03-07

## 2024-02-08 RX ORDER — BUPRENORPHINE HYDROCHLORIDE, NALOXONE HYDROCHLORIDE 8; 2 MG/1; MG/1
1 FILM, SOLUBLE BUCCAL; SUBLINGUAL 2 TIMES DAILY
Qty: 56 EACH | Refills: 0 | Status: SHIPPED | OUTPATIENT
Start: 2024-02-08 | End: 2024-03-07

## 2024-02-08 NOTE — TELEPHONE ENCOUNTER
Suboxone  - has been working  a lot lately - M-F 7AM-3PM at [employer].  Has stretched out his last fill.  Did schedule him for 4 weeks out, so he can plan transportation and make arrangements with work.    Last Written Prescription Date:  12.29.23  Last Fill Quantity: #28, #56,   # refills: 0  Last Office Visit: 12.29.23  Future Office visit:    Next 5 appointments (look out 90 days)      Mar 07, 2024  4:00 PM  (Arrive by 3:45 PM)  SHORT with Althea Felix MD  [facility] [place] ([facility] [place] ) [address]  [place] [address]  [phone]             Routing refill request to provider for review/approval because:  Drug not on the [facility], [facility] or [facility] refill protocol or controlled substance

## 2024-02-16 ENCOUNTER — PATIENT OUTREACH (OUTPATIENT)
Dept: CARE COORDINATION | Facility: OTHER | Age: 24
End: 2024-02-16

## 2024-02-16 NOTE — PROGRESS NOTES
"Clinic Care Coordination Contact  Follow Up Progress Note      Assessment: Received text from Nnamdi this AM informing CC that he started adding an additional 4mg to his prescribed Suboxone dose, which would be 24mg daily, which is the max dose.  States he \"has been triggered pretty hard\" lately - something to do with \" taking my cars cause they feel like it n sh**\".      CC thanked him for the update - but requested that he not increase the dose of his Suboxone on his own without reaching out - especially if he is struggling.  We have not seen him in over 6 weeks now, which we do understand that work and transportation at times are barriers, but self increasing the medication is not recommended. Would really like to get him into some sort of therapy/counseling to help him develop healthy coping skills, as his first instinct is to take more medications.      Informed him that we must see him in person and reminded him that his next appointment is on Thursday, March 7, arrive at 3:45PM.      Informed him to take his medication as prescribed and that his remaining films must last until his March 7 appointment.         Care Gaps:    Health Maintenance Due   Topic Date Due    COVID-19 Vaccine (1) Never done    YEARLY PREVENTIVE VISIT  04/07/2017    HEPATITIS A IMMUNIZATION (1 of 2 - Risk 2-dose series) Never done    Pneumococcal Vaccine: Pediatrics (0 to 5 Years) and At-Risk Patients (6 to 64 Years) (2 of 2 - PCV) 11/09/2021    INFLUENZA VACCINE (1) 09/01/2023       Will continue to work on these    Care Plans  Care Plan: Substance Use       Problem: Substance Use       Goal: Improve substance use status       This Visit's Progress: On track                          Care Plan: Medication Regimen       Problem: Medication Adherence       Goal: Consistently take Medications as Prescribed       This Visit's Progress: 50%    Note:     Barriers: increases meds on own, lack of coping  Strengths: honesty  Patient " expressed understanding of goal: Yes  Action steps to achieve this goal:  1. I will take my medications as prescribed  2. I will not increase the dose on my own   3. I will reach out to care team if I am struggling                                Intervention/Education provided during outreach: See above     Outreach Frequency: monthly, more frequently as needed        Plan:   See above  Care Coordinator will follow up in 3 weeks, sooner if he has concerns.    Jyothi Fowler RN-BSN, [facility] Care Coordinator  [phone]

## 2024-03-05 NOTE — PROGRESS NOTES
Assessment & Plan     Opioid use disorder  [place] reviewed and appropriate    stable  - Urine Drug Screen Buprenorphine Urine Qualitative [id number], Ethanol Urine Qualitative DMB858, Methadone Urine Qualitative [id number], Oxycodone Urine Qualitative EAW0089, Creatinine Urine Random IOD118  - c/w SUBOXONE 8-2 MG per film; Place 1 Film under the tongue 2 times daily  - c/w SUBOXONE 4-1 MG per film; Place 1 Film under the tongue daily    Bipolar disorder, current episode mixed, mild (H)  H/o doing well with depakote and would like to restart  - restart divalproex sodium extended-release (DEPAKOTE ER) 500 MG 24 hr tablet; Take 1 tablet (500 mg) by mouth daily  - will make appt with Jayla    Weight loss  UDS appropriate. S/p [facility] visit w/o concerning findings   Nnamdi was drinking a lot of RedBull which was resulting in stomach issues and decreased appetite which has improved since decreasing about of RedBull  Nnamdi is also very active  - continue to monitor weight, will hold off on colonoscopy / EGD for now     See Patient Instructions    Next visit 4/5/2024    Subjective   Nnamdi is a 23 year old, presenting for the following health issues:  Recheck Medication        3/7/2024    12:27 PM   Additional Questions   Roomed by Lisa Julien   Accompanied by none         3/7/2024    12:27 PM   Patient Reported Additional Medications   Patient reports taking the following new medications none     HPI       He is currently taking 20/5 mg of buprenorphine daily.   Last fill 2.9.24 #28, #56.    Status Since Last Visit:  Have you used any opioids since your last visit?: no use since last visit  Do you feel that your dose of suboxone is too high or too low? Adequate  Have there been cravings for opioids? No   Any withdrawal symptoms?  None     Any side effects from the medication?  None  Any alcohol use? None  Any other recreational drug use? THC    Precipitating Factors:  Triggers have been: mild - stressful  "situations -  took his car   Other Supports:  Do you attend counseling or meet with a therapist? No  Do you attend NA or AA meetings? No  Do you have/meet with a sponsor? No  Family and support systems have been: Helpful   What other goals have you been working on (job, family, relationships, etc)? Started a new job  Working full time - [employer]   Weight is down #20 since October 2023 - feels like since starting working - constantly moving around   Stomach pain \"is much better\"  - does not want to pursue GI referral at this time   Feels like this was due to high intake of Red Bull  Missed his appointment with Jayla Verde - will get him rescheduled   Would like to restart Depakote           4/7/2016    10:08 AM 10/16/2023    12:49 PM   PHQ-9 SCORE   PHQ-9 Total Score MyChart  6 (Mild depression)   PHQ-9 Total Score 10 6            No data to display              PDMP Review         Value Time User    State PDMP site checked  Yes 2/8/2024 10:43 AM Althea Felix MD          # weight loss  - cutting back on RedBull, no further issues with stomach pain   - working a lot   - ER [place] visit w/o concerning findings      Wt Readings from Last 4 Encounters:   03/07/24 75.8 kg (167 lb 1.6 oz)   12/29/23 78.6 kg (173 lb 4.8 oz)   11/14/23 83.2 kg (183 lb 6.4 oz)   10/31/23 85.2 kg (187 lb 12.8 oz)     # Mood  - generally starts out at depakote 500mg every day   - will work on getting an appt with Jayla           Objective    /74 (BP Location: Right arm, Patient Position: Sitting, Cuff Size: Adult Large)   Pulse 67   Temp 98.4  F (36.9  C) (Tympanic)   Resp 20   Ht 1.88 m (6' 2\")   Wt 75.8 kg (167 lb 1.6 oz)   SpO2 97%   BMI 21.45 kg/m    Body mass index is 21.45 kg/m .  Physical Exam  Constitutional:       General: He is not in acute distress.     Appearance: He is not ill-appearing.   Cardiovascular:      Rate and Rhythm: Normal rate and regular rhythm.      Pulses: Normal pulses.      " Heart sounds: No murmur heard.  Pulmonary:      Effort: Pulmonary effort is normal. No respiratory distress.      Breath sounds: No wheezing or rales.   Neurological:      Mental Status: He is alert.   Psychiatric:         Mood and Affect: Mood normal.          Results for orders placed or performed in visit on 03/07/24 (from the past 24 hour(s))   Urine Drug Screen Buprenorphine Urine Qualitative [id number], Ethanol Urine Qualitative OTO924, Methadone Urine Qualitative JKF4630, Oxycodone Urine Qualitative [id number], Creatinine Urine Random WZD655    Narrative    The following orders were created for panel order Urine Drug Screen Buprenorphine Urine Qualitative [id number], Ethanol Urine Qualitative HRB007, Methadone Urine Qualitative OIU2201, Oxycodone Urine Qualitative XAR2576, Creatinine Urine Random COO201.  Procedure                               Abnormality         Status                     ---------                               -----------         ------                     Urine Drug Screen Panel[[id number]]      Abnormal            Final result               Buprenorphine Urine, Rogelio...[[id number]]  Abnormal            Final result               Ethanol urine[[id number]]                Normal              Final result               Methadone Qual Urine[[id number]]         Normal              Final result               Oxycodone Urine, Qualita...[[id number]]  Normal              Final result               Creatinine random urine[[id number]]                          Final result                 Please view results for these tests on the individual orders.   Urine Drug Screen Panel   Result Value Ref Range    Amphetamines Urine Screen Negative Screen Negative    Barbituates Urine Screen Negative Screen Negative    Benzodiazepine Urine Screen Negative Screen Negative    Cannabinoids Urine Screen Positive (A) Screen Negative    Cocaine Urine Screen Negative Screen Negative    Fentanyl Qual Urine Screen Negative Screen  Negative    Opiates Urine Screen Negative Screen Negative    PCP Urine Screen Negative Screen Negative   Buprenorphine Urine, Qualitative   Result Value Ref Range    Buprenorphine Qual Urine Screen Positive (A) Screen Negative   Ethanol urine   Result Value Ref Range    Ethanol Urine Screen Negative Screen Negative   Methadone Qual Urine   Result Value Ref Range    Methadone Urine Screen Negative Screen Negative   Oxycodone Urine, Qualitative   Result Value Ref Range    Oxycodone Urine Screen Negative Screen Negative   Creatinine random urine   Result Value Ref Range    Creatinine Urine mg/dL 202.3 mg/dL           Signed Electronically by: Althea Felix MD

## 2024-03-06 ENCOUNTER — PATIENT OUTREACH (OUTPATIENT)
Dept: CARE COORDINATION | Facility: OTHER | Age: 24
End: 2024-03-06

## 2024-03-06 NOTE — PROGRESS NOTES
Clinic Care Coordination Contact  Care Team Conversations    CC sent Nnamdi a text message this date reminding him of his appointment tomorrow and to arrive at 12:45PM.    Jyothi Fowler RN-BSN, [facility] Care Coordinator  [phone]

## 2024-03-07 ENCOUNTER — APPOINTMENT (OUTPATIENT)
Dept: LAB | Facility: OTHER | Age: 24
End: 2024-03-07
Attending: FAMILY MEDICINE
Payer: COMMERCIAL

## 2024-03-07 ENCOUNTER — PATIENT OUTREACH (OUTPATIENT)
Dept: CARE COORDINATION | Facility: OTHER | Age: 24
End: 2024-03-07

## 2024-03-07 ENCOUNTER — OFFICE VISIT (OUTPATIENT)
Dept: FAMILY MEDICINE | Facility: OTHER | Age: 24
End: 2024-03-07
Attending: FAMILY MEDICINE
Payer: COMMERCIAL

## 2024-03-07 VITALS
TEMPERATURE: 98.4 F | HEART RATE: 67 BPM | DIASTOLIC BLOOD PRESSURE: 74 MMHG | RESPIRATION RATE: 20 BRPM | OXYGEN SATURATION: 97 % | WEIGHT: 167.1 LBS | HEIGHT: 74 IN | BODY MASS INDEX: 21.45 KG/M2 | SYSTOLIC BLOOD PRESSURE: 110 MMHG

## 2024-03-07 DIAGNOSIS — F31.61 BIPOLAR DISORDER, CURRENT EPISODE MIXED, MILD (H): ICD-10-CM

## 2024-03-07 DIAGNOSIS — F11.90 OPIOID USE DISORDER: Primary | ICD-10-CM

## 2024-03-07 DIAGNOSIS — R63.4 WEIGHT LOSS: ICD-10-CM

## 2024-03-07 LAB
AMPHETAMINES UR QL SCN: ABNORMAL
BARBITURATES UR QL SCN: ABNORMAL
BENZODIAZ UR QL SCN: ABNORMAL
BUPRENORPHINE UR QL: ABNORMAL
BZE UR QL SCN: ABNORMAL
CANNABINOIDS UR QL SCN: ABNORMAL
CREAT UR-MCNC: 202.3 MG/DL
ETHANOL UR QL SCN: NORMAL
FENTANYL UR QL: ABNORMAL
METHADONE UR QL SCN: NORMAL
OPIATES UR QL SCN: ABNORMAL
OXYCODONE UR QL: NORMAL
PCP QUAL URINE (ROCHE): ABNORMAL

## 2024-03-07 PROCEDURE — 80307 DRUG TEST PRSMV CHEM ANLYZR: CPT | Mod: ZL | Performed by: FAMILY MEDICINE

## 2024-03-07 PROCEDURE — 82570 ASSAY OF URINE CREATININE: CPT | Mod: ZL | Performed by: FAMILY MEDICINE

## 2024-03-07 PROCEDURE — 99214 OFFICE O/P EST MOD 30 MIN: CPT | Performed by: FAMILY MEDICINE

## 2024-03-07 PROCEDURE — G0463 HOSPITAL OUTPT CLINIC VISIT: HCPCS

## 2024-03-07 RX ORDER — DIVALPROEX SODIUM 500 MG/1
500 TABLET, EXTENDED RELEASE ORAL DAILY
Qty: 30 TABLET | Refills: 2 | Status: SHIPPED | OUTPATIENT
Start: 2024-03-07 | End: 2024-04-04

## 2024-03-07 RX ORDER — BUPRENORPHINE HYDROCHLORIDE, NALOXONE HYDROCHLORIDE 4; 1 MG/1; MG/1
1 FILM, SOLUBLE BUCCAL; SUBLINGUAL DAILY
Qty: 28 EACH | Refills: 0 | Status: SHIPPED | OUTPATIENT
Start: 2024-03-07 | End: 2024-03-11

## 2024-03-07 RX ORDER — DIVALPROEX SODIUM 250 MG/1
250 TABLET, DELAYED RELEASE ORAL 2 TIMES DAILY
Qty: 60 TABLET | Refills: 2 | Status: CANCELLED | OUTPATIENT
Start: 2024-03-07

## 2024-03-07 RX ORDER — BUPRENORPHINE HYDROCHLORIDE, NALOXONE HYDROCHLORIDE 8; 2 MG/1; MG/1
1 FILM, SOLUBLE BUCCAL; SUBLINGUAL 2 TIMES DAILY
Qty: 56 EACH | Refills: 0 | Status: SHIPPED | OUTPATIENT
Start: 2024-03-07 | End: 2024-03-11

## 2024-03-07 NOTE — COMMUNITY RESOURCES LIST (ENGLISH)
03/07/2024   [facility]  N/A  For questions about this resource list or additional care needs, please contact your primary care clinic or care manager.  Phone: [phone]   Email: N/A   Address: [address] [place] [id number]   Hours: N/A        Food and Nutrition       Food pantry  1  [facility] [employer] [facility] Distance: 2.42 miles      Pickup   [address] [place] [address]  Language: English  Hours: Mon - Sun Open 24 Hours  Fees: Free   Phone: (803) 323-3468 Email: contactus@[employer].org Website: http://www.[employer].org     2  [facility] [employer] Distance: 17.89 miles      Pickup   [address] [place] [address]  Language: English  Hours: Mon 9:00 AM - 11:00 AM , Tue 1:00 PM - 3:00 PM , Wed - Thu 9:00 AM - 11:00 AM  Fees: Free, Self Pay   Phone: (132) 994-8077 Email: marco@[facility].org Website: https://[facility].org/northern/[place]     SNAP application assistance  3  [facility] & [facility] Distance: 2.37 miles      In-Person, Phone/Virtual   [address] [place] [address]  Language: English  Hours: Mon - Fri 8:00 AM - 4:30 PM  Fees: Free   Phone: (491) 234-1717 Email: financialassistance@[facility] Website: https://www.[facility].gov/qoosyucnynd-h-k/public-health-human-services/economic-services-supports     [address] [place] [address] Economic Services & [facility] Distance: 17.06 miles      In-Person, Phone/Virtual   [address] [place] [address]  Language: English  Hours: Mon - Fri 8:00 AM - 4:30 PM  Fees: Free   Phone: (392) 552-4754 Website: https://www.[employer].gov/gacwvaesuht-k-s/public-health-human-services/economic-services-supports     Soup kitchen or free meals  5  [facility] [place] [employer] [facility]  [facility] Distance: 17.89 miles      Pickup   [address] [place] [address]  Language: English  Hours: Mon - Fri 4:00 PM - 4:45 PM  Fees: Free   Phone: (188) 655-7237 Email: marco@[facility] Website: https://[facility].org/northern/[place]          Important Numbers & Websites       Emergency Services   911  [facility] Services   311  Poison Control   (687) 132-7768  Suicide Prevention Lifeline   (136) 704-6615 (TALK)  Child Abuse Hotline   (809) 256-1632 (4-A-Child)  Sexual Assault Hotline   (637) 470-9440 (HOPE)  National Runaway Safeline   (500) 756-4190 (RUNAWAY)  All-Options Talkline   (173) 246-4198  Substance Abuse Referral   (622) 436-6293 (HELP)

## 2024-03-07 NOTE — PROGRESS NOTES
Clinic Care Coordination Contact  Follow Up Progress Note      Assessment: CC attended office visit with Nnamdi and Dr. Felix this date.  We have not seen Nnamdi since 12.29.23 due to conflicts with his work schedule and transportation issues.  He reports he has been doing very well in recovery.  There were a few days where he did take more of his Suboxone due to triggers - which he did reach out to CC.  CC instructed him to take his meds as prescribed until this appointment, since going up to 24mg daily would be the max dose for the medication.   He is understanding of this and he prefers to stay at 20mg daily at this time and he would like to restart his Depakote, which Dr. Felix sent in today.  He was unable to attend his appt with Jayla Verde CNP, due to work/transportation.  He would like to schedule an another appointment with her.  CC sent message to individual that schedules new patients with Jayla Verde CNP, requesting she contact him to get him another appointment.  Did have recent ER visit in [place] for unintentional weight loss and abdominal pain.  His abdominal pain has mostly subsided and does not want to pursue GI referral.  He thinks that these issues were caused by poor diet, large intake of Red Bull, new job, etc.  Will continue to monitor.      Care Gaps:    Health Maintenance Due   Topic Date Due    YEARLY PREVENTIVE VISIT  04/07/2017    HEPATITIS A IMMUNIZATION (1 of 2 - Risk 2-dose series) Never done    Pneumococcal Vaccine: Pediatrics (0 to 5 Years) and At-Risk Patients (6 to 64 Years) (2 of 2 - PCV) 11/09/2021    INFLUENZA VACCINE (1) 09/01/2023    COVID-19 Vaccine (1 - 2023-24 season) Never done       Will continue to work on these    Care Plans  Care Plan: Substance Use       Problem: Substance Use       Goal: Improve substance use status       This Visit's Progress: On track Recent Progress: On track                          Care Plan: Medication Regimen       Problem: Medication  Adherence       Goal: Consistently take Medications as Prescribed       This Visit's Progress: 50% Recent Progress: 50%    Note:     Barriers: increases meds on own, lack of coping  Strengths: honesty  Patient expressed understanding of goal: Yes  Action steps to achieve this goal:  1. I will take my medications as prescribed  2. I will not increase the dose on my own   3. I will reach out to care team if I am struggling                                Intervention/Education provided during outreach: Continue meds as prescribed.  Will have someone call you to get you another appointment with Jayla Verde CNP.  Continue to work on lifestyle changes in regards to your weight loss and abdominal pain.       Outreach Frequency: monthly, more frequently as needed        Plan:   Restart your Depakote as prescribed by Dr. Felix.  Reach out to CC if you have any concerns.  Care Coordinator will follow up in 4 weeks, sooner if he has concerns.    Jyothi Fowler RN-BSN, [facility] Care Coordinator  [phone]

## 2024-03-11 DIAGNOSIS — F11.90 OPIOID USE DISORDER: ICD-10-CM

## 2024-03-11 RX ORDER — BUPRENORPHINE HYDROCHLORIDE, NALOXONE HYDROCHLORIDE 4; 1 MG/1; MG/1
1 FILM, SOLUBLE BUCCAL; SUBLINGUAL DAILY
Qty: 28 EACH | Refills: 0 | Status: SHIPPED | OUTPATIENT
Start: 2024-03-11 | End: 2024-04-04

## 2024-03-11 RX ORDER — BUPRENORPHINE HYDROCHLORIDE, NALOXONE HYDROCHLORIDE 8; 2 MG/1; MG/1
1 FILM, SOLUBLE BUCCAL; SUBLINGUAL 2 TIMES DAILY
Qty: 56 EACH | Refills: 0 | Status: SHIPPED | OUTPATIENT
Start: 2024-03-11 | End: 2024-04-04

## 2024-03-11 NOTE — TELEPHONE ENCOUNTER
RXs were sent to [facility].   They were supposed to go to the [place] one.  New RXs pended.  Last fill on PDMP was 2.9.24.

## 2024-04-04 ENCOUNTER — OFFICE VISIT (OUTPATIENT)
Dept: FAMILY MEDICINE | Facility: OTHER | Age: 24
End: 2024-04-04
Attending: FAMILY MEDICINE
Payer: COMMERCIAL

## 2024-04-04 ENCOUNTER — PATIENT OUTREACH (OUTPATIENT)
Dept: CARE COORDINATION | Facility: OTHER | Age: 24
End: 2024-04-04

## 2024-04-04 ENCOUNTER — APPOINTMENT (OUTPATIENT)
Dept: LAB | Facility: OTHER | Age: 24
End: 2024-04-04
Payer: COMMERCIAL

## 2024-04-04 VITALS
SYSTOLIC BLOOD PRESSURE: 110 MMHG | DIASTOLIC BLOOD PRESSURE: 60 MMHG | WEIGHT: 165.8 LBS | OXYGEN SATURATION: 97 % | RESPIRATION RATE: 16 BRPM | BODY MASS INDEX: 20.62 KG/M2 | TEMPERATURE: 98.8 F | HEART RATE: 81 BPM | HEIGHT: 75 IN

## 2024-04-04 DIAGNOSIS — F31.61 BIPOLAR DISORDER, CURRENT EPISODE MIXED, MILD (H): ICD-10-CM

## 2024-04-04 DIAGNOSIS — R63.4 WEIGHT LOSS: ICD-10-CM

## 2024-04-04 DIAGNOSIS — F11.90 OPIOID USE DISORDER: Primary | ICD-10-CM

## 2024-04-04 LAB
AMPHETAMINES UR QL SCN: ABNORMAL
BARBITURATES UR QL SCN: ABNORMAL
BENZODIAZ UR QL SCN: ABNORMAL
BUPRENORPHINE UR QL: ABNORMAL
BZE UR QL SCN: ABNORMAL
CANNABINOIDS UR QL SCN: ABNORMAL
CREAT UR-MCNC: 119.2 MG/DL
ETHANOL UR QL SCN: NORMAL
FENTANYL UR QL: ABNORMAL
METHADONE UR QL SCN: NORMAL
OPIATES UR QL SCN: ABNORMAL
OXYCODONE UR QL: NORMAL
PCP QUAL URINE (ROCHE): ABNORMAL

## 2024-04-04 PROCEDURE — 99214 OFFICE O/P EST MOD 30 MIN: CPT | Performed by: FAMILY MEDICINE

## 2024-04-04 PROCEDURE — 80307 DRUG TEST PRSMV CHEM ANLYZR: CPT | Mod: ZL | Performed by: FAMILY MEDICINE

## 2024-04-04 PROCEDURE — 82570 ASSAY OF URINE CREATININE: CPT | Mod: ZL,XU | Performed by: FAMILY MEDICINE

## 2024-04-04 PROCEDURE — G0463 HOSPITAL OUTPT CLINIC VISIT: HCPCS

## 2024-04-04 RX ORDER — DIVALPROEX SODIUM 500 MG/1
500 TABLET, EXTENDED RELEASE ORAL DAILY
Qty: 30 TABLET | Refills: 5 | Status: SHIPPED | OUTPATIENT
Start: 2024-04-04 | End: 2024-10-01

## 2024-04-04 RX ORDER — ALUMINA, MAGNESIA, AND SIMETHICONE 2400; 2400; 240 MG/30ML; MG/30ML; MG/30ML
15 SUSPENSION ORAL
COMMUNITY
Start: 2024-02-23 | End: 2024-06-06

## 2024-04-04 RX ORDER — BUPRENORPHINE HYDROCHLORIDE, NALOXONE HYDROCHLORIDE 8; 2 MG/1; MG/1
1 FILM, SOLUBLE BUCCAL; SUBLINGUAL 2 TIMES DAILY
Qty: 60 EACH | Refills: 0 | Status: SHIPPED | OUTPATIENT
Start: 2024-04-04 | End: 2024-05-21

## 2024-04-04 RX ORDER — CEPHALEXIN 500 MG/1
CAPSULE ORAL
COMMUNITY
Start: 2024-03-14 | End: 2024-04-04

## 2024-04-04 RX ORDER — BUPRENORPHINE HYDROCHLORIDE, NALOXONE HYDROCHLORIDE 4; 1 MG/1; MG/1
1 FILM, SOLUBLE BUCCAL; SUBLINGUAL DAILY
Qty: 30 EACH | Refills: 0 | Status: SHIPPED | OUTPATIENT
Start: 2024-04-04 | End: 2024-05-21

## 2024-04-04 RX ORDER — NALOXONE HYDROCHLORIDE 4 MG/.1ML
4 SPRAY NASAL PRN
Qty: 0.2 ML | Refills: 1 | Status: SHIPPED | OUTPATIENT
Start: 2024-04-04

## 2024-04-04 ASSESSMENT — PAIN SCALES - GENERAL: PAINLEVEL: NO PAIN (0)

## 2024-04-04 ASSESSMENT — PATIENT HEALTH QUESTIONNAIRE - PHQ9: SUM OF ALL RESPONSES TO PHQ QUESTIONS 1-9: 0

## 2024-04-04 NOTE — PROGRESS NOTES
Clinic Care Coordination Contact  Care Team Conversations    CC sent Nnamdi a text message this date reminding him of his appointment tomorrow and to arrive at 3:30/3:45PM - due to his work schedule.  It is OK for him to check in late.    Jyothi Fowler RN-BSN, [facility] Care Coordinator  [phone]

## 2024-04-04 NOTE — PROGRESS NOTES
Clinic Care Coordination Contact  Follow Up Progress Note      Assessment: CC attended office visit with Nnamdi and Dr. Felix this date.  Nnamdi is doing well in recovery.  Suboxone dose is adequate and would like to continue on current dose.  Did not adjust his dose at all since last visit - commended him on this.  He has been busy with work - working full time - around 55 hours per week.  Did recently fracture one of his fingers at work - did go to ER and file work comp.  He is back working now - without restrictions.  Weight is down another 2# since last visit - but abdominal pain has subsided.  Does report at times - inability to afford food due to financial constraints.  CC offered to look for resources in the [place] Area - [employer] pant[employer], etc - he declined  -stated that wouldn't go there.  Does receive EBT and WIIC.  CC will look into resources and send him info, so at least he will have it if he changes his mind. Has not made an appointment with a mental health provider - does not want to leave work early.  Did restart Depakote last month - often forgets to take his daily dose. Did suggest that he set a reoccurring reminder on his phone - reminding him to take his medications.  CC will look into med management services in [place] and get back to him.  Does have an appointment on 4/8 with orthopedics for his chronic left hand pain in [place].  He does not want to keep that appointment.  CC provided him with phone number to call and cancel that appointment.  Highly encouraged him to cancel versus no show.  He stated that he will call to cancel.      Care Gaps:    Health Maintenance Due   Topic Date Due    YEARLY PREVENTIVE VISIT  04/07/2017    HEPATITIS A IMMUNIZATION (1 of 2 - Risk 2-dose series) Never done    Pneumococcal Vaccine: Pediatrics (0 to 5 Years) and At-Risk Patients (6 to 64 Years) (2 of 2 - PCV) 11/09/2021    INFLUENZA VACCINE (1) 09/01/2023    COVID-19 Vaccine (1 - 2023-24 season) Never done        Will continue to work on these    Care Plans  Care Plan: Substance Use       Problem: Substance Use       Goal: Improve substance use status       This Visit's Progress: 100% Recent Progress: On track                          Care Plan: Medication Regimen       Problem: Medication Adherence       Goal: Consistently take Medications as Prescribed       This Visit's Progress: 70% Recent Progress: 50%    Note:     Barriers: increases meds on own, lack of coping  Strengths: honesty  Patient expressed understanding of goal: Yes  Action steps to achieve this goal:  1. I will take my medications as prescribed  2. I will not increase the dose on my own   3. I will reach out to care team if I am struggling                                Intervention/Education provided during outreach: Take medications as prescribed.  CC will look into mental health providers in [place], as well as food resources and will get back to you.  Try to increase caloric intake during the day as you are able to - try boost or ensure drinks for added calories.  Did inform him that his [place] is due for renewal this month.  He states that he no longer needs this assistance, as he has active insurance.  Narcan sent.       Outreach Frequency: 6 weeks, more frequently as needed        Plan: No change in treatment plan at this time.     Care Coordinator will follow up in 6 weeks, sooner if he has concerns.    Jyothi Fowler, RN-BSN, [facility] Care Coordinator  [phone]

## 2024-04-04 NOTE — PROGRESS NOTES
Nnamdi asked if he would come in this date for an appointment.  CC switched him to today - April 4, arrive at 3:45PM.  He is happy and agreeable with this.    Jyothi Fowler, RN-BSN, [facility] Care Coordinator  [phone]

## 2024-04-04 NOTE — PROGRESS NOTES
Assessment & Plan     Opioid use disorder  Stable  [place] PDMP reviewed and appropriate    - Urine Drug Screen Buprenorphine Urine Qualitative [id number], Ethanol Urine Qualitative WBW283, Methadone Urine Qualitative WSH0021, Oxycodone Urine Qualitative HXM5893, Creatinine Urine Random LKN536  - NARCAN 4 MG/0.1ML nasal spray; Spray 1 spray (4 mg) into one nostril alternating nostrils as needed for opioid reversal every 2-3 minutes until assistance arrives  - SUBOXONE 4-1 MG per film; Place 1 Film under the tongue daily  - SUBOXONE 8-2 MG per film; Place 1 Film under the tongue 2 times daily    Bipolar disorder, current episode mixed, mild (H)  - c/w divalproex sodium extended-release (DEPAKOTE ER) 500 MG 24 hr tablet; Take 1 tablet (500 mg) by mouth daily    Weight loss  Stabilizing  Weight loss most likely d/t work, family life, and food insecurity        See Patient Instructions    Next visit 5/10/2024    Jae Coto is a 23 year old, presenting for the following health issues:  Recheck Medication        4/4/2024     3:51 PM   Additional Questions   Roomed by daylin gaytan lpn   Accompanied by self         4/4/2024     3:51 PM   Patient Reported Additional Medications   Patient reports taking the following new medications none     HPI     He is currently taking 20/5 mg of buprenorphine daily.   Last fill 3.8.24.    Status Since Last Visit:  Have you used any opioids since your last visit?: no use since last visit  Do you feel that your dose of suboxone is too high or too low? Adequate  Have there been cravings for opioids? No   Any withdrawal symptoms?  None     Any side effects from the medication?  None  Any alcohol use? None   Any other recreational drug use? THC     Precipitating Factors:  Triggers have been: non-existent   Other Supports:  Do you attend counseling or meet with a therapist? No   Do you attend NA or AA meetings? No  Do you have/meet with a sponsor? No  Family and support systems have  "been: Helpful   What other goals have you been working on (job, family, relationships, etc)? Working full time  Working 55 hours per week - [employer]   On 3/14/24 - crushed his finger at work - went to ER -   Ortho appt on 4/8 - doesn't want to see that provider - provided him with number to cancel   Stomach pain - subsided - still losing weight - states he is on his feet all day - nonstop for 15 hours           4/7/2016    10:08 AM 10/16/2023    12:49 PM 4/4/2024     3:55 PM   PHQ-9 SCORE   PHQ-9 Total Score MyChart  6 (Mild depression)    PHQ-9 Total Score 10 6 0            No data to display              PDMP Review         Value Time User    State PDMP site checked  Yes 3/11/2024  7:38 AM Althea Felix MD          # weight loss / bipolar    - mood is pretty good  - restarted depakote  - no history of burke  - dx during childhood   - has tried zoloft w/o benefit  - usually eat after work   - issues with money  - no issues with appetite   - very busy with work and life   - no blood in stool   - some food insecurity     Wt Readings from Last 4 Encounters:   04/04/24 75.2 kg (165 lb 12.8 oz)   03/07/24 75.8 kg (167 lb 1.6 oz)   12/29/23 78.6 kg (173 lb 4.8 oz)   11/14/23 83.2 kg (183 lb 6.4 oz)           Objective    /60 (BP Location: Left arm, Patient Position: Sitting, Cuff Size: Adult Regular)   Pulse 81   Temp 98.8  F (37.1  C) (Tympanic)   Resp 16   Ht 1.9 m (6' 2.8\")   Wt 75.2 kg (165 lb 12.8 oz)   SpO2 97%   BMI 20.83 kg/m    Body mass index is 20.83 kg/m .  Physical Exam  Constitutional:       General: He is not in acute distress.     Appearance: He is not ill-appearing.   Cardiovascular:      Rate and Rhythm: Normal rate and regular rhythm.      Pulses: Normal pulses.      Heart sounds: No murmur heard.  Pulmonary:      Effort: Pulmonary effort is normal. No respiratory distress.      Breath sounds: No wheezing or rales.   Neurological:      Mental Status: He is alert. "   Psychiatric:         Mood and Affect: Mood normal.          Results for orders placed or performed in visit on 04/04/24 (from the past 24 hour(s))   Urine Drug Screen Buprenorphine Urine Qualitative [id number], Ethanol Urine Qualitative QQR359, Methadone Urine Qualitative GWC2317, Oxycodone Urine Qualitative RFE3895, Creatinine Urine Random [id number]    Narrative    The following orders were created for panel order Urine Drug Screen Buprenorphine Urine Qualitative [id number], Ethanol Urine Qualitative JCV415, Methadone Urine Qualitative PAV4990, Oxycodone Urine Qualitative [id number], Creatinine Urine Random HKS184.  Procedure                               Abnormality         Status                     ---------                               -----------         ------                     Urine Drug Screen Panel[[id number]]      Abnormal            Final result               Buprenorphine Urine, Rogelio...[[id number]]  Abnormal            Final result               Ethanol urine[[id number]]                Normal              Final result               Methadone Qual Urine[[id number]]         Normal              Final result               Oxycodone Urine, Qualita...[[id number]]  Normal              Final result               Creatinine random urine[[id number]]                          Final result                 Please view results for these tests on the individual orders.   Urine Drug Screen Panel   Result Value Ref Range    Amphetamines Urine Screen Negative Screen Negative    Barbituates Urine Screen Negative Screen Negative    Benzodiazepine Urine Screen Negative Screen Negative    Cannabinoids Urine Screen Positive (A) Screen Negative    Cocaine Urine Screen Negative Screen Negative    Fentanyl Qual Urine Screen Negative Screen Negative    Opiates Urine Screen Negative Screen Negative    PCP Urine Screen Negative Screen Negative   Buprenorphine Urine, Qualitative   Result Value Ref Range    Buprenorphine Qual Urine Screen Positive  (A) Screen Negative   Ethanol urine   Result Value Ref Range    Ethanol Urine Screen Negative Screen Negative   Methadone Qual Urine   Result Value Ref Range    Methadone Urine Screen Negative Screen Negative   Oxycodone Urine, Qualitative   Result Value Ref Range    Oxycodone Urine Screen Negative Screen Negative   Creatinine random urine   Result Value Ref Range    Creatinine Urine mg/dL 119.2 mg/dL           Signed Electronically by: Althea Felix MD

## 2024-04-05 NOTE — PROGRESS NOTES
Clinic Care Coordination Contact  Care Team Conversations    CC sent Nnamdi information this date in regards to food resources - [employer] was sent to him.    Mental health - encouraged him to call [facility] and request an appointment with Arely Gannon CNP - as he was established with her for mental health med management.  She offers telemed visits - so Nnamdi would not have to leave work early - would schedule around his work schedule.    Also reminded him to call [facility] and cancel his ortho appointment that is scheduled for 4/8/24.    Next visit - will plan on him signing on SONG to obtain records from [facility] as well.    Jyothi Fowler, RN-BSN, [facility] Care Coordinator  [phone]

## 2024-05-09 ENCOUNTER — PATIENT OUTREACH (OUTPATIENT)
Dept: CARE COORDINATION | Facility: OTHER | Age: 24
End: 2024-05-09

## 2024-05-09 NOTE — PROGRESS NOTES
Clinic Care Coordination Contact  Care Team Conversations    CC sent Nnamdi a text message this date reminding him of his appointment tomorrow and to arrive at 3:45PM.    Jyothi Fowler RN-BSN, [facility] Care Coordinator  [phone]

## 2024-05-10 ENCOUNTER — PATIENT OUTREACH (OUTPATIENT)
Dept: CARE COORDINATION | Facility: OTHER | Age: 24
End: 2024-05-10

## 2024-05-10 NOTE — PROGRESS NOTES
Clinic Care Coordination Contact  Care Team Conversations    CC received message form Nnamdi this date stating he is unable to make it to his appointment today due to car issues.  CC did thank him for reaching out before his appointment and asked him to let CC know when he is able to come in for an appointment.    CC will wait for response.    Jyothi Fowler RN-BSN, [facility] Care Coordinator  [phone]

## 2024-05-21 DIAGNOSIS — F11.90 OPIOID USE DISORDER: ICD-10-CM

## 2024-05-21 RX ORDER — BUPRENORPHINE HYDROCHLORIDE, NALOXONE HYDROCHLORIDE 4; 1 MG/1; MG/1
1 FILM, SOLUBLE BUCCAL; SUBLINGUAL DAILY
Qty: 20 EACH | Refills: 0 | Status: SHIPPED | OUTPATIENT
Start: 2024-05-21 | End: 2024-06-06 | Stop reason: DRUGHIGH

## 2024-05-21 RX ORDER — BUPRENORPHINE HYDROCHLORIDE, NALOXONE HYDROCHLORIDE 8; 2 MG/1; MG/1
1 FILM, SOLUBLE BUCCAL; SUBLINGUAL 2 TIMES DAILY
Qty: 40 EACH | Refills: 0 | Status: SHIPPED | OUTPATIENT
Start: 2024-05-21 | End: 2024-06-06

## 2024-05-21 NOTE — TELEPHONE ENCOUNTER
Suboxone    - 20 day supply pended - get him to his next appointment.  June 6, 2024 arrive at 3:45PM.    Last Written Prescription Date:  4.18.24   Last Fill Quantity: #30, #60,   # refills: 0  Last Office Visit: 4.4.24  Future Office visit:       Routing refill request to provider for review/approval because:  Drug not on the [facility] or [facility] refill protocol or controlled substance

## 2024-06-05 ENCOUNTER — PATIENT OUTREACH (OUTPATIENT)
Dept: CARE COORDINATION | Facility: OTHER | Age: 24
End: 2024-06-05

## 2024-06-05 RX ORDER — MENTHOL/CETYLPYRD CL 3 MG
1 LOZENGE MUCOUS MEMBRANE
COMMUNITY
Start: 2024-05-22 | End: 2024-10-01

## 2024-06-05 NOTE — PROGRESS NOTES
Clinic Care Coordination Contact  Care Team Conversations    CC sent Nnamdi a text message this date reminding him of his appointment tomorrow and to arrive at 3:45PM.    Jyothi Fowler RN-BSN, [facility] Care Coordinator  [phone]

## 2024-06-05 NOTE — PROGRESS NOTES
Assessment & Plan     Opioid use disorder  Stable  [place] PDMP reviewed and [place]    - Urine Drug Screen Buprenorphine Urine Qualitative [id number], Ethanol Urine Qualitative SEL650, Methadone Urine Qualitative MZC7157, Oxycodone Urine Qualitative GUQ8296, Creatinine Urine Random LDY271  - SUBOXONE 8-2 MG per film; Place 1 Film under the tongue 3 times daily    Encounter for smoking cessation counseling  - [facility] (Tobacco Cessation) Referral      See Patient Instructions    Next visit 8/1/20234    Jae Coto is a 23 year old, presenting for the following health issues:  Recheck Medication        6/6/2024     3:44 PM   Additional Questions   Roomed by Jesse Navarro lpn   Accompanied by self         6/6/2024     3:44 PM   Patient Reported Additional Medications   Patient reports taking the following new medications none     HPI     He is currently taking 20/5 mg of buprenorphine daily.  - increased himself to 24mg daily - stress, anxiety - leading to cravings   Last fill 5.21.24 - 20 day supply    Status Since Last Visit:  Have you used any opioids since your last visit?: no use since last visit  Do you feel that your dose of suboxone is too high or too low? Too low -  Have there been cravings for opioids? Yes:      Any withdrawal symptoms?  None      Any side effects from the medication?  None  Any alcohol use? None  Any other recreational drug use? THC    Precipitating Factors:  Triggers have been: mild stress, anxiety   Other Supports:  Do you attend counseling or meet with a therapist? No  Do you attend NA or AA meetings? No  Do you have/meet with a sponsor? No  Family and support systems have been: Helpful  What other goals have you been working on (job, family, relationships, etc)? Working full time and got a raise   Weight is up 3# since last visit  Got his 's license back  Smoking cessation - interested   Has not started his Depakote - has it available - but things are going good per  his report  Cut down on [place], but drinking coffee (one 16oz)          4/7/2016    10:08 AM 10/16/2023    12:49 PM 4/4/2024     3:55 PM   PHQ-9 SCORE   PHQ-9 Total Score MyChart  6 (Mild depression)    PHQ-9 Total Score 10 6 0            No data to display              PDMP Review         Value Time User    State PDMP site checked  Yes 6/6/2024  4:07 PM Althea Felix MD            Wt Readings from Last 4 Encounters:   06/06/24 76.5 kg (168 lb 9.6 oz)   04/04/24 75.2 kg (165 lb 12.8 oz)   03/07/24 75.8 kg (167 lb 1.6 oz)   12/29/23 78.6 kg (173 lb 4.8 oz)         Review of Systems  Constitutional, HEENT, cardiovascular, pulmonary, gi and gu systems are negative, except as otherwise noted.      Objective    /70 (BP Location: Right arm, Patient Position: Sitting, Cuff Size: Adult Regular)   Pulse 75   Temp 97.4  F (36.3  C) (Tympanic)   Wt 76.5 kg (168 lb 9.6 oz)   SpO2 98%   BMI 21.18 kg/m    Body mass index is 21.18 kg/m .  Physical Exam  Constitutional:       General: He is not in acute distress.     Appearance: He is not ill-appearing.   Cardiovascular:      Rate and Rhythm: Normal rate and regular rhythm.      Pulses: Normal pulses.      Heart sounds: No murmur heard.  Pulmonary:      Effort: Pulmonary effort is normal. No respiratory distress.      Breath sounds: No wheezing or rales.   Neurological:      Mental Status: He is alert.   Psychiatric:         Mood and Affect: Mood normal.          Results for orders placed or performed in visit on 06/06/24 (from the past 24 hour(s))   Urine Drug Screen Buprenorphine Urine Qualitative GNC5111, Ethanol Urine Qualitative QAT222, Methadone Urine Qualitative VWY1106, Oxycodone Urine Qualitative ASJ9100, Creatinine Urine Random EXM447    Narrative    The following orders were created for panel order Urine Drug Screen Buprenorphine Urine Qualitative HBE6922, Ethanol Urine Qualitative IEP639, Methadone Urine Qualitative RBK1238, Oxycodone Urine  Qualitative AOE6989, Creatinine Urine Random FBM671.  Procedure                               Abnormality         Status                     ---------                               -----------         ------                     Urine Drug Screen Panel[[id number]]                          In process                 Buprenorphine Urine, Rogelio...[[id number]]                      In process                 Ethanol urine[[id number]]                                    In process                 Methadone Qual Urine[[id number]]                             In process                 Oxycodone Urine, Qualita...[[id number]]                      In process                 Creatinine random urine[[id number]]                          In process                   Please view results for these tests on the individual orders.           Signed Electronically by: Althea Felix MD

## 2024-06-06 ENCOUNTER — APPOINTMENT (OUTPATIENT)
Dept: LAB | Facility: OTHER | Age: 24
End: 2024-06-06
Payer: COMMERCIAL

## 2024-06-06 ENCOUNTER — PATIENT OUTREACH (OUTPATIENT)
Dept: CARE COORDINATION | Facility: OTHER | Age: 24
End: 2024-06-06

## 2024-06-06 ENCOUNTER — OFFICE VISIT (OUTPATIENT)
Dept: FAMILY MEDICINE | Facility: OTHER | Age: 24
End: 2024-06-06
Attending: FAMILY MEDICINE
Payer: COMMERCIAL

## 2024-06-06 VITALS
OXYGEN SATURATION: 98 % | WEIGHT: 168.6 LBS | TEMPERATURE: 97.4 F | SYSTOLIC BLOOD PRESSURE: 108 MMHG | HEART RATE: 75 BPM | DIASTOLIC BLOOD PRESSURE: 70 MMHG | BODY MASS INDEX: 21.18 KG/M2

## 2024-06-06 DIAGNOSIS — F11.90 OPIOID USE DISORDER: Primary | ICD-10-CM

## 2024-06-06 DIAGNOSIS — Z71.6 ENCOUNTER FOR SMOKING CESSATION COUNSELING: ICD-10-CM

## 2024-06-06 LAB
AMPHETAMINES UR QL SCN: ABNORMAL
BARBITURATES UR QL SCN: ABNORMAL
BENZODIAZ UR QL SCN: ABNORMAL
BUPRENORPHINE UR QL: ABNORMAL
BZE UR QL SCN: ABNORMAL
CANNABINOIDS UR QL SCN: ABNORMAL
CREAT UR-MCNC: 111 MG/DL
ETHANOL UR QL SCN: NORMAL
FENTANYL UR QL: ABNORMAL
METHADONE UR QL SCN: NORMAL
OPIATES UR QL SCN: ABNORMAL
OXYCODONE UR QL: NORMAL
PCP QUAL URINE (ROCHE): ABNORMAL

## 2024-06-06 PROCEDURE — 80307 DRUG TEST PRSMV CHEM ANLYZR: CPT | Mod: ZL | Performed by: FAMILY MEDICINE

## 2024-06-06 PROCEDURE — 82570 ASSAY OF URINE CREATININE: CPT | Mod: ZL | Performed by: FAMILY MEDICINE

## 2024-06-06 PROCEDURE — 99213 OFFICE O/P EST LOW 20 MIN: CPT | Performed by: FAMILY MEDICINE

## 2024-06-06 PROCEDURE — G0463 HOSPITAL OUTPT CLINIC VISIT: HCPCS

## 2024-06-06 RX ORDER — BUPRENORPHINE HYDROCHLORIDE, NALOXONE HYDROCHLORIDE 8; 2 MG/1; MG/1
1 FILM, SOLUBLE BUCCAL; SUBLINGUAL 3 TIMES DAILY
Qty: 84 EACH | Refills: 1 | Status: SHIPPED | OUTPATIENT
Start: 2024-06-06 | End: 2024-08-01

## 2024-06-06 RX ORDER — BUPRENORPHINE HYDROCHLORIDE, NALOXONE HYDROCHLORIDE 4; 1 MG/1; MG/1
1 FILM, SOLUBLE BUCCAL; SUBLINGUAL DAILY
Qty: 28 EACH | Refills: 1 | Status: CANCELLED | OUTPATIENT
Start: 2024-06-06

## 2024-06-06 ASSESSMENT — PAIN SCALES - GENERAL: PAINLEVEL: NO PAIN (0)

## 2024-06-06 NOTE — PROGRESS NOTES
Clinic Care Coordination Contact  Follow Up Progress Note      Assessment: RN CC attended office visit with Nnamdi and Dr. Felix this date.  Nnamdi is doing well in recovery.  Does admit to increasing his Suboxone dose on his own - now taking 24mg daily.  He is aware that this is the max dose and we cannot increase this any further.  When he gets stressed - his first instinct is to increase his dose.  We have discussed this on multiple occasions - developing healthy coping skills.  We have offered counseling/med management/etc referrals for him, but has difficulty committing - he prefers to work as much as he can.  Will continue to work on this with him.  He was able to get his 's license back - commend him on this!  Interested in smoking cessation - quit partner referral placed this date.      Care Gaps:    Health Maintenance Due   Topic Date Due    YEARLY PREVENTIVE VISIT  04/07/2017    HEPATITIS A IMMUNIZATION (1 of 2 - Risk 2-dose series) Never done    Pneumococcal Vaccine: Pediatrics (0 to 5 Years) and At-Risk Patients (6 to 64 Years) (2 of 2 - PCV) 11/09/2021    COVID-19 Vaccine (1 - 2023-24 season) Never done       Will continue to work on these    Care Plans  Care Plan: Medication Regimen       Problem: Medication Adherence       Goal: Consistently take Medications as Prescribed       This Visit's Progress: 60% Recent Progress: 70%    Note:     Barriers: increases meds on own, lack of coping  Strengths: honesty  Patient expressed understanding of goal: Yes  Action steps to achieve this goal:  1. I will take my medications as prescribed  2. I will not increase my Suboxone dose, as I am now on the max dose  3. I will reach out to care team if I am struggling                              Care Plan: Nutrition       Problem: Diet management       Goal: Demonstrate improved diet management                             Intervention/Education provided during outreach: Continue meds as prescribed.  Think about  re-establishing at [facility] with Arely Gannon, as they offered virtual visits that could possibly work around your work schedule.  Interested in smoking cessation.  Quit partner referral placed this date.  Work on healthy coping skills - what else can you do versus increasing meds?  Read, go for a walk, journal, etc.     Outreach Frequency: 6 weeks, more frequently as needed        Plan:   Dr. Felix did increase Suboxone to 8/2mg TID.  He is aware that this is the max dose and med cannot be increased.    Care Coordinator will follow up in 8 weeks, sooner if he has concerns.    Jyothi Fowler, RN-BSN, [facility] Care Coordinator  [phone]

## 2024-07-31 ENCOUNTER — PATIENT OUTREACH (OUTPATIENT)
Dept: CARE COORDINATION | Facility: OTHER | Age: 24
End: 2024-07-31

## 2024-07-31 NOTE — PROGRESS NOTES
Clinic Care Coordination Contact  Care Team Conversations    RN CC sent Nnamdi a text message this date reminding him of his appointment tomorrow and to arrive at 3:45PM.    Jyothi Fowler RN-BSN, [facility] Care Coordinator  [phone]

## 2024-07-31 NOTE — PROGRESS NOTES
"  Assessment & Plan     Opioid use disorder  Stable  [place] and [place]    - Urine Drug Screen Buprenorphine Urine Qualitative [id number], Ethanol Urine Qualitative VLG311, Methadone Urine Qualitative SIB2277, Oxycodone Urine Qualitative WDS7746, Creatinine Urine Random GGY922  - c/w  SUBOXONE 8-2 MG per film; Place 1 Film under the tongue 3 times daily      See Patient Instructions    Next visit 10/3/2024    Jae Coto is a 23 year old, presenting for the following health issues:  Recheck Medication    HPI       He is currently taking 8/2 mg of buprenorphine 3 times daily.   Last fill 7.10.24 #84.    Status Since Last Visit:  Have you used any opioids since your last visit?: no use since last visit  Do you feel that your dose of suboxone is too high or too low? Adequate  Have there been cravings for opioids? No   Any withdrawal symptoms?  No     Any side effects from the medication?  None  Any alcohol use? None   Any other recreational drug use? THC     Precipitating Factors:  Triggers have been: mild anxiety, stress   Other Supports:  Do you attend counseling or meet with a therapist? No  Do you attend NA or AA meetings? No  Do you have/meet with a sponsor? No  Family and support systems have been: Helpful   What other goals have you been working on (job, family, relationships, etc)? Working full time at [employer]  Thinking about applying to the [employer]  Taking med as prescribed - feels stable on dosing   Hasn't taken more than prescribed   Daughter Ehsan is 10 months old   Got a \"big truck\"   Financial situation getting better at home - working more hours   Still not taking Depakote   Wants to quit vaping - didn't answer phone call from [facility] - provided him with phone number today           4/7/2016    10:08 AM 10/16/2023    12:49 PM 4/4/2024     3:55 PM   PHQ-9 SCORE   PHQ-9 Total Score MyChart  6 (Mild depression)    PHQ-9 Total Score 10 6 0            No data to display " "             PDMP Review         Value Time User    State PDMP site checked  Yes 6/6/2024  4:07 PM Althea Felix MD              Review of Systems  Constitutional, HEENT, cardiovascular, pulmonary, gi and gu systems are negative, except as otherwise noted.      Objective    /50   Pulse 76   Temp 99  F (37.2  C) (Tympanic)   Resp 16   Ht 1.9 m (6' 2.8\")   Wt 76.9 kg (169 lb 9.6 oz)   SpO2 96%   BMI 21.31 kg/m    Body mass index is 21.31 kg/m .  Physical Exam  Constitutional:       General: He is not in acute distress.     Appearance: He is not ill-appearing.   Cardiovascular:      Rate and Rhythm: Normal rate and regular rhythm.      Heart sounds: No murmur heard.  Pulmonary:      Effort: Pulmonary effort is normal. No respiratory distress.      Breath sounds: No wheezing or rales.   Neurological:      Mental Status: He is alert.   Psychiatric:         Mood and Affect: Mood normal.            Results for orders placed or performed in visit on 08/01/24 (from the past 24 hour(s))   Urine Drug Screen Buprenorphine Urine Qualitative [id number], Ethanol Urine Qualitative ERO439, Methadone Urine Qualitative MVR1146, Oxycodone Urine Qualitative CMF5350, Creatinine Urine Random PUY487    Narrative    The following orders were created for panel order Urine Drug Screen Buprenorphine Urine Qualitative WSQ3730, Ethanol Urine Qualitative HCB941, Methadone Urine Qualitative BWS7585, Oxycodone Urine Qualitative KTK3412, Creatinine Urine Random DUC368.  Procedure                               Abnormality         Status                     ---------                               -----------         ------                     Urine Drug Screen Panel[[id number]]      Abnormal            Final result               Buprenorphine Urine, Rogelio...[[id number]]  Abnormal            Final result               Ethanol urine[[id number]]                Normal              Final result               Methadone Qual Urine[[id number]]    "      Normal              Final result               Oxycodone Urine, Qualita...[[id number]]  Normal              Final result               Creatinine random urine[[id number]]                          Final result                 Please view results for these tests on the individual orders.   Urine Drug Screen Panel   Result Value Ref Range    Amphetamines Urine Screen Negative Screen Negative    Barbituates Urine Screen Negative Screen Negative    Benzodiazepine Urine Screen Negative Screen Negative    Cannabinoids Urine Screen Positive (A) Screen Negative    Cocaine Urine Screen Negative Screen Negative    Fentanyl Qual Urine Screen Negative Screen Negative    Opiates Urine Screen Negative Screen Negative    PCP Urine Screen Negative Screen Negative   Buprenorphine Urine, Qualitative   Result Value Ref Range    Buprenorphine Qual Urine Screen Positive (A) Screen Negative   Ethanol urine   Result Value Ref Range    Ethanol Urine Screen Negative Screen Negative   Methadone Qual Urine   Result Value Ref Range    Methadone Urine Screen Negative Screen Negative   Oxycodone Urine, Qualitative   Result Value Ref Range    Oxycodone Urine Screen Negative Screen Negative   Creatinine random urine   Result Value Ref Range    Creatinine Urine mg/dL 81.8 mg/dL           Signed Electronically by: Althea Felix MD

## 2024-08-01 ENCOUNTER — OFFICE VISIT (OUTPATIENT)
Dept: FAMILY MEDICINE | Facility: OTHER | Age: 24
End: 2024-08-01
Attending: FAMILY MEDICINE
Payer: COMMERCIAL

## 2024-08-01 ENCOUNTER — APPOINTMENT (OUTPATIENT)
Dept: LAB | Facility: OTHER | Age: 24
End: 2024-08-01
Attending: FAMILY MEDICINE
Payer: COMMERCIAL

## 2024-08-01 ENCOUNTER — PATIENT OUTREACH (OUTPATIENT)
Dept: CARE COORDINATION | Facility: OTHER | Age: 24
End: 2024-08-01

## 2024-08-01 VITALS
OXYGEN SATURATION: 96 % | RESPIRATION RATE: 16 BRPM | BODY MASS INDEX: 21.09 KG/M2 | DIASTOLIC BLOOD PRESSURE: 50 MMHG | HEART RATE: 76 BPM | TEMPERATURE: 99 F | WEIGHT: 169.6 LBS | SYSTOLIC BLOOD PRESSURE: 100 MMHG | HEIGHT: 75 IN

## 2024-08-01 DIAGNOSIS — F11.90 OPIOID USE DISORDER: Primary | ICD-10-CM

## 2024-08-01 LAB
AMPHETAMINES UR QL SCN: ABNORMAL
BARBITURATES UR QL SCN: ABNORMAL
BENZODIAZ UR QL SCN: ABNORMAL
BUPRENORPHINE UR QL: ABNORMAL
BZE UR QL SCN: ABNORMAL
CANNABINOIDS UR QL SCN: ABNORMAL
CREAT UR-MCNC: 81.8 MG/DL
ETHANOL UR QL SCN: NORMAL
FENTANYL UR QL: ABNORMAL
METHADONE UR QL SCN: NORMAL
OPIATES UR QL SCN: ABNORMAL
OXYCODONE UR QL: NORMAL
PCP QUAL URINE (ROCHE): ABNORMAL

## 2024-08-01 PROCEDURE — 80307 DRUG TEST PRSMV CHEM ANLYZR: CPT | Mod: ZL,91 | Performed by: FAMILY MEDICINE

## 2024-08-01 PROCEDURE — 80307 DRUG TEST PRSMV CHEM ANLYZR: CPT | Mod: ZL | Performed by: FAMILY MEDICINE

## 2024-08-01 PROCEDURE — 82570 ASSAY OF URINE CREATININE: CPT | Mod: ZL,XU | Performed by: FAMILY MEDICINE

## 2024-08-01 PROCEDURE — 99213 OFFICE O/P EST LOW 20 MIN: CPT | Performed by: FAMILY MEDICINE

## 2024-08-01 PROCEDURE — G0463 HOSPITAL OUTPT CLINIC VISIT: HCPCS | Mod: 25

## 2024-08-01 PROCEDURE — G0463 HOSPITAL OUTPT CLINIC VISIT: HCPCS

## 2024-08-01 RX ORDER — BUPRENORPHINE HYDROCHLORIDE, NALOXONE HYDROCHLORIDE 8; 2 MG/1; MG/1
1 FILM, SOLUBLE BUCCAL; SUBLINGUAL 3 TIMES DAILY
Qty: 84 EACH | Refills: 1 | Status: SHIPPED | OUTPATIENT
Start: 2024-08-01 | End: 2024-10-03

## 2024-08-01 ASSESSMENT — PAIN SCALES - GENERAL: PAINLEVEL: NO PAIN (0)

## 2024-08-01 NOTE — PROGRESS NOTES
Clinic Care Coordination Contact  Follow Up Progress Note      Assessment: RN CC attended office visit with Nnamdi and Dr. Felix this date.  We last saw Nnamdi 8 weeks ago, where he ended up increasing his Suboxone on his own to the max dose of 24/6mg daily due to stressors/anxiety that were leading to cravings per his report.  We have discussed triggers and healthy coping in the past - offered therapy/counseling, which he declined.  He prefers to work all that he can to provide for his family.   He continues to work full time and picks up extra shifts as able.  He has been taking his Suboxone as prescribed - has not taken more than 24/6mg daily.  Commended on this!  He is aware that he is on the max dose - and there will be no further dose increases.  Reports no cravings or thoughts of using - feels very stable on dose.  Interested in quitting vaping - he was referred to the [place] Quit Program - but never answered the phone calls.  RN CC provided him with the phone number to call the [facility]uit Program and encouraged him to call.      Care Gaps:    Health Maintenance Due   Topic Date Due    YEARLY PREVENTIVE VISIT  04/07/2017    HEPATITIS A IMMUNIZATION (1 of 2 - Risk 2-dose series) Never done    Pneumococcal Vaccine: Pediatrics (0 to 5 Years) and At-Risk Patients (6 to 64 Years) (2 of 2 - PCV) 11/09/2021    COVID-19 Vaccine (1 - 2023-24 season) Never done       Will continue to work on these    Care Plans  Care Plan: Medication Regimen       Problem: Medication Adherence       Goal: Consistently take Medications as Prescribed       This Visit's Progress: 90% Recent Progress: 60%    Note:     Barriers: increases meds on own, lack of coping  Strengths: honesty  Patient expressed understanding of goal: Yes  Action steps to achieve this goal:  1. I will take my medications as prescribed  2. I will not increase my Suboxone dose, as I am now on the max dose  3. I will reach out to care team if I am struggling                               Care Plan: Nutrition       Problem: Diet management       Goal: Demonstrate improved diet management                             Intervention/Education provided during outreach: Continue current meds as prescribed.  Call the [place] Quit Program to help you work on smoking cessation.  Continue to look for new job opportunities.      Outreach Frequency: 2 months, more frequently as needed        Plan:   No change in treatment plan at this time.     Care Coordinator will follow up in 8 weeks, sooner if he has concerns.    Jyothi Fowler RN-BSN, [facility] Care Coordinator  [phone]

## 2024-08-26 ENCOUNTER — PATIENT OUTREACH (OUTPATIENT)
Dept: CARE COORDINATION | Facility: OTHER | Age: 24
End: 2024-08-26

## 2024-08-26 NOTE — PROGRESS NOTES
Clinic Care Coordination Contact  Care Team Conversations    RN CC sent Nnamdi a text message this date reminding him of his appointment tomorrow and to arrive at 12:45PM.  Informed him that this is a physical.      Jyothi Fowler RN-BSN, [facility] Care Coordinator  [phone]

## 2024-10-02 ENCOUNTER — PATIENT OUTREACH (OUTPATIENT)
Dept: CARE COORDINATION | Facility: OTHER | Age: 24
End: 2024-10-02

## 2024-10-02 NOTE — PROGRESS NOTES
Clinic Care Coordination Contact  Care Team Conversations    RN CC sent Nnamdi a text message this date reminding him of his appointment tomorrow and to arrive at 3:45PM.    Jyothi Fowler RN-BSN, [facility] Care Coordinator  [phone]

## 2024-10-02 NOTE — PROGRESS NOTES
Assessment & Plan     Opioid use disorder  Stable. Weight slightly down, but very active   - Urine Drug Screen Buprenorphine Urine Qualitative BYC6304, Ethanol Urine Qualitative MTZ487, Methadone Urine Qualitative RFL0820, Oxycodone Urine Qualitative JRK4999, Creatinine Urine Random PYS408  - SUBOXONE 8-2 MG per film; Place 1 Film under the tongue 3 times daily.      The longitudinal plan of care for the diagnosis(es)/condition(s) as documented were addressed during this visit. Due to the added complexity in care, I will continue to support Nnamdi in the subsequent management and with ongoing continuity of care.      See Patient Instructions    No follow-ups on file.    Subjective   Nnamdi is a 23 year old, presenting for the following health issues:  Recheck Medication        10/3/2024     4:00 PM   Additional Questions   Roomed by Daniela Orozco   Accompanied by self     HPI         He is currently taking 8/2 mg of buprenorphine 3 times daily.   Last fill 9.12.24 #84.    Status Since Last Visit:  Have you used any opioids since your last visit?: no use since last visit  Do you feel that your dose of suboxone is too high or too low? Adequate  Have there been cravings for opioids? No   Any withdrawal symptoms?  None     Any side effects from the medication?  None  Any alcohol use? None  Any other recreational drug use? THC    Precipitating Factors:  Triggers have been: non-existent   Other Supports:  Do you attend counseling or meet with a therapist? No  Do you attend NA or AA meetings? No  Do you have/meet with a sponsor? No  Family and support systems have been: Helpful  What other goals have you been working on (job, family, relationships, etc)? Working full time  Got a $4/hr raise   Still vaping - interested in quitting - has # for [place] Quit Line   September 26 - 1 year anniversary off opioids   Home life is good   No desire to use Dilaudid - 1 year in our program on 10/6/2023   Has Narcan at home            "4/7/2016    10:08 AM 10/16/2023    12:49 PM 4/4/2024     3:55 PM   PHQ-9 SCORE   PHQ-9 Total Score MyChart  6 (Mild depression)    PHQ-9 Total Score 10 6 0            No data to display              PDMP Review         Value Time User    State PDMP site checked  Yes 8/1/2024  4:14 PM Althea Felix MD            Wt Readings from Last 4 Encounters:   10/03/24 73.6 kg (162 lb 4.8 oz)   08/01/24 76.9 kg (169 lb 9.6 oz)   06/06/24 76.5 kg (168 lb 9.6 oz)   04/04/24 75.2 kg (165 lb 12.8 oz)         Review of Systems  Constitutional, HEENT, cardiovascular, pulmonary, gi and gu systems are negative, except as otherwise noted.      Objective    /70   Pulse 86   Temp 98.9  F (37.2  C) (Tympanic)   Resp 16   Ht 1.88 m (6' 2\")   Wt 73.6 kg (162 lb 4.8 oz)   SpO2 94%   BMI 20.84 kg/m    Body mass index is 20.84 kg/m .  Physical Exam  Constitutional:       General: He is not in acute distress.     Appearance: He is not ill-appearing.   Cardiovascular:      Rate and Rhythm: Normal rate and regular rhythm.      Heart sounds: No murmur heard.  Pulmonary:      Effort: Pulmonary effort is normal. No respiratory distress.      Breath sounds: No wheezing or rales.   Neurological:      Mental Status: He is alert.   Psychiatric:         Mood and Affect: Mood normal.          Results for orders placed or performed in visit on 10/03/24 (from the past 24 hour(s))   Urine Drug Screen Buprenorphine Urine Qualitative RXA4934, Ethanol Urine Qualitative FSK962, Methadone Urine Qualitative DTJ9271, Oxycodone Urine Qualitative AUC5332, Creatinine Urine Random DJM510    Narrative    The following orders were created for panel order Urine Drug Screen Buprenorphine Urine Qualitative FJM3798, Ethanol Urine Qualitative SOM646, Methadone Urine Qualitative NFD0332, Oxycodone Urine Qualitative NJK6242, Creatinine Urine Random CFY343.  Procedure                               Abnormality         Status                     ---------      "                          -----------         ------                     Urine Drug Screen Panel[[id number]]                          In process                 Buprenorphine Urine, Rogelio...[[id number]]                      In process                 Ethanol urine[[id number]]                                    In process                 Methadone Qual Urine[[id number]]                             In process                 Oxycodone Urine, Qualita...[[id number]]                      In process                 Creatinine random urine[[id number]]                          In process                   Please view results for these tests on the individual orders.           Signed Electronically by: Althea Felix MD

## 2024-10-03 ENCOUNTER — APPOINTMENT (OUTPATIENT)
Dept: LAB | Facility: OTHER | Age: 24
End: 2024-10-03
Attending: FAMILY MEDICINE
Payer: COMMERCIAL

## 2024-10-03 ENCOUNTER — PATIENT OUTREACH (OUTPATIENT)
Dept: CARE COORDINATION | Facility: OTHER | Age: 24
End: 2024-10-03

## 2024-10-03 ENCOUNTER — OFFICE VISIT (OUTPATIENT)
Dept: FAMILY MEDICINE | Facility: OTHER | Age: 24
End: 2024-10-03
Attending: FAMILY MEDICINE
Payer: COMMERCIAL

## 2024-10-03 VITALS
WEIGHT: 162.3 LBS | RESPIRATION RATE: 16 BRPM | DIASTOLIC BLOOD PRESSURE: 70 MMHG | HEIGHT: 74 IN | HEART RATE: 86 BPM | TEMPERATURE: 98.9 F | OXYGEN SATURATION: 94 % | SYSTOLIC BLOOD PRESSURE: 110 MMHG | BODY MASS INDEX: 20.83 KG/M2

## 2024-10-03 DIAGNOSIS — F11.90 OPIOID USE DISORDER: Primary | ICD-10-CM

## 2024-10-03 LAB
AMPHETAMINES UR QL SCN: ABNORMAL
BARBITURATES UR QL SCN: ABNORMAL
BENZODIAZ UR QL SCN: ABNORMAL
BUPRENORPHINE UR QL: ABNORMAL
BZE UR QL SCN: ABNORMAL
CANNABINOIDS UR QL SCN: ABNORMAL
CREAT UR-MCNC: 158.6 MG/DL
ETHANOL UR QL SCN: NORMAL
FENTANYL UR QL: ABNORMAL
METHADONE UR QL SCN: NORMAL
OPIATES UR QL SCN: ABNORMAL
OXYCODONE UR QL: NORMAL
PCP QUAL URINE (ROCHE): ABNORMAL

## 2024-10-03 PROCEDURE — 80307 DRUG TEST PRSMV CHEM ANLYZR: CPT | Mod: ZL | Performed by: FAMILY MEDICINE

## 2024-10-03 PROCEDURE — 82570 ASSAY OF URINE CREATININE: CPT | Mod: ZL | Performed by: FAMILY MEDICINE

## 2024-10-03 PROCEDURE — G0463 HOSPITAL OUTPT CLINIC VISIT: HCPCS | Mod: 25

## 2024-10-03 PROCEDURE — G2211 COMPLEX E/M VISIT ADD ON: HCPCS | Performed by: FAMILY MEDICINE

## 2024-10-03 PROCEDURE — G0463 HOSPITAL OUTPT CLINIC VISIT: HCPCS

## 2024-10-03 PROCEDURE — 99213 OFFICE O/P EST LOW 20 MIN: CPT | Performed by: FAMILY MEDICINE

## 2024-10-03 RX ORDER — BUPRENORPHINE HYDROCHLORIDE, NALOXONE HYDROCHLORIDE 8; 2 MG/1; MG/1
1 FILM, SOLUBLE BUCCAL; SUBLINGUAL 3 TIMES DAILY
Qty: 84 EACH | Refills: 1 | Status: SHIPPED | OUTPATIENT
Start: 2024-10-03

## 2024-10-03 ASSESSMENT — ACTIVITIES OF DAILY LIVING (ADL): DEPENDENT_IADLS:: INDEPENDENT

## 2024-10-03 ASSESSMENT — PAIN SCALES - GENERAL: PAINLEVEL: NO PAIN (0)

## 2024-10-03 NOTE — PROGRESS NOTES
Clinic Care Coordination Contact  Follow Up Progress Note      Assessment: RN CC attended office visit with Nnamdi and Dr. Felix this date.  Nnamdi is doing great in recovery.  Had his 1 year anniversary off of opioids on 9/26/2024.  His 1 year anniversary in our program will be 10/6/2024.  Commended him on this!   He has been very solid since starting Suboxone.  Suboxone dose is adequate and would like to continue on current dose.  He is working full time.  Recently got a $4/hr raise!  His home life is good.  No complaints today.      Care Gaps:    Health Maintenance Due   Topic Date Due    YEARLY PREVENTIVE VISIT  04/07/2017    HEPATITIS A IMMUNIZATION (1 of 2 - Risk 2-dose series) Never done    Pneumococcal Vaccine: Pediatrics (0 to 5 Years) and At-Risk Patients (6 to 64 Years) (2 of 2 - PCV) 11/09/2021    INFLUENZA VACCINE (1) 09/01/2024    COVID-19 Vaccine (1 - 2024-25 season) Never done    NICOTINE/TOBACCO CESSATION COUNSELING Q 1 YR  10/06/2024       Will continue to work on these    Care Plans  Care Plan: Medication Regimen       Problem: Medication Adherence       Goal: Consistently take Medications as Prescribed       This Visit's Progress: 90% Recent Progress: 90%    Note:     Barriers: increases meds on own, lack of coping  Strengths: honesty  Patient expressed understanding of goal: Yes  Action steps to achieve this goal:  1. I will take my medications as prescribed  2. I will not increase my Suboxone dose, as I am now on the max dose  3. I will reach out to care team if I am struggling                              Care Plan: Nutrition       Problem: Diet management       Goal: Demonstrate improved diet management                             Intervention/Education provided during outreach: Continue current meds as prescribed.  Suboxone treatment agreement renewed and signed by Nnamdi and Dr. Felix this date.  Signed agreement sent down to scanning.       Outreach Frequency: 2 months, more frequently  as needed        Plan:   No change in treatment plan at this time.    Care Coordinator will follow up in 10 weeks, sooner if he has concerns.    Jyothi Fowler RN-BSN, [facility] Care Coordinator  [phone]

## 2024-10-03 NOTE — LETTER
Nnamdi Weeks  [id number]         October 3, 2024    Suboxone (Buprenorphine/Naloxone) Treatment Agreement    This is an agreement between you and your provider about the safe and appropriate use of Suboxone (Buprenorphine/Naloxone) prescribed by your care team.    We are committed to collaborating with you in your efforts to get better. To support you in this work, we will help you schedule regular office appointments for medicine refills. If we must cancel or change your appointment for any reason, we will make sure you have enough medicine to last until your next appointment.     As a Provider, I will:  Listen carefully to your concerns and treat you with respect.   Recommend a treatment plan that I believe is in your best interest. This plan may involve therapies other than Suboxone (Buprenorphine/Naloxone).   Talk with you often about the possible benefits, and the risk of harm of any medicine that we prescribe for you.   Provide a plan on how to taper (discontinue or go off) using this medicine if the decision is made to stop its use.    As a Patient, I understand that Suboxone (Buprenorphine/Naloxone):   Is a controlled substance prescribed by my care team to help me function or work and manage my condition(s).   Needs to be taken exactly as prescribed. Combining Suboxone (Buprenorphine/Naloxone) with certain medicines or chemicals (such as illegal drugs, sedatives, sleeping pills, and benzodiazepines) can be dangerous or even fatal. If I stop Suboxone (Buprenorphine/Naloxone) suddenly, I may have severe withdrawal symptoms.    The risks, benefits and side effects of these medicine(s) were explained to me. I agree that:  I will take part in other treatments as advised by my care team. This may be psychiatry or counseling, physical therapy, behavioral therapy, group treatment or a referral to a specialist.     I will keep all my appointments. I understand that this is part of the monitoring of Suboxone  (Buprenorphine/Naloxone). My care team may require an office visit for EVERY refill. If I miss appointments or do not follow instructions, my care team may stop my medicine.    I will be respectful and considerate to all staff and providers at the clinic. Rude or aggressive behavior to staff including providers, nursing staff, , and any others will not be tolerated.    I will be honest with my care team.     I will take my medicines as prescribed. I will not change the dose or schedule unless my care team tells me to. There will be no refills if I run out early.     I may be asked to come to the clinic and complete a urine drug test or complete a film/pill count at any time. If I do not give a urine sample or participate in a film/pill count, my care team may stop my medicine.    It is up to me to make sure that I do not run out of my medicines on weekends or holidays. If my care team is willing to refill my Suboxone (Buprenorphine/Naloxone) prescription without a visit, I must request refills only during office hours. Refills may take up to three business days to process. I will use one pharmacy to fill all my Suboxone (Buprenorphine/Naloxone) and other controlled substance prescriptions. I will notify the clinic about any changes to my insurance or medication availability.    I am responsible for my prescriptions. If the medicine/prescription is lost, stolen, or destroyed, it will not be replaced. I will store my medication in a secure location away from children. I also agree not to share controlled substance medicines with anyone.    I am aware I should not use any illegal or recreational drugs. I agree not to drink alcohol unless my care team says I can.     I will tell my care team right away if I become pregnant, have a new medical problem treated outside of my regular clinic, or have a change in my medications.    I understand that this medicine can affect my thinking, judgment, and reaction time.  Alcohol and drugs affect the brain and body, which can affect the safety of my driving. Being under the influence of alcohol or drugs can affect my decision-making, behaviors, personal safety, and the safety of others. Driving while impaired (DWI) can occur if a person is driving, operating, or in physical control of a car, motorcycle, boat, snowmobile, ATV, motorbike, off-road vehicle, or any other motor vehicle ([place] Statute 169A.20). I understand the risk if I choose to drive or operate any vehicle or machinery.    I understand that if I do not follow any of the conditions above, my prescriptions or treatment may be stopped or changed.    I agree that my provider, clinic care team, and pharmacy may work with any city, state or federal law enforcement agency that investigates the misuse, sale, or other diversion of my controlled medicine. I will allow my provider to discuss my care with, or share a copy of, this agreement with any other treating provider, pharmacy, or emergency room where I receive care.    This agreement will remain in effect for duration of therapy and updated as appropriate, and/or  annually.    I have read this agreement and have asked questions about anything I did not understand.    __________________________________            ____________________________________  Patient     Date          Althea Felix MD                     Date

## 2024-11-08 DIAGNOSIS — F11.90 OPIOID USE DISORDER: Primary | ICD-10-CM

## 2024-11-08 RX ORDER — BUPRENORPHINE HYDROCHLORIDE AND NALOXONE HYDROCHLORIDE DIHYDRATE 8; 2 MG/1; MG/1
1 TABLET SUBLINGUAL 3 TIMES DAILY
Qty: 20 TABLET | Refills: 0 | Status: SHIPPED | OUTPATIENT
Start: 2024-11-08 | End: 2024-11-14

## 2024-11-08 NOTE — TELEPHONE ENCOUNTER
Suboxone -    unable to get refill on [facility] in [place] - as he just found out he has not active insurance  He has paperwork for this and plans on going to the [facility] today.  Provided him with [facility] medical phone # to call as well -   Needs to be switched to tabs due to cost.  1 week supply pended of 8/2mg 1 TID #20.    Sent Nnamdi a goodrx coupon this date    Last Written Prescription Date:  10.11.24  Last Fill Quantity: #84,   # refills: 0  Last Office Visit: 10.3.24  Future Office visit:       Routing refill request to provider for review/approval because:  Drug not on the [facility], [facility] or [facility] refill protocol or controlled substance

## 2024-11-14 DIAGNOSIS — F11.90 OPIOID USE DISORDER: ICD-10-CM

## 2024-11-14 RX ORDER — BUPRENORPHINE HYDROCHLORIDE AND NALOXONE HYDROCHLORIDE DIHYDRATE 8; 2 MG/1; MG/1
1 TABLET SUBLINGUAL 3 TIMES DAILY
Qty: 20 TABLET | Refills: 0 | Status: SHIPPED | OUTPATIENT
Start: 2024-11-14

## 2024-11-14 NOTE — TELEPHONE ENCOUNTER
Suboxone - 1 week supply pended - still waiting on his insurance to be active      Last Written Prescription Date:  11.8.24  Last Fill Quantity: #20,   # refills: 0  Last Office Visit: 10.3.24  Future Office visit:       Routing refill request to provider for review/approval because:  Drug not on the [facility], [facility] or [facility] refill protocol or controlled substance

## 2024-11-19 ENCOUNTER — PATIENT OUTREACH (OUTPATIENT)
Dept: CARE COORDINATION | Facility: OTHER | Age: 24
End: 2024-11-19

## 2024-11-19 NOTE — PROGRESS NOTES
Clinic Care Coordination Contact  Care Team Conversations    Nnamdi is in the process of applying for [place], but is currently without active insurance.  RN CC provided him with [facility] application and instructions.  Nnamdi sent application and proofs back to RN CC.  RN CC faxed documents to financial advisors.  Nnamdi notified - and informed him that if additional information is needed, someone will reach out to him.    Would individual benefit from a referral for additional services/resources?    Yes - Health-related social needs - [facility]      Jyothi Fowler RN-BSN, [facility] Care Coordinator  [phone]

## 2024-11-26 DIAGNOSIS — F11.90 OPIOID USE DISORDER: ICD-10-CM

## 2024-11-26 RX ORDER — BUPRENORPHINE HYDROCHLORIDE AND NALOXONE HYDROCHLORIDE DIHYDRATE 8; 2 MG/1; MG/1
1 TABLET SUBLINGUAL 3 TIMES DAILY
Qty: 20 TABLET | Refills: 1 | Status: SHIPPED | OUTPATIENT
Start: 2024-11-26

## 2024-11-26 NOTE — TELEPHONE ENCOUNTER
Suboxone - getting 1 week at a time right now due to lapse in insurance and waiting on ambar care - sending now so he will be able to  after Ana - RN CC will be out of office after today until Tuesday December 3.      Last Written Prescription Date:  11.22.24  Last Fill Quantity: #20,   # refills: 0  Last Office Visit: 10.3.24  Future Office visit:       Routing refill request to provider for review/approval because:  Drug not on the [facility], [facility] or [facility] refill protocol or controlled substance

## 2024-12-17 DIAGNOSIS — F11.90 OPIOID USE DISORDER: ICD-10-CM

## 2024-12-17 RX ORDER — BUPRENORPHINE HYDROCHLORIDE AND NALOXONE HYDROCHLORIDE DIHYDRATE 8; 2 MG/1; MG/1
1 TABLET SUBLINGUAL 3 TIMES DAILY
Qty: 90 TABLET | Refills: 0 | Status: SHIPPED | OUTPATIENT
Start: 2024-12-17

## 2024-12-17 NOTE — TELEPHONE ENCOUNTER
Suboxone      Last Written Prescription Date:  12.11.24  Last Fill Quantity: #20,   # refills: 0  Last Office Visit: 10.3.24  Future Office visit:   12.19.24    Routing refill request to provider for review/approval because:  Drug not on the [facility], [facility] or [facility] refill protocol or controlled substance

## 2024-12-18 ENCOUNTER — PATIENT OUTREACH (OUTPATIENT)
Dept: CARE COORDINATION | Facility: OTHER | Age: 24
End: 2024-12-18

## 2024-12-18 NOTE — PROGRESS NOTES
Clinic Care Coordination Contact  Care Team Conversations    RN CC sent Nnamdi a text message this date reminding him of his appointment tomorrow and to arrive at 3:45PM.    Jyothi Fowler RN-BSN, [facility] Care Coordinator  [phone]

## 2024-12-19 ENCOUNTER — PATIENT OUTREACH (OUTPATIENT)
Dept: CARE COORDINATION | Facility: OTHER | Age: 24
End: 2024-12-19

## 2024-12-19 NOTE — PROGRESS NOTES
Clinic Care Coordination Contact  Care Team Conversations    RN CC received a text from Nnamdi this date stating that he has to work late today and tomorrow.  He is asking if his appointment can be rescheduled.  RN CC did reschedule him an appointment with Dr. Felix - Tuesday, January 28, 2025, arrive at 3:45PM.  Encouraged him to reach out to RN CC when he is needing a refill of his Suboxone, as his current RX will not last him until that appointment.  He stated understanding.    Jyothi Fowler RN-BSN, [facility] Care Coordinator  [phone]

## 2025-01-14 DIAGNOSIS — F11.90 OPIOID USE DISORDER: ICD-10-CM

## 2025-01-14 RX ORDER — BUPRENORPHINE HYDROCHLORIDE AND NALOXONE HYDROCHLORIDE DIHYDRATE 8; 2 MG/1; MG/1
1 TABLET SUBLINGUAL 3 TIMES DAILY
Qty: 45 TABLET | Refills: 0 | Status: SHIPPED | OUTPATIENT
Start: 2025-01-14 | End: 2025-01-16

## 2025-01-14 NOTE — TELEPHONE ENCOUNTER
Suboxone    - 2 week supply to get him to his next scheduled appointment - 1/28/25  Last Written Prescription Date:  12.17.24  Last Fill Quantity: #90,   # refills: 0  Last Office Visit: 10.3.24  Future Office visit:       Routing refill request to provider for review/approval because:  Drug not on the [facility], [facility] or [facility] refill protocol or controlled substance

## 2025-01-16 RX ORDER — BUPRENORPHINE HYDROCHLORIDE AND NALOXONE HYDROCHLORIDE DIHYDRATE 8; 2 MG/1; MG/1
1 TABLET SUBLINGUAL 3 TIMES DAILY
Qty: 45 TABLET | Refills: 0 | Status: SHIPPED | OUTPATIENT
Start: 2025-01-16

## 2025-01-16 NOTE — TELEPHONE ENCOUNTER
Nnamdi is having issues with his insurance again.  [facility]  Needs this RX sent to [facility].

## 2025-01-27 ENCOUNTER — PATIENT OUTREACH (OUTPATIENT)
Dept: CARE COORDINATION | Facility: OTHER | Age: 25
End: 2025-01-27

## 2025-01-27 NOTE — PROGRESS NOTES
Assessment & Plan     Opioid use disorder  Doing well  [place] PDMP reviewed and appropriate    - Urine Drug Screen Buprenorphine Urine Qualitative [id number], Ethanol Urine Qualitative [id number], Methadone Urine Qualitative [id number], Oxycodone Urine Qualitative [id number], Creatinine Urine Random [id number]  - buprenorphine-naloxone (SUBOXONE) 8-2 MG SUBL sublingual tablet; Place 1 tablet under the tongue 3 times daily.      The longitudinal plan of care for the diagnosis(es)/condition(s) as documented were addressed during this visit. Due to the added complexity in care, I will continue to support Nnamdi in the subsequent management and with ongoing continuity of care.      See Patient Instructions    Next visit 3/25/2025    Subjective   Nnamdi is a 24 year old, presenting for the following health issues:  Recheck Medication        1/28/2025     3:49 PM   Additional Questions   Roomed by andria bain         1/28/2025     3:49 PM   Patient Reported Additional Medications   Patient reports taking the following new medications none     HPI       He is currently taking 8/2 mg of buprenorphine 3 times daily.   Last fill 1.16.25 #45.    Status Since Last Visit:  Have you used any opioids since your last visit?: no use since last visit  Do you feel that your dose of suboxone is too high or too low? Adequate  Have there been cravings for opioids? No   Any withdrawal symptoms?  None     Any side effects from the medication?  None  Any alcohol use? None  Any other recreational drug use? THC    Precipitating Factors:  Triggers have been: non-existent   Other Supports:  Do you attend counseling or meet with a therapist? No  Do you attend NA or AA meetings? No  Do you have/meet with a sponsor? No  Family and support systems have been: Helpful  What other goals have you been working on (job, family, relationships, etc)? Attending scheduled appointments  Communicating effectively with health care team  Continues to work full time - picking up  "extra hours as able   Taking meds as prescribed   Having issues with insurance       Wt Readings from Last 4 Encounters:   01/28/25 72.8 kg (160 lb 6.4 oz)   10/03/24 73.6 kg (162 lb 4.8 oz)   08/01/24 76.9 kg (169 lb 9.6 oz)   06/06/24 76.5 kg (168 lb 9.6 oz)               4/7/2016    10:08 AM 10/16/2023    12:49 PM 4/4/2024     3:55 PM   PHQ-9 SCORE   PHQ-9 Total Score MyChart  6 (Mild depression)    PHQ-9 Total Score 10 6 0            No data to display              PDMP Review         Value Time User    State PDMP site checked  Yes 1/28/2025  3:57 PM Althea Felix MD                  Review of Systems  Constitutional, HEENT, cardiovascular, pulmonary, gi and gu systems are negative, except as otherwise noted.      Objective    /76 (BP Location: Left arm, Patient Position: Sitting, Cuff Size: Adult Regular)   Pulse 73   Temp 98.7  F (37.1  C) (Tympanic)   Resp 16   Ht 1.88 m (6' 2\")   Wt 72.8 kg (160 lb 6.4 oz)   SpO2 95%   BMI 20.59 kg/m    Body mass index is 20.59 kg/m .  Physical Exam  Constitutional:       General: He is not in acute distress.     Appearance: He is not ill-appearing.   Cardiovascular:      Rate and Rhythm: Normal rate and regular rhythm.      Heart sounds: No murmur heard.  Pulmonary:      Effort: Pulmonary effort is normal. No respiratory distress.      Breath sounds: No wheezing or rales.   Neurological:      Mental Status: He is alert.            Results for orders placed or performed in visit on 01/28/25 (from the past 24 hours)   Urine Drug Screen Buprenorphine Urine Qualitative [id number], Ethanol Urine Qualitative [id number], Methadone Urine Qualitative [id number], Oxycodone Urine Qualitative [id number], Creatinine Urine Random [id number]    Narrative    The following orders were created for panel order Urine Drug Screen Buprenorphine Urine Qualitative [id number], Ethanol Urine Qualitative [id number], Methadone Urine Qualitative [id number], Oxycodone Urine Qualitative [id number], Creatinine " Urine Random QOF180.  Procedure                               Abnormality         Status                     ---------                               -----------         ------                     Urine Drug Screen Panel[[id number]]      Abnormal            Final result               Buprenorphine Urine, Rogelio...[[id number]]  Abnormal            Final result               Ethanol urine[[id number]]                Normal              Final result               Methadone Qual Urine[[id number]]         Normal              Final result               Oxycodone Urine, Qualita...[[id number]]  Normal              Final result               Creatinine random urine[[id number]]                          Final result                 Please view results for these tests on the individual orders.   Urine Drug Screen Panel   Result Value Ref Range    Amphetamines Urine Screen Negative Screen Negative    Barbituates Urine Screen Negative Screen Negative    Benzodiazepine Urine Screen Negative Screen Negative    Cannabinoids Urine Screen Positive (A) Screen Negative    Cocaine Urine Screen Negative Screen Negative    Fentanyl Qual Urine Screen Negative Screen Negative    Opiates Urine Screen Negative Screen Negative    PCP Urine Screen Negative Screen Negative   Buprenorphine Urine, Qualitative   Result Value Ref Range    Buprenorphine Qual Urine Screen Positive (A) Screen Negative   Ethanol urine   Result Value Ref Range    Ethanol Urine Screen Negative Screen Negative   Methadone Qual Urine   Result Value Ref Range    Methadone Urine Screen Negative Screen Negative   Oxycodone Urine, Qualitative   Result Value Ref Range    Oxycodone Urine Screen Negative Screen Negative   Creatinine random urine   Result Value Ref Range    Creatinine Urine mg/dL 148.3 mg/dL           Signed Electronically by: Althea Felix MD

## 2025-01-27 NOTE — PROGRESS NOTES
Clinic Care Coordination Contact  Care Team Conversations    RN CC sent Nnamdi a text message this date reminding him of his appointment tomorrow and to arrive at 3:45PM.    Jyothi Fowler RN-BSN, [facility] Care Coordinator  [phone]

## 2025-01-28 ENCOUNTER — OFFICE VISIT (OUTPATIENT)
Dept: FAMILY MEDICINE | Facility: OTHER | Age: 25
End: 2025-01-28
Attending: FAMILY MEDICINE

## 2025-01-28 ENCOUNTER — PATIENT OUTREACH (OUTPATIENT)
Dept: CARE COORDINATION | Facility: OTHER | Age: 25
End: 2025-01-28

## 2025-01-28 ENCOUNTER — APPOINTMENT (OUTPATIENT)
Dept: LAB | Facility: OTHER | Age: 25
End: 2025-01-28
Attending: FAMILY MEDICINE

## 2025-01-28 VITALS
TEMPERATURE: 98.7 F | BODY MASS INDEX: 20.59 KG/M2 | RESPIRATION RATE: 16 BRPM | WEIGHT: 160.4 LBS | SYSTOLIC BLOOD PRESSURE: 123 MMHG | DIASTOLIC BLOOD PRESSURE: 76 MMHG | HEART RATE: 73 BPM | OXYGEN SATURATION: 95 % | HEIGHT: 74 IN

## 2025-01-28 DIAGNOSIS — F11.90 OPIOID USE DISORDER: Primary | ICD-10-CM

## 2025-01-28 LAB
AMPHETAMINES UR QL SCN: ABNORMAL
BARBITURATES UR QL SCN: ABNORMAL
BENZODIAZ UR QL SCN: ABNORMAL
BUPRENORPHINE UR QL: ABNORMAL
BZE UR QL SCN: ABNORMAL
CANNABINOIDS UR QL SCN: ABNORMAL
CREAT UR-MCNC: 148.3 MG/DL
ETHANOL UR QL SCN: NORMAL
FENTANYL UR QL: ABNORMAL
METHADONE UR QL SCN: NORMAL
OPIATES UR QL SCN: ABNORMAL
OXYCODONE UR QL: NORMAL
PCP QUAL URINE (ROCHE): ABNORMAL

## 2025-01-28 RX ORDER — BUPRENORPHINE HYDROCHLORIDE AND NALOXONE HYDROCHLORIDE DIHYDRATE 8; 2 MG/1; MG/1
1 TABLET SUBLINGUAL 3 TIMES DAILY
Qty: 90 TABLET | Refills: 1 | Status: SHIPPED | OUTPATIENT
Start: 2025-01-28

## 2025-01-28 NOTE — PROGRESS NOTES
Clinic Care Coordination Contact  Follow Up Progress Note      Assessment: RN CC attended office visit with Nnamdi and Dr. Felix this date.  Nnamdi is doing relatively well since our last visit.  Suboxone dose is adequate and would like to continue on current dose.  No use or desire to use. THC use is ongoing.  Nnamdi continues to struggle with insurance issues.  He is working on this with the [facility] - RN CC did provide him with phone number to call [phone].  He does [facility], which he is covered 100% through 6/17/2025.  He continues to work full time - picking up extra hours as able.  Staying busy at home with his SO and their children.  Nnamdi has no concerns this date.  Declines Covid and Flu vaccines.        Care Gaps:    Health Maintenance Due   Topic Date Due    SHAD ASSESSMENT  Never done    CONTROLLED SUBSTANCE AGREEMENT FOR CHRONIC PAIN MANAGEMENT  Never done    YEARLY PREVENTIVE VISIT  04/07/2017    HEPATITIS A IMMUNIZATION (1 of 2 - Risk 2-dose series) Never done    Pneumococcal Vaccine: Pediatrics (0 to 5 Years) and At-Risk Patients (6 to 49 Years) (2 of 2 - PCV) 11/09/2021    COVID-19 Vaccine (1 - 2024-25 season) Never done       Will continue to work on these    Care Plans  Care Plan: Medication Regimen       Problem: Medication Adherence       Goal: Consistently take Medications as Prescribed       This Visit's Progress: 90% Recent Progress: 90%    Note:     Barriers: increases meds on own, lack of coping  Strengths: honesty  Patient expressed understanding of goal: Yes  Action steps to achieve this goal:  1. I will take my medications as prescribed  2. I will not increase my Suboxone dose, as I am now on the max dose  3. I will reach out to care team if I am struggling                              Care Plan: Nutrition       Problem: Diet management       Goal: Demonstrate improved diet management                             Intervention/Education provided during outreach:  Continue current meds as prescribed.  Provided him with phone number to call in regards to his insurance issue - instructed him on the importance of taking care of this issue.  Will continue to fill meds at [facility] due to his ambar care.  Offered Narcan - denied need today.      Would individual benefit from a referral for additional services/resources?    Yes - Health-related social needs - Food bag and Other - Please specify [facility] [employer] [facility] [employer] [facility]       Outreach Frequency: 2 months, more frequently as needed        Plan:   No change in treatment plan at this time.     Care Coordinator will follow up in 8 weeks, sooner if he has concerns.    Jyothi Fowler RN-BSN, [facility] Care Coordinator  [phone]

## 2025-03-24 ENCOUNTER — PATIENT OUTREACH (OUTPATIENT)
Dept: CARE COORDINATION | Facility: OTHER | Age: 25
End: 2025-03-24

## 2025-03-24 NOTE — PROGRESS NOTES
Clinic Care Coordination Contact  Care Team Conversations    RN CC sent Nnamdi a text message this date reminding him of his appointment tomorrow and to arrive at 3:45PM.  Informed him that he is due for annual blood work at that time as well.    Jyothi Fowler RN-BSN, [facility] Care Coordinator  [phone]

## 2025-03-24 NOTE — PROGRESS NOTES
Assessment & Plan     Opioid use disorder  [place] PDMP reviewed and appropriate. Stable   - CBC with platelets and differential  - Comprehensive metabolic panel (BMP + Alb, Alk Phos, ALT, AST, Total. Bili, TP)  - Urine Drug Screen Buprenorphine Urine Qualitative [id number], Ethanol Urine Qualitative EPE649, Methadone Urine Qualitative [id number], Oxycodone Urine Qualitative HDL8874, Creatinine Urine Random YNB192  - buprenorphine-naloxone (SUBOXONE) 8-2 MG SUBL sublingual tablet; Place 1 tablet under the tongue 3 times daily.    Bipolar I disorder (H)  Would like to get back on his depakote, but no insurance     Anemia   Does eat meat. Issues with dizziness. May not have the healthiest diet ([employer]). Weight stable  Diff wnl. Ferritin wnl, iron on the low side, suspect early start of iron deficiency.  Will do a trial of iron   - iron studies added on   - start iron every day , Rx sent  - recheck labs 3 months, lab order placed     Hypoglycemia   BG 65 today, suspecting food insecurity issues  LFT, Cr/gfr wnl, electrolytes wnl    The longitudinal plan of care for the diagnosis(es)/condition(s) as documented were addressed during this visit. Due to the added complexity in care, I will continue to support Nnamdi in the subsequent management and with ongoing continuity of care.      See Patient Instructions    No follow-ups on file.    Subjective   Nnamdi is a 24 year old, presenting for the following health issues:  Recheck Medication        3/25/2025     4:19 PM   Additional Questions   Roomed by Sharon Clark   Accompanied by self         3/25/2025     4:19 PM   Patient Reported Additional Medications   Patient reports taking the following new medications none     HPI          He is currently taking 8/2 mg of buprenorphine 3 times daily.   Last fill 3.3.25 #90.    Status Since Last Visit:  Have you used any opioids since your last visit?: no use since last visit  Do you feel that your dose of suboxone is too high or too  low? Adequate  Have there been cravings for opioids? No   Any withdrawal symptoms?  None     Any side effects from the medication?  None  Any alcohol use? None  Any other recreational drug use? THC    Precipitating Factors:  Triggers have been: non-existent   Other Supports:  Do you attend counseling or meet with a therapist? No  Do you attend NA or AA meetings? No  Do you have/meet with a sponsor? No  Family and support systems have been: Helpful  What other goals have you been working on (job, family, relationships, etc)? Attending scheduled appointments  Communicating effectively with health care team  Taking medication as prescribed  Continues to work full time  Going on 2 years on [facility] through our clinic with no slips or lapses        Answers submitted by the patient for this visit:  Patient Health Questionnaire (Submitted on 3/25/2025)  If you checked off any problems, how difficult have these problems made it for you to do your work, take care of things at home, or get along with other people?: Somewhat difficult  PHQ9 TOTAL SCORE: 6  Patient Health Questionnaire (G7) (Submitted on 3/25/2025)  SHAD 7 TOTAL SCORE: 9          10/16/2023    12:49 PM 4/4/2024     3:55 PM 3/25/2025     4:20 PM   PHQ-9 SCORE   PHQ-9 Total Score MyChart 6 (Mild depression)  6 (Mild depression)   PHQ-9 Total Score 6 0 6        Patient-reported           3/25/2025     4:22 PM   SHAD-7 SCORE   Total Score 9 (mild anxiety)   Total Score 9        Patient-reported     PDMP Review         Value Time User    State PDMP site checked  Yes 3/25/2025  4:26 PM Althea Felix MD            Wt Readings from Last 4 Encounters:   03/25/25 76.3 kg (168 lb 3.2 oz)   01/28/25 72.8 kg (160 lb 6.4 oz)   10/03/24 73.6 kg (162 lb 4.8 oz)   08/01/24 76.9 kg (169 lb 9.6 oz)         Review of Systems  Constitutional, HEENT, cardiovascular, pulmonary, gi and gu systems are negative, except as otherwise noted.      Objective    /68 (BP Location:  "Left arm, Patient Position: Sitting, Cuff Size: Adult Regular)   Pulse 80   Temp 97.7  F (36.5  C) (Tympanic)   Resp 17   Ht 1.88 m (6' 2\")   Wt 76.3 kg (168 lb 3.2 oz)   SpO2 98%   BMI 21.60 kg/m    Body mass index is 21.6 kg/m .  Physical Exam  Constitutional:       General: He is not in acute distress.     Appearance: He is not ill-appearing.   Cardiovascular:      Rate and Rhythm: Normal rate and regular rhythm.      Heart sounds: No murmur heard.  Pulmonary:      Effort: Pulmonary effort is normal. No respiratory distress.      Breath sounds: No wheezing or rales.   Neurological:      Mental Status: He is alert.   Psychiatric:         Mood and Affect: Mood normal.            Results for orders placed or performed in visit on 03/25/25 (from the past 24 hours)   CBC with platelets and differential    Narrative    The following orders were created for panel order CBC with platelets and differential.  Procedure                               Abnormality         Status                     ---------                               -----------         ------                     CBC with platelets and ...[[id number]]  Abnormal            Final result                 Please view results for these tests on the individual orders.   Comprehensive metabolic panel (BMP + Alb, Alk Phos, ALT, AST, Total. Bili, TP)   Result Value Ref Range    Sodium 139 135 - 145 mmol/L    Potassium 3.6 3.4 - 5.3 mmol/L    Carbon Dioxide (CO2) 28 22 - 29 mmol/L    Anion Gap 10 7 - 15 mmol/L    Urea Nitrogen 11.3 6.0 - 20.0 mg/dL    Creatinine 0.99 0.67 - 1.17 mg/dL    GFR Estimate >90 >60 mL/min/1.73m2    Calcium 9.4 8.8 - 10.4 mg/dL    Chloride 101 98 - 107 mmol/L    Glucose 65 (L) 70 - 99 mg/dL    Alkaline Phosphatase 70 40 - 150 U/L    AST 35 0 - 45 U/L    ALT 30 0 - 70 U/L    Protein Total 7.3 6.4 - 8.3 g/dL    Albumin 4.7 3.5 - 5.2 g/dL    Bilirubin Total 0.3 <=1.2 mg/dL   Urine Drug Screen Buprenorphine Urine Qualitative [id number], " Ethanol Urine Qualitative EBT844, Methadone Urine Qualitative ZNS7417, Oxycodone Urine Qualitative DCZ8369, Creatinine Urine Random JUB557    Narrative    The following orders were created for panel order Urine Drug Screen Buprenorphine Urine Qualitative [id number], Ethanol Urine Qualitative IUI080, Methadone Urine Qualitative NBY8192, Oxycodone Urine Qualitative CXZ3221, Creatinine Urine Random BKS142.  Procedure                               Abnormality         Status                     ---------                               -----------         ------                     Urine Drug Screen Panel[[id number]]     Abnormal            Final result               Buprenorphine Urine, Qu...[[id number]]  Abnormal            Final result               Ethanol urine[[id number]]               Normal              Final result               Methadone Qual Urine[[id number]]        Normal              Final result               Oxycodone Urine, Qualit...[[id number]]  Normal              Final result               Creatinine random urine[[id number]]                         Final result                 Please view results for these tests on the individual orders.   CBC with platelets and differential   Result Value Ref Range    WBC Count 9.0 4.0 - 11.0 10e3/uL    RBC Count 4.32 (L) 4.40 - 5.90 10e6/uL    Hemoglobin 12.6 (L) 13.3 - 17.7 g/dL    Hematocrit 37.1 (L) 40.0 - 53.0 %    MCV 86 78 - 100 fL    MCH 29.2 26.5 - 33.0 pg    MCHC 34.0 31.5 - 36.5 g/dL    RDW 12.9 10.0 - 15.0 %    Platelet Count 330 150 - 450 10e3/uL    % Neutrophils 66 %    % Lymphocytes 24 %    % Monocytes 6 %    % Eosinophils 2 %    % Basophils 1 %    % Immature Granulocytes 0 %    NRBCs per 100 WBC 0 <1 /100    Absolute Neutrophils 5.9 1.6 - 8.3 10e3/uL    Absolute Lymphocytes 2.2 0.8 - 5.3 10e3/uL    Absolute Monocytes 0.6 0.0 - 1.3 10e3/uL    Absolute Eosinophils 0.2 0.0 - 0.7 10e3/uL    Absolute Basophils 0.1 0.0 - 0.2 10e3/uL    Absolute Immature  Granulocytes 0.0 <=0.4 10e3/uL    Absolute NRBCs 0.0 10e3/uL   Urine Drug Screen Panel   Result Value Ref Range    Amphetamines Urine Screen Negative Screen Negative    Barbituates Urine Screen Negative Screen Negative    Benzodiazepine Urine Screen Negative Screen Negative    Cannabinoids Urine Screen Positive (A) Screen Negative    Cocaine Urine Screen Negative Screen Negative    Fentanyl Qual Urine Screen Negative Screen Negative    Opiates Urine Screen Negative Screen Negative    PCP Urine Screen Negative Screen Negative   Buprenorphine Urine, Qualitative   Result Value Ref Range    Buprenorphine Qual Urine Screen Positive (A) Screen Negative   Ethanol urine   Result Value Ref Range    Ethanol Urine Screen Negative Screen Negative   Methadone Qual Urine   Result Value Ref Range    Methadone Urine Screen Negative Screen Negative   Oxycodone Urine, Qualitative   Result Value Ref Range    Oxycodone Urine Screen Negative Screen Negative   Creatinine random urine   Result Value Ref Range    Creatinine Urine mg/dL 59.2 mg/dL   Ferritin   Result Value Ref Range    Ferritin 130 31 - 409 ng/mL   Iron and iron binding capacity   Result Value Ref Range    Iron 61 61 - 157 ug/dL    Iron Binding Capacity 257 240 - 430 ug/dL    Iron Sat Index 24 15 - 46 %           Signed Electronically by: Althea Felix MD

## 2025-03-25 ENCOUNTER — PATIENT OUTREACH (OUTPATIENT)
Dept: CARE COORDINATION | Facility: OTHER | Age: 25
End: 2025-03-25

## 2025-03-25 ENCOUNTER — OFFICE VISIT (OUTPATIENT)
Dept: FAMILY MEDICINE | Facility: OTHER | Age: 25
End: 2025-03-25
Attending: FAMILY MEDICINE

## 2025-03-25 ENCOUNTER — APPOINTMENT (OUTPATIENT)
Dept: LAB | Facility: OTHER | Age: 25
End: 2025-03-25
Attending: FAMILY MEDICINE

## 2025-03-25 VITALS
WEIGHT: 168.2 LBS | DIASTOLIC BLOOD PRESSURE: 68 MMHG | HEIGHT: 74 IN | OXYGEN SATURATION: 98 % | BODY MASS INDEX: 21.58 KG/M2 | SYSTOLIC BLOOD PRESSURE: 114 MMHG | RESPIRATION RATE: 17 BRPM | HEART RATE: 80 BPM | TEMPERATURE: 97.7 F

## 2025-03-25 DIAGNOSIS — E16.2 HYPOGLYCEMIA: ICD-10-CM

## 2025-03-25 DIAGNOSIS — D64.9 ANEMIA, UNSPECIFIED TYPE: ICD-10-CM

## 2025-03-25 DIAGNOSIS — F11.90 OPIOID USE DISORDER: Primary | ICD-10-CM

## 2025-03-25 DIAGNOSIS — F31.9 BIPOLAR I DISORDER (H): ICD-10-CM

## 2025-03-25 LAB
ALBUMIN SERPL BCG-MCNC: 4.7 G/DL (ref 3.5–5.2)
ALP SERPL-CCNC: 70 U/L (ref 40–150)
ALT SERPL W P-5'-P-CCNC: 30 U/L (ref 0–70)
AMPHETAMINES UR QL SCN: ABNORMAL
ANION GAP SERPL CALCULATED.3IONS-SCNC: 10 MMOL/L (ref 7–15)
AST SERPL W P-5'-P-CCNC: 35 U/L (ref 0–45)
BARBITURATES UR QL SCN: ABNORMAL
BASOPHILS # BLD AUTO: 0.1 10E3/UL (ref 0–0.2)
BASOPHILS NFR BLD AUTO: 1 %
BENZODIAZ UR QL SCN: ABNORMAL
BILIRUB SERPL-MCNC: 0.3 MG/DL
BUN SERPL-MCNC: 11.3 MG/DL (ref 6–20)
BUPRENORPHINE UR QL: ABNORMAL
BZE UR QL SCN: ABNORMAL
CALCIUM SERPL-MCNC: 9.4 MG/DL (ref 8.8–10.4)
CANNABINOIDS UR QL SCN: ABNORMAL
CHLORIDE SERPL-SCNC: 101 MMOL/L (ref 98–107)
CREAT SERPL-MCNC: 0.99 MG/DL (ref 0.67–1.17)
CREAT UR-MCNC: 59.2 MG/DL
EGFRCR SERPLBLD CKD-EPI 2021: >90 ML/MIN/1.73M2
EOSINOPHIL # BLD AUTO: 0.2 10E3/UL (ref 0–0.7)
EOSINOPHIL NFR BLD AUTO: 2 %
ERYTHROCYTE [DISTWIDTH] IN BLOOD BY AUTOMATED COUNT: 12.9 % (ref 10–15)
ETHANOL UR QL SCN: NORMAL
FENTANYL UR QL: ABNORMAL
FERRITIN SERPL-MCNC: 130 NG/ML (ref 31–409)
GLUCOSE SERPL-MCNC: 65 MG/DL (ref 70–99)
HCO3 SERPL-SCNC: 28 MMOL/L (ref 22–29)
HCT VFR BLD AUTO: 37.1 % (ref 40–53)
HGB BLD-MCNC: 12.6 G/DL (ref 13.3–17.7)
IMM GRANULOCYTES # BLD: 0 10E3/UL
IMM GRANULOCYTES NFR BLD: 0 %
IRON BINDING CAPACITY (ROCHE): 257 UG/DL (ref 240–430)
IRON SATN MFR SERPL: 24 % (ref 15–46)
IRON SERPL-MCNC: 61 UG/DL (ref 61–157)
LYMPHOCYTES # BLD AUTO: 2.2 10E3/UL (ref 0.8–5.3)
LYMPHOCYTES NFR BLD AUTO: 24 %
MCH RBC QN AUTO: 29.2 PG (ref 26.5–33)
MCHC RBC AUTO-ENTMCNC: 34 G/DL (ref 31.5–36.5)
MCV RBC AUTO: 86 FL (ref 78–100)
METHADONE UR QL SCN: NORMAL
MONOCYTES # BLD AUTO: 0.6 10E3/UL (ref 0–1.3)
MONOCYTES NFR BLD AUTO: 6 %
NEUTROPHILS # BLD AUTO: 5.9 10E3/UL (ref 1.6–8.3)
NEUTROPHILS NFR BLD AUTO: 66 %
NRBC # BLD AUTO: 0 10E3/UL
NRBC BLD AUTO-RTO: 0 /100
OPIATES UR QL SCN: ABNORMAL
OXYCODONE UR QL: NORMAL
PCP QUAL URINE (ROCHE): ABNORMAL
PLATELET # BLD AUTO: 330 10E3/UL (ref 150–450)
POTASSIUM SERPL-SCNC: 3.6 MMOL/L (ref 3.4–5.3)
PROT SERPL-MCNC: 7.3 G/DL (ref 6.4–8.3)
RBC # BLD AUTO: 4.32 10E6/UL (ref 4.4–5.9)
SODIUM SERPL-SCNC: 139 MMOL/L (ref 135–145)
WBC # BLD AUTO: 9 10E3/UL (ref 4–11)

## 2025-03-25 RX ORDER — FERROUS SULFATE 325(65) MG
325 TABLET ORAL
Qty: 90 TABLET | Refills: 0 | Status: SHIPPED | OUTPATIENT
Start: 2025-03-25

## 2025-03-25 RX ORDER — BUPRENORPHINE HYDROCHLORIDE AND NALOXONE HYDROCHLORIDE DIHYDRATE 8; 2 MG/1; MG/1
1 TABLET SUBLINGUAL 3 TIMES DAILY
Qty: 90 TABLET | Refills: 2 | Status: SHIPPED | OUTPATIENT
Start: 2025-03-25

## 2025-03-25 ASSESSMENT — ANXIETY QUESTIONNAIRES
8. IF YOU CHECKED OFF ANY PROBLEMS, HOW DIFFICULT HAVE THESE MADE IT FOR YOU TO DO YOUR WORK, TAKE CARE OF THINGS AT HOME, OR GET ALONG WITH OTHER PEOPLE?: SOMEWHAT DIFFICULT
2. NOT BEING ABLE TO STOP OR CONTROL WORRYING: MORE THAN HALF THE DAYS
7. FEELING AFRAID AS IF SOMETHING AWFUL MIGHT HAPPEN: MORE THAN HALF THE DAYS
IF YOU CHECKED OFF ANY PROBLEMS ON THIS QUESTIONNAIRE, HOW DIFFICULT HAVE THESE PROBLEMS MADE IT FOR YOU TO DO YOUR WORK, TAKE CARE OF THINGS AT HOME, OR GET ALONG WITH OTHER PEOPLE: SOMEWHAT DIFFICULT
7. FEELING AFRAID AS IF SOMETHING AWFUL MIGHT HAPPEN: MORE THAN HALF THE DAYS
GAD7 TOTAL SCORE: 9
4. TROUBLE RELAXING: SEVERAL DAYS
1. FEELING NERVOUS, ANXIOUS, OR ON EDGE: NOT AT ALL
6. BECOMING EASILY ANNOYED OR IRRITABLE: NOT AT ALL
5. BEING SO RESTLESS THAT IT IS HARD TO SIT STILL: MORE THAN HALF THE DAYS
3. WORRYING TOO MUCH ABOUT DIFFERENT THINGS: MORE THAN HALF THE DAYS
GAD7 TOTAL SCORE: 9
GAD7 TOTAL SCORE: 9

## 2025-03-25 ASSESSMENT — PATIENT HEALTH QUESTIONNAIRE - PHQ9
10. IF YOU CHECKED OFF ANY PROBLEMS, HOW DIFFICULT HAVE THESE PROBLEMS MADE IT FOR YOU TO DO YOUR WORK, TAKE CARE OF THINGS AT HOME, OR GET ALONG WITH OTHER PEOPLE: SOMEWHAT DIFFICULT
SUM OF ALL RESPONSES TO PHQ QUESTIONS 1-9: 6
SUM OF ALL RESPONSES TO PHQ QUESTIONS 1-9: 6

## 2025-03-25 ASSESSMENT — PAIN SCALES - GENERAL: PAINLEVEL_OUTOF10: NO PAIN (0)

## 2025-03-29 ENCOUNTER — HEALTH MAINTENANCE LETTER (OUTPATIENT)
Age: 25
End: 2025-03-29

## 2025-06-06 NOTE — ED NOTES
Pt requesting tylenol. Updated provider. See new orders.    Closure 3 Information: This tab is for additional flaps and grafts above and beyond our usual structured repairs.  Please note if you enter information here it will not currently bill and you will need to add the billing information manually.

## 2025-06-16 ENCOUNTER — PATIENT OUTREACH (OUTPATIENT)
Dept: CARE COORDINATION | Facility: OTHER | Age: 25
End: 2025-06-16

## 2025-06-16 NOTE — PROGRESS NOTES
Clinic Care Coordination Contact  Care Team Conversations    RN CC sent Nnamdi a text message this date reminding him of his appointment tomorrow and to arrive at 3:45PM.  Also informed him that he was approved for 100% [facility].    Jyothi Fowler RN-BSN, [facility] Care Coordinator  [phone]

## 2025-06-16 NOTE — PROGRESS NOTES
Assessment & Plan     Opioid use disorder  stable  - Urine Drug Screen Buprenorphine Urine Qualitative [id number], Ethanol Urine Qualitative QUK371, Methadone Urine Qualitative YRW4803, Oxycodone Urine Qualitative FSZ2664, Creatinine Urine Random LBX129  - buprenorphine-naloxone (SUBOXONE) 8-2 MG SUBL sublingual tablet; Place 1 tablet under the tongue 3 times daily.    Seasonal allergic rhinitis, unspecified trigger  Previously has done well with zyrtec   - cetirizine (ZYRTEC) 10 MG tablet; Take 1 tablet (10 mg) by mouth daily as needed for allergies.    Current every day nicotine vaping  - nicotine (NICODERM CQ) 21 MG/24HR 24 hr patch; Place 1 patch over 24 hours onto the skin every 24 hours.    Iron deficiency anemia secondary to inadequate dietary iron intake  Nnamdi will start his iron. Discuss it will help with his fatigue  Recheck labs at next visit     The longitudinal plan of care for the diagnosis(es)/condition(s) as documented were addressed during this visit. Due to the added complexity in care, I will continue to support Nnamdi in the subsequent management and with ongoing continuity of care.        Follow-up  Next visit 9/24/2025    Subjective   Nnamdi is a 24 year old, presenting for the following health issues:  Recheck Medication        6/17/2025     4:03 PM   Additional Questions   Roomed by Jesse lpn   Accompanied by self     HPI          He is currently taking 8/2 mg of buprenorphine 3 times daily.   Last fill 6.4.25 #90.    Status Since Last Visit:  Have you used any opioids since your last visit?: no use since last visit  Do you feel that your dose of suboxone is too high or too low? Adequate  Have there been cravings for opioids? No   Any withdrawal symptoms? None    Any side effects from the medication?   Any alcohol use? None  Any other recreational drug use? THC    Precipitating Factors:  Triggers have been: non-existent but has tooth pain   Other Supports:  Do you attend counseling or  meet with a therapist? No  Do you attend NA or AA meetings? No  Do you have/meet with a sponsor? No  Family and support systems have been: Helpful  What other goals have you been working on (job, family, relationships, etc)? Continues to work full time  Attending scheduled appointments  Taking med as prescribed  Communicating effectively with health care team  Getting  next month!  Was approved for [facility] this month    # Anemia:  Iron - not taking d/t GI upset     # allergies   Daily issues with runny nose     # right hand   - numb spot     # want to quit vaping  -    Answers submitted by the patient for this visit:  Patient Health Questionnaire (Submitted on 6/17/2025)  If you checked off any problems, how difficult have these problems made it for you to do your work, take care of things at home, or get along with other people?: Somewhat difficult  PHQ9 TOTAL SCORE: 5  Patient Health Questionnaire (G7) (Submitted on 6/17/2025)  SHAD 7 TOTAL SCORE: 11  Wt Readings from Last 4 Encounters:   06/17/25 75.8 kg (167 lb 3.2 oz)   03/25/25 76.3 kg (168 lb 3.2 oz)   01/28/25 72.8 kg (160 lb 6.4 oz)   10/03/24 73.6 kg (162 lb 4.8 oz)       PDMP Review         Value Time User    State PDMP site checked  Yes 3/25/2025  4:26 PM Althea Felix MD                  Review of Systems  Constitutional, HEENT, cardiovascular, pulmonary, gi and gu systems are negative, except as otherwise noted.      Objective    /68 (BP Location: Left arm, Patient Position: Sitting, Cuff Size: Adult Regular)   Pulse 77   Temp 98.4  F (36.9  C) (Tympanic)   Resp 16   Wt 75.8 kg (167 lb 3.2 oz)   SpO2 97%   BMI 21.47 kg/m    Body mass index is 21.47 kg/m .  Physical Exam  Constitutional:       General: He is not in acute distress.     Appearance: He is not ill-appearing.   Cardiovascular:      Rate and Rhythm: Normal rate and regular rhythm.      Heart sounds: No murmur heard.  Pulmonary:      Effort: Pulmonary  effort is normal. No respiratory distress.      Breath sounds: No wheezing or rales.   Neurological:      Mental Status: He is alert.   Psychiatric:         Mood and Affect: Mood is anxious.            Results for orders placed or performed in visit on 06/17/25 (from the past 24 hours)   Urine Drug Screen Buprenorphine Urine Qualitative [id number], Ethanol Urine Qualitative ZNS773, Methadone Urine Qualitative EOI7650, Oxycodone Urine Qualitative CHM7616, Creatinine Urine Random FJF800    Narrative    The following orders were created for panel order Urine Drug Screen Buprenorphine Urine Qualitative [id number], Ethanol Urine Qualitative WOJ376, Methadone Urine Qualitative ZLD2313, Oxycodone Urine Qualitative AXN7384, Creatinine Urine Random FEW799.  Procedure                               Abnormality         Status                     ---------                               -----------         ------                     Urine Drug Screen Panel[[id number]]     Abnormal            Final result               Buprenorphine Urine, Qu...[[id number]]  Abnormal            Final result               Ethanol urine[[id number]]               Normal              Final result               Methadone Qual Urine[[id number]]        Normal              Final result               Oxycodone Urine, Qualit...[[id number]]  Normal              Final result               Creatinine random urine[[id number]]                         Final result                 Please view results for these tests on the individual orders.   Urine Drug Screen Panel   Result Value Ref Range    Amphetamines Urine Screen Negative Screen Negative    Barbituates Urine Screen Negative Screen Negative    Benzodiazepine Urine Screen Negative Screen Negative    Cannabinoids Urine Screen Positive (A) Screen Negative    Cocaine Urine Screen Negative Screen Negative    Fentanyl Qual Urine Screen Negative Screen Negative    Opiates Urine Screen Negative Screen Negative    PCP  Urine Screen Negative Screen Negative   Buprenorphine Urine, Qualitative   Result Value Ref Range    Buprenorphine Qual Urine Screen Positive (A) Screen Negative   Ethanol urine   Result Value Ref Range    Ethanol Urine Screen Negative Screen Negative   Methadone Qual Urine   Result Value Ref Range    Methadone Urine Screen Negative Screen Negative   Oxycodone Urine, Qualitative   Result Value Ref Range    Oxycodone Urine Screen Negative Screen Negative   Creatinine random urine   Result Value Ref Range    Creatinine Urine mg/dL 109.1 mg/dL           Signed Electronically by: Althea Felix MD

## 2025-06-17 ENCOUNTER — RESULTS FOLLOW-UP (OUTPATIENT)
Dept: FAMILY MEDICINE | Facility: OTHER | Age: 25
End: 2025-06-17

## 2025-06-17 ENCOUNTER — APPOINTMENT (OUTPATIENT)
Dept: LAB | Facility: OTHER | Age: 25
End: 2025-06-17
Attending: FAMILY MEDICINE

## 2025-06-17 ENCOUNTER — PATIENT OUTREACH (OUTPATIENT)
Dept: CARE COORDINATION | Facility: OTHER | Age: 25
End: 2025-06-17

## 2025-06-17 ENCOUNTER — OFFICE VISIT (OUTPATIENT)
Dept: FAMILY MEDICINE | Facility: OTHER | Age: 25
End: 2025-06-17
Attending: FAMILY MEDICINE

## 2025-06-17 VITALS
RESPIRATION RATE: 16 BRPM | WEIGHT: 167.2 LBS | BODY MASS INDEX: 21.47 KG/M2 | DIASTOLIC BLOOD PRESSURE: 68 MMHG | HEART RATE: 77 BPM | TEMPERATURE: 98.4 F | SYSTOLIC BLOOD PRESSURE: 120 MMHG | OXYGEN SATURATION: 97 %

## 2025-06-17 DIAGNOSIS — Z72.0 CURRENT EVERY DAY NICOTINE VAPING: ICD-10-CM

## 2025-06-17 DIAGNOSIS — F11.90 OPIOID USE DISORDER: Primary | ICD-10-CM

## 2025-06-17 DIAGNOSIS — D50.8 IRON DEFICIENCY ANEMIA SECONDARY TO INADEQUATE DIETARY IRON INTAKE: ICD-10-CM

## 2025-06-17 DIAGNOSIS — J30.2 SEASONAL ALLERGIC RHINITIS, UNSPECIFIED TRIGGER: ICD-10-CM

## 2025-06-17 LAB
AMPHETAMINES UR QL SCN: ABNORMAL
BARBITURATES UR QL SCN: ABNORMAL
BENZODIAZ UR QL SCN: ABNORMAL
BUPRENORPHINE UR QL: ABNORMAL
BZE UR QL SCN: ABNORMAL
CANNABINOIDS UR QL SCN: ABNORMAL
CREAT UR-MCNC: 109.1 MG/DL
ETHANOL UR QL SCN: NORMAL
FENTANYL UR QL: ABNORMAL
METHADONE UR QL SCN: NORMAL
OPIATES UR QL SCN: ABNORMAL
OXYCODONE UR QL: NORMAL
PCP QUAL URINE (ROCHE): ABNORMAL

## 2025-06-17 RX ORDER — NICOTINE 21 MG/24HR
1 PATCH, TRANSDERMAL 24 HOURS TRANSDERMAL EVERY 24 HOURS
Qty: 28 PATCH | Refills: 2 | Status: SHIPPED | OUTPATIENT
Start: 2025-06-17

## 2025-06-17 RX ORDER — CETIRIZINE HYDROCHLORIDE 10 MG/1
10 TABLET ORAL DAILY PRN
Qty: 90 TABLET | Refills: 1 | Status: SHIPPED | OUTPATIENT
Start: 2025-06-17

## 2025-06-17 RX ORDER — BUPRENORPHINE HYDROCHLORIDE AND NALOXONE HYDROCHLORIDE DIHYDRATE 8; 2 MG/1; MG/1
1 TABLET SUBLINGUAL 3 TIMES DAILY
Qty: 90 TABLET | Refills: 2 | Status: SHIPPED | OUTPATIENT
Start: 2025-07-02

## 2025-06-17 ASSESSMENT — ANXIETY QUESTIONNAIRES
IF YOU CHECKED OFF ANY PROBLEMS ON THIS QUESTIONNAIRE, HOW DIFFICULT HAVE THESE PROBLEMS MADE IT FOR YOU TO DO YOUR WORK, TAKE CARE OF THINGS AT HOME, OR GET ALONG WITH OTHER PEOPLE: NOT DIFFICULT AT ALL
GAD7 TOTAL SCORE: 11
GAD7 TOTAL SCORE: 11
2. NOT BEING ABLE TO STOP OR CONTROL WORRYING: NEARLY EVERY DAY
1. FEELING NERVOUS, ANXIOUS, OR ON EDGE: MORE THAN HALF THE DAYS
6. BECOMING EASILY ANNOYED OR IRRITABLE: NOT AT ALL
GAD7 TOTAL SCORE: 11
3. WORRYING TOO MUCH ABOUT DIFFERENT THINGS: NEARLY EVERY DAY
7. FEELING AFRAID AS IF SOMETHING AWFUL MIGHT HAPPEN: NOT AT ALL
4. TROUBLE RELAXING: SEVERAL DAYS
7. FEELING AFRAID AS IF SOMETHING AWFUL MIGHT HAPPEN: NOT AT ALL
8. IF YOU CHECKED OFF ANY PROBLEMS, HOW DIFFICULT HAVE THESE MADE IT FOR YOU TO DO YOUR WORK, TAKE CARE OF THINGS AT HOME, OR GET ALONG WITH OTHER PEOPLE?: NOT DIFFICULT AT ALL
5. BEING SO RESTLESS THAT IT IS HARD TO SIT STILL: MORE THAN HALF THE DAYS

## 2025-06-17 ASSESSMENT — PATIENT HEALTH QUESTIONNAIRE - PHQ9
SUM OF ALL RESPONSES TO PHQ QUESTIONS 1-9: 5
SUM OF ALL RESPONSES TO PHQ QUESTIONS 1-9: 5
10. IF YOU CHECKED OFF ANY PROBLEMS, HOW DIFFICULT HAVE THESE PROBLEMS MADE IT FOR YOU TO DO YOUR WORK, TAKE CARE OF THINGS AT HOME, OR GET ALONG WITH OTHER PEOPLE: SOMEWHAT DIFFICULT

## 2025-06-17 ASSESSMENT — PAIN SCALES - GENERAL: PAINLEVEL_OUTOF10: MILD PAIN (3)

## 2025-06-17 NOTE — PROGRESS NOTES
Clinic Care Coordination Contact  Follow Up Progress Note      Assessment: RN CC unable to attend office visit with Nnamdi and Dr. Felix this date.  Nnamdi aware to reach out to RN CC with any questions/concerns.  Did schedule him a 12 week follow up appointment.       Care Gaps:    Health Maintenance Due   Topic Date Due    CONTROLLED SUBSTANCE AGREEMENT FOR CHRONIC PAIN MANAGEMENT  Never done    YEARLY PREVENTIVE VISIT  04/07/2017    HEPATITIS A VACCINE (1 of 2 - Risk 2-dose series) Never done    PNEUMOCOCCAL VACCINE: PEDIATRICS (0 to 5 YEARS) AND AT-RISK PATIENTS (6 to 49 YEARS) (2 of 2 - PCV) 11/09/2021    COVID-19 VACCINE (1 - 2024-25 season) Never done       Will continue to work on these    Care Plans  Care Plan: Medication Regimen       Problem: Medication Adherence       Goal: Consistently take Medications as Prescribed       This Visit's Progress: 90% Recent Progress: 90%    Note:     Barriers: increases meds on own, lack of coping  Strengths: honesty  Patient expressed understanding of goal: Yes  Action steps to achieve this goal:  1. I will take my medications as prescribed  2. I will not increase my Suboxone dose, as I am now on the max dose  3. I will reach out to care team if I am struggling                                Intervention/Education provided during outreach: Stage of Change: Maintenance  Reviewed information and resources for treatment and ongoing sobriety    Facilitated understanding the importance of awareness of factors that contribute to relapse , Assisted patient in identifying personal vulnerabilities, thoughts, emotions, and situations that may lead to relapse , Coached on skills to manage factors that contribute to relapse, and Facilitated understanding of effective coping skills in response to triggers for substance use      Continue current meds as prescribed.       Outreach Frequency: 3 months, more frequently as needed        Plan:   No change in [facility] treatment plan at this  time.    Care Coordinator will follow up in 12 weeks, sooner if he has concerns.    Jyothi Fowler RN-BSN, [facility] Care Coordinator  [phone]

## (undated) DEVICE — SOL WATER 1500ML

## (undated) DEVICE — SLEEVE COMPRESSION SCD KNEE MED 74022

## (undated) DEVICE — Device

## (undated) DEVICE — PAD CHUX UNDERPAD 30X36" P3036C

## (undated) DEVICE — GUIDEWIRE AMPLATZ SUPER STIFF .038"X145CM M0066401061

## (undated) DEVICE — CATH URETERAL 5FRX70CM OPEN END FLEX TIP G14521

## (undated) DEVICE — TUOGHY BORST ADAPTER WITH SIDE ARM

## (undated) DEVICE — GUIDEWIRE SENSOR DUAL FLEX STR 0.035"X150CM M0066703080

## (undated) DEVICE — GLOVE PROTEXIS POWDER FREE SMT 8.5 2D72PT85X

## (undated) DEVICE — BASKET NITINOL TIPLESS HALO  1.5FRX120CM 554120

## (undated) DEVICE — PACK CYSTO SBA15CSFCA

## (undated) DEVICE — CATH INTERMITTENT CLEAN-CATH 16FR 16" VINYL LF 421716

## (undated) RX ORDER — ONDANSETRON 2 MG/ML
INJECTION INTRAMUSCULAR; INTRAVENOUS
Status: DISPENSED
Start: 2023-01-18

## (undated) RX ORDER — PROPOFOL 10 MG/ML
INJECTION, EMULSION INTRAVENOUS
Status: DISPENSED
Start: 2023-01-18

## (undated) RX ORDER — DEXAMETHASONE SODIUM PHOSPHATE 4 MG/ML
INJECTION, SOLUTION INTRA-ARTICULAR; INTRALESIONAL; INTRAMUSCULAR; INTRAVENOUS; SOFT TISSUE
Status: DISPENSED
Start: 2023-01-18

## (undated) RX ORDER — IOPAMIDOL 755 MG/ML
INJECTION, SOLUTION INTRAVASCULAR
Status: DISPENSED
Start: 2023-01-18

## (undated) RX ORDER — FENTANYL CITRATE 50 UG/ML
INJECTION, SOLUTION INTRAMUSCULAR; INTRAVENOUS
Status: DISPENSED
Start: 2023-01-18

## (undated) RX ORDER — KETOROLAC TROMETHAMINE 30 MG/ML
INJECTION, SOLUTION INTRAMUSCULAR; INTRAVENOUS
Status: DISPENSED
Start: 2023-01-18